# Patient Record
Sex: MALE | Race: WHITE | NOT HISPANIC OR LATINO | Employment: OTHER | URBAN - METROPOLITAN AREA
[De-identification: names, ages, dates, MRNs, and addresses within clinical notes are randomized per-mention and may not be internally consistent; named-entity substitution may affect disease eponyms.]

---

## 2017-01-01 ENCOUNTER — APPOINTMENT (OUTPATIENT)
Dept: GENERAL RADIOLOGY | Facility: HOSPITAL | Age: 82
End: 2017-01-01

## 2017-01-01 ENCOUNTER — APPOINTMENT (OUTPATIENT)
Dept: CT IMAGING | Facility: HOSPITAL | Age: 82
End: 2017-01-01

## 2017-01-01 ENCOUNTER — APPOINTMENT (OUTPATIENT)
Dept: ULTRASOUND IMAGING | Facility: HOSPITAL | Age: 82
End: 2017-01-01

## 2017-01-01 ENCOUNTER — APPOINTMENT (OUTPATIENT)
Dept: CARDIOLOGY | Facility: HOSPITAL | Age: 82
End: 2017-01-01
Attending: FAMILY MEDICINE

## 2017-01-01 ENCOUNTER — APPOINTMENT (OUTPATIENT)
Dept: CARDIOLOGY | Facility: HOSPITAL | Age: 82
End: 2017-01-01
Attending: INTERNAL MEDICINE

## 2017-01-01 ENCOUNTER — HOSPITAL ENCOUNTER (INPATIENT)
Facility: HOSPITAL | Age: 82
LOS: 28 days | End: 2017-10-06
Attending: FAMILY MEDICINE | Admitting: INTERNAL MEDICINE

## 2017-01-01 ENCOUNTER — HOSPITAL ENCOUNTER (INPATIENT)
Facility: HOSPITAL | Age: 82
LOS: 34 days | End: 2017-09-08
Attending: EMERGENCY MEDICINE | Admitting: INTERNAL MEDICINE

## 2017-01-01 VITALS
WEIGHT: 224 LBS | HEART RATE: 70 BPM | OXYGEN SATURATION: 92 % | BODY MASS INDEX: 30.34 KG/M2 | SYSTOLIC BLOOD PRESSURE: 90 MMHG | DIASTOLIC BLOOD PRESSURE: 55 MMHG | HEIGHT: 72 IN | RESPIRATION RATE: 22 BRPM | TEMPERATURE: 97.1 F

## 2017-01-01 VITALS
HEART RATE: 60 BPM | SYSTOLIC BLOOD PRESSURE: 130 MMHG | OXYGEN SATURATION: 96 % | DIASTOLIC BLOOD PRESSURE: 81 MMHG | BODY MASS INDEX: 30.46 KG/M2 | WEIGHT: 224.87 LBS | HEIGHT: 72 IN | TEMPERATURE: 97.7 F | RESPIRATION RATE: 18 BRPM

## 2017-01-01 DIAGNOSIS — Z74.09 IMPAIRED FUNCTIONAL MOBILITY, BALANCE, GAIT, AND ENDURANCE: Primary | ICD-10-CM

## 2017-01-01 DIAGNOSIS — I10 UNCONTROLLED HYPERTENSION: ICD-10-CM

## 2017-01-01 DIAGNOSIS — I44.2 COMPLETE HEART BLOCK (HCC): ICD-10-CM

## 2017-01-01 DIAGNOSIS — Z74.09 IMPAIRED MOBILITY AND ADLS: ICD-10-CM

## 2017-01-01 DIAGNOSIS — Z78.9 IMPAIRED MOBILITY AND ADLS: ICD-10-CM

## 2017-01-01 DIAGNOSIS — R13.13 PHARYNGEAL DYSPHAGIA: Primary | ICD-10-CM

## 2017-01-01 DIAGNOSIS — R13.10 DYSPHAGIA, UNSPECIFIED: ICD-10-CM

## 2017-01-01 DIAGNOSIS — Z74.09 IMPAIRED FUNCTIONAL MOBILITY, BALANCE, GAIT, AND ENDURANCE: ICD-10-CM

## 2017-01-01 LAB
ABO + RH BLD: NORMAL
ABO + RH BLD: NORMAL
ABO GROUP BLD: NORMAL
ABO GROUP BLD: NORMAL
ALBUMIN SERPL-MCNC: 2.6 G/DL (ref 2.9–4.4)
ALBUMIN SERPL-MCNC: 2.8 G/DL (ref 3.2–4.8)
ALBUMIN SERPL-MCNC: 2.9 G/DL (ref 3.2–4.8)
ALBUMIN SERPL-MCNC: 3 G/DL (ref 3.2–4.8)
ALBUMIN SERPL-MCNC: 3 G/DL (ref 3.2–4.8)
ALBUMIN SERPL-MCNC: 3.1 G/DL (ref 3.2–4.8)
ALBUMIN SERPL-MCNC: 3.2 G/DL (ref 3.2–4.8)
ALBUMIN SERPL-MCNC: 3.2 G/DL (ref 3.2–4.8)
ALBUMIN SERPL-MCNC: 3.3 G/DL (ref 3.2–4.8)
ALBUMIN SERPL-MCNC: 3.3 G/DL (ref 3.2–4.8)
ALBUMIN SERPL-MCNC: 3.4 G/DL (ref 3.2–4.8)
ALBUMIN SERPL-MCNC: 3.4 G/DL (ref 3.2–4.8)
ALBUMIN SERPL-MCNC: 3.5 G/DL (ref 3.2–4.8)
ALBUMIN SERPL-MCNC: 3.6 G/DL (ref 3.2–4.8)
ALBUMIN SERPL-MCNC: 3.8 G/DL (ref 3.2–4.8)
ALBUMIN SERPL-MCNC: 3.9 G/DL (ref 3.2–4.8)
ALBUMIN/GLOB SERPL: 0.9 G/DL (ref 1.5–2.5)
ALBUMIN/GLOB SERPL: 1 G/DL (ref 1.5–2.5)
ALBUMIN/GLOB SERPL: 1.1 G/DL (ref 1.5–2.5)
ALBUMIN/GLOB SERPL: 1.1 {RATIO} (ref 0.7–1.7)
ALBUMIN/GLOB SERPL: 1.5 G/DL (ref 1.5–2.5)
ALBUMIN/GLOB SERPL: 1.6 G/DL (ref 1.5–2.5)
ALBUMIN/GLOB SERPL: 1.6 G/DL (ref 1.5–2.5)
ALBUMIN/GLOB SERPL: 1.8 G/DL (ref 1.5–2.5)
ALP SERPL-CCNC: 106 U/L (ref 25–100)
ALP SERPL-CCNC: 108 U/L (ref 25–100)
ALP SERPL-CCNC: 52 U/L (ref 25–100)
ALP SERPL-CCNC: 54 U/L (ref 25–100)
ALP SERPL-CCNC: 55 U/L (ref 25–100)
ALP SERPL-CCNC: 59 U/L (ref 25–100)
ALP SERPL-CCNC: 62 U/L (ref 25–100)
ALP SERPL-CCNC: 62 U/L (ref 25–100)
ALP SERPL-CCNC: 67 U/L (ref 25–100)
ALP SERPL-CCNC: 70 U/L (ref 25–100)
ALP SERPL-CCNC: 77 U/L (ref 25–100)
ALP SERPL-CCNC: 77 U/L (ref 25–100)
ALP SERPL-CCNC: 80 U/L (ref 25–100)
ALP SERPL-CCNC: 87 U/L (ref 25–100)
ALP SERPL-CCNC: 91 U/L (ref 25–100)
ALPHA1 GLOB FLD ELPH-MCNC: 0.3 G/DL (ref 0–0.4)
ALPHA2 GLOB SERPL ELPH-MCNC: 0.6 G/DL (ref 0.4–1)
ALT SERPL W P-5'-P-CCNC: 118 U/L (ref 7–40)
ALT SERPL W P-5'-P-CCNC: 122 U/L (ref 7–40)
ALT SERPL W P-5'-P-CCNC: 15 U/L (ref 7–40)
ALT SERPL W P-5'-P-CCNC: 152 U/L (ref 7–40)
ALT SERPL W P-5'-P-CCNC: 18 U/L (ref 7–40)
ALT SERPL W P-5'-P-CCNC: 19 U/L (ref 7–40)
ALT SERPL W P-5'-P-CCNC: 193 U/L (ref 7–40)
ALT SERPL W P-5'-P-CCNC: 20 U/L (ref 7–40)
ALT SERPL W P-5'-P-CCNC: 23 U/L (ref 7–40)
ALT SERPL W P-5'-P-CCNC: 24 U/L (ref 7–40)
ALT SERPL W P-5'-P-CCNC: 25 U/L (ref 7–40)
ALT SERPL W P-5'-P-CCNC: 30 U/L (ref 7–40)
ALT SERPL W P-5'-P-CCNC: 33 U/L (ref 7–40)
ALT SERPL W P-5'-P-CCNC: 34 U/L (ref 7–40)
ALT SERPL W P-5'-P-CCNC: 51 U/L (ref 7–40)
AMMONIA BLD-SCNC: 25 UMOL/L (ref 19–60)
AMMONIA BLD-SCNC: 29 UMOL/L (ref 19–60)
AMPHET+METHAMPHET UR QL: NEGATIVE
AMPHET+METHAMPHET UR QL: NEGATIVE
AMPHETAMINES UR QL: NEGATIVE
AMPHETAMINES UR QL: NEGATIVE
ANA SER QL IA: NEGATIVE
ANION GAP SERPL CALCULATED.3IONS-SCNC: 1 MMOL/L (ref 3–11)
ANION GAP SERPL CALCULATED.3IONS-SCNC: 10 MMOL/L (ref 3–11)
ANION GAP SERPL CALCULATED.3IONS-SCNC: 11 MMOL/L (ref 3–11)
ANION GAP SERPL CALCULATED.3IONS-SCNC: 12 MMOL/L (ref 3–11)
ANION GAP SERPL CALCULATED.3IONS-SCNC: 12 MMOL/L (ref 3–11)
ANION GAP SERPL CALCULATED.3IONS-SCNC: 13 MMOL/L (ref 3–11)
ANION GAP SERPL CALCULATED.3IONS-SCNC: 14 MMOL/L (ref 3–11)
ANION GAP SERPL CALCULATED.3IONS-SCNC: 4 MMOL/L (ref 3–11)
ANION GAP SERPL CALCULATED.3IONS-SCNC: 5 MMOL/L (ref 3–11)
ANION GAP SERPL CALCULATED.3IONS-SCNC: 6 MMOL/L (ref 3–11)
ANION GAP SERPL CALCULATED.3IONS-SCNC: 7 MMOL/L (ref 3–11)
ANION GAP SERPL CALCULATED.3IONS-SCNC: 7 MMOL/L (ref 3–11)
ANION GAP SERPL CALCULATED.3IONS-SCNC: 8 MMOL/L (ref 3–11)
ANION GAP SERPL CALCULATED.3IONS-SCNC: 9 MMOL/L (ref 3–11)
APTT PPP: 28.8 SECONDS (ref 24–31)
ARTERIAL PATENCY WRIST A: ABNORMAL
ARTERIAL PATENCY WRIST A: POSITIVE
AST SERPL-CCNC: 109 U/L (ref 0–33)
AST SERPL-CCNC: 149 U/L (ref 0–33)
AST SERPL-CCNC: 23 U/L (ref 0–33)
AST SERPL-CCNC: 23 U/L (ref 0–33)
AST SERPL-CCNC: 28 U/L (ref 0–33)
AST SERPL-CCNC: 32 U/L (ref 0–33)
AST SERPL-CCNC: 32 U/L (ref 0–33)
AST SERPL-CCNC: 33 U/L (ref 0–33)
AST SERPL-CCNC: 37 U/L (ref 0–33)
AST SERPL-CCNC: 38 U/L (ref 0–33)
AST SERPL-CCNC: 41 U/L (ref 0–33)
AST SERPL-CCNC: 42 U/L (ref 0–33)
AST SERPL-CCNC: 48 U/L (ref 0–33)
AST SERPL-CCNC: 61 U/L (ref 0–33)
AST SERPL-CCNC: 81 U/L (ref 0–33)
ATMOSPHERIC PRESS: ABNORMAL MMHG
B-GLOBULIN SERPL ELPH-MCNC: 0.7 G/DL (ref 0.7–1.3)
BACTERIA SPEC AEROBE CULT: NORMAL
BACTERIA UR QL AUTO: ABNORMAL /HPF
BARBITURATES UR QL SCN: NEGATIVE
BARBITURATES UR QL SCN: NEGATIVE
BASE EXCESS BLDA CALC-SCNC: -1.9 MMOL/L (ref 0–2)
BASE EXCESS BLDA CALC-SCNC: -6.5 MMOL/L (ref 0–2)
BASE EXCESS BLDA CALC-SCNC: -7.8 MMOL/L (ref 0–2)
BASE EXCESS BLDA CALC-SCNC: 3 MMOL/L (ref 0–2)
BASE EXCESS BLDA CALC-SCNC: 3.5 MMOL/L (ref 0–2)
BASOPHILS # BLD AUTO: 0.01 10*3/MM3 (ref 0–0.2)
BASOPHILS # BLD AUTO: 0.02 10*3/MM3 (ref 0–0.2)
BASOPHILS # BLD AUTO: 0.03 10*3/MM3 (ref 0–0.2)
BASOPHILS # BLD AUTO: 0.05 10*3/MM3 (ref 0–0.2)
BASOPHILS NFR BLD AUTO: 0.1 % (ref 0–1)
BASOPHILS NFR BLD AUTO: 0.2 % (ref 0–1)
BASOPHILS NFR BLD AUTO: 0.2 % (ref 0–1)
BASOPHILS NFR BLD AUTO: 0.3 % (ref 0–1)
BASOPHILS NFR BLD AUTO: 0.4 % (ref 0–1)
BASOPHILS NFR BLD AUTO: 0.5 % (ref 0–1)
BASOPHILS NFR BLD AUTO: 0.5 % (ref 0–1)
BASOPHILS NFR BLD AUTO: 0.7 % (ref 0–1)
BDY SITE: ABNORMAL
BENZODIAZ UR QL SCN: NEGATIVE
BENZODIAZ UR QL SCN: POSITIVE
BH BB BLOOD EXPIRATION DATE: NORMAL
BH BB BLOOD EXPIRATION DATE: NORMAL
BH BB BLOOD TYPE BARCODE: 5100
BH BB BLOOD TYPE BARCODE: 5100
BH BB DISPENSE STATUS: NORMAL
BH BB DISPENSE STATUS: NORMAL
BH BB PRODUCT CODE: NORMAL
BH BB PRODUCT CODE: NORMAL
BH BB UNIT NUMBER: NORMAL
BH BB UNIT NUMBER: NORMAL
BH CV ECHO MEAS - AO ROOT AREA (BSA CORRECTED): 1.4
BH CV ECHO MEAS - AO ROOT AREA: 7 CM^2
BH CV ECHO MEAS - AO ROOT DIAM: 3 CM
BH CV ECHO MEAS - BSA(HAYCOCK): 2.2 M^2
BH CV ECHO MEAS - BSA(HAYCOCK): 2.3 M^2
BH CV ECHO MEAS - BSA: 2.2 M^2
BH CV ECHO MEAS - BSA: 2.2 M^2
BH CV ECHO MEAS - BZI_BMI: 29.2 KILOGRAMS/M^2
BH CV ECHO MEAS - BZI_BMI: 30.5 KILOGRAMS/M^2
BH CV ECHO MEAS - BZI_METRIC_HEIGHT: 182.9 CM
BH CV ECHO MEAS - BZI_METRIC_HEIGHT: 182.9 CM
BH CV ECHO MEAS - BZI_METRIC_WEIGHT: 102.1 KG
BH CV ECHO MEAS - BZI_METRIC_WEIGHT: 97.5 KG
BH CV ECHO MEAS - EDV(CUBED): 174.8 ML
BH CV ECHO MEAS - EDV(TEICH): 153.1 ML
BH CV ECHO MEAS - EF(CUBED): 52.4 %
BH CV ECHO MEAS - EF(TEICH): 43.8 %
BH CV ECHO MEAS - ESV(CUBED): 83.1 ML
BH CV ECHO MEAS - ESV(TEICH): 86 ML
BH CV ECHO MEAS - FS: 21.9 %
BH CV ECHO MEAS - IVS/LVPW: 1
BH CV ECHO MEAS - IVSD: 1.3 CM
BH CV ECHO MEAS - LA DIMENSION: 2.7 CM
BH CV ECHO MEAS - LA/AO: 0.88
BH CV ECHO MEAS - LV MASS(C)D: 312 GRAMS
BH CV ECHO MEAS - LV MASS(C)DI: 142 GRAMS/M^2
BH CV ECHO MEAS - LVIDD: 5.6 CM
BH CV ECHO MEAS - LVIDS: 4.4 CM
BH CV ECHO MEAS - LVPWD: 1.3 CM
BH CV ECHO MEAS - MV A MAX VEL: 124.4 CM/SEC
BH CV ECHO MEAS - MV DEC SLOPE: 194.1 CM/SEC^2
BH CV ECHO MEAS - MV DEC TIME: 0.25 SEC
BH CV ECHO MEAS - MV E MAX VEL: 64.2 CM/SEC
BH CV ECHO MEAS - MV E/A: 0.52
BH CV ECHO MEAS - MV P1/2T MAX VEL: 89 CM/SEC
BH CV ECHO MEAS - MV P1/2T: 134.4 MSEC
BH CV ECHO MEAS - MVA P1/2T LCG: 2.5 CM^2
BH CV ECHO MEAS - MVA(P1/2T): 1.6 CM^2
BH CV ECHO MEAS - PA ACC SLOPE: 567 CM/SEC^2
BH CV ECHO MEAS - PA ACC TIME: 0.17 SEC
BH CV ECHO MEAS - PA PR(ACCEL): 3 MMHG
BH CV ECHO MEAS - RV MAX PG: 4.1 MMHG
BH CV ECHO MEAS - RV V1 MAX: 100.7 CM/SEC
BH CV ECHO MEAS - SI(CUBED): 41.7 ML/M^2
BH CV ECHO MEAS - SI(TEICH): 30.5 ML/M^2
BH CV ECHO MEAS - SV(CUBED): 91.7 ML
BH CV ECHO MEAS - SV(TEICH): 67.1 ML
BH CV ECHO MEAS - TAPSE (>1.6): 3.2 CM2
BH CV UPPER VENOUS RIGHT AXILLARY AUGMENT: NORMAL
BH CV UPPER VENOUS RIGHT AXILLARY COMPRESS: NORMAL
BH CV UPPER VENOUS RIGHT AXILLARY PHASIC: NORMAL
BH CV UPPER VENOUS RIGHT AXILLARY SPONT: NORMAL
BH CV UPPER VENOUS RIGHT BASILIC UPPER COMPRESS: NORMAL
BH CV UPPER VENOUS RIGHT BRACHIAL COMPRESS: NORMAL
BH CV UPPER VENOUS RIGHT CEPHALIC FOREARM COLOR: 1
BH CV UPPER VENOUS RIGHT CEPHALIC FOREARM COMPRESS: NORMAL
BH CV UPPER VENOUS RIGHT CEPHALIC FOREARM THROMBUS: NORMAL
BH CV UPPER VENOUS RIGHT CEPHALIC UPPER COLOR: 1
BH CV UPPER VENOUS RIGHT CEPHALIC UPPER COMPRESS: NORMAL
BH CV UPPER VENOUS RIGHT CEPHALIC UPPER THROMBUS: NORMAL
BH CV UPPER VENOUS RIGHT INTERNAL JUGULAR AUGMENT: NORMAL
BH CV UPPER VENOUS RIGHT INTERNAL JUGULAR COMPRESS: NORMAL
BH CV UPPER VENOUS RIGHT INTERNAL JUGULAR PHASIC: NORMAL
BH CV UPPER VENOUS RIGHT INTERNAL JUGULAR SPONT: NORMAL
BH CV UPPER VENOUS RIGHT RADIAL COMPRESS: NORMAL
BH CV UPPER VENOUS RIGHT SUBCLAVIAN AUGMENT: NORMAL
BH CV UPPER VENOUS RIGHT SUBCLAVIAN COMPRESS: NORMAL
BH CV UPPER VENOUS RIGHT SUBCLAVIAN PHASIC: NORMAL
BH CV UPPER VENOUS RIGHT SUBCLAVIAN SPONT: NORMAL
BH CV UPPER VENOUS RIGHT ULNAR COMPRESS: NORMAL
BH CV XLRA - RV BASE: 4.4 CM
BH CV XLRA - RV LENGTH: 7.4 CM
BH CV XLRA - RV MID: 4.7 CM
BH CV XLRA - TDI S': 7.25 CM/SEC
BILIRUB SERPL-MCNC: 0.2 MG/DL (ref 0.3–1.2)
BILIRUB SERPL-MCNC: 0.3 MG/DL (ref 0.3–1.2)
BILIRUB SERPL-MCNC: 0.3 MG/DL (ref 0.3–1.2)
BILIRUB SERPL-MCNC: 0.4 MG/DL (ref 0.3–1.2)
BILIRUB SERPL-MCNC: 0.4 MG/DL (ref 0.3–1.2)
BILIRUB UR QL STRIP: NEGATIVE
BLD GP AB SCN SERPL QL: POSITIVE
BUN BLD-MCNC: 102 MG/DL (ref 9–23)
BUN BLD-MCNC: 103 MG/DL (ref 9–23)
BUN BLD-MCNC: 114 MG/DL (ref 9–23)
BUN BLD-MCNC: 116 MG/DL (ref 9–23)
BUN BLD-MCNC: 119 MG/DL (ref 9–23)
BUN BLD-MCNC: 131 MG/DL (ref 9–23)
BUN BLD-MCNC: 137 MG/DL (ref 9–23)
BUN BLD-MCNC: 140 MG/DL (ref 9–23)
BUN BLD-MCNC: 38 MG/DL (ref 9–23)
BUN BLD-MCNC: 43 MG/DL (ref 9–23)
BUN BLD-MCNC: 44 MG/DL (ref 9–23)
BUN BLD-MCNC: 45 MG/DL (ref 9–23)
BUN BLD-MCNC: 46 MG/DL (ref 9–23)
BUN BLD-MCNC: 50 MG/DL (ref 9–23)
BUN BLD-MCNC: 52 MG/DL (ref 9–23)
BUN BLD-MCNC: 60 MG/DL (ref 9–23)
BUN BLD-MCNC: 68 MG/DL (ref 9–23)
BUN BLD-MCNC: 69 MG/DL (ref 9–23)
BUN BLD-MCNC: 75 MG/DL (ref 9–23)
BUN BLD-MCNC: 79 MG/DL (ref 9–23)
BUN BLD-MCNC: 80 MG/DL (ref 9–23)
BUN BLD-MCNC: 80 MG/DL (ref 9–23)
BUN BLD-MCNC: 81 MG/DL (ref 9–23)
BUN BLD-MCNC: 83 MG/DL (ref 9–23)
BUN BLD-MCNC: 83 MG/DL (ref 9–23)
BUN BLD-MCNC: 84 MG/DL (ref 9–23)
BUN BLD-MCNC: 84 MG/DL (ref 9–23)
BUN BLD-MCNC: 86 MG/DL (ref 9–23)
BUN BLD-MCNC: 87 MG/DL (ref 9–23)
BUN BLD-MCNC: 88 MG/DL (ref 9–23)
BUN BLD-MCNC: 88 MG/DL (ref 9–23)
BUN BLD-MCNC: 94 MG/DL (ref 9–23)
BUN BLD-MCNC: 97 MG/DL (ref 9–23)
BUN BLDA-MCNC: 46 MG/DL (ref 8–26)
BUN/CREAT SERPL: 11.9 (ref 7–25)
BUN/CREAT SERPL: 12.6 (ref 7–25)
BUN/CREAT SERPL: 13.7 (ref 7–25)
BUN/CREAT SERPL: 14.1 (ref 7–25)
BUN/CREAT SERPL: 14.4 (ref 7–25)
BUN/CREAT SERPL: 14.7 (ref 7–25)
BUN/CREAT SERPL: 15.8 (ref 7–25)
BUN/CREAT SERPL: 16.7 (ref 7–25)
BUN/CREAT SERPL: 17.4 (ref 7–25)
BUN/CREAT SERPL: 19.7 (ref 7–25)
BUN/CREAT SERPL: 20.8 (ref 7–25)
BUN/CREAT SERPL: 23.5 (ref 7–25)
BUN/CREAT SERPL: 27.8 (ref 7–25)
BUN/CREAT SERPL: 28.1 (ref 7–25)
BUN/CREAT SERPL: 33.2 (ref 7–25)
BUN/CREAT SERPL: 34.8 (ref 7–25)
BUN/CREAT SERPL: 36.8 (ref 7–25)
BUN/CREAT SERPL: 39.1 (ref 7–25)
BUN/CREAT SERPL: 39.2 (ref 7–25)
BUN/CREAT SERPL: 40 (ref 7–25)
BUN/CREAT SERPL: 40.7 (ref 7–25)
BUN/CREAT SERPL: 41.9 (ref 7–25)
BUN/CREAT SERPL: 42.9 (ref 7–25)
BUN/CREAT SERPL: 44 (ref 7–25)
BUN/CREAT SERPL: 44.8 (ref 7–25)
BUN/CREAT SERPL: 45.8 (ref 7–25)
BUN/CREAT SERPL: 48.5 (ref 7–25)
BUN/CREAT SERPL: 48.9 (ref 7–25)
BUPRENORPHINE SERPL-MCNC: NEGATIVE NG/ML
BUPRENORPHINE SERPL-MCNC: NEGATIVE NG/ML
C-ANCA TITR SER IF: NORMAL TITER
CA-I BLDA-SCNC: 1.14 MMOL/L (ref 1.2–1.32)
CA-I SERPL ISE-MCNC: 1.09 MMOL/L (ref 1.12–1.32)
CA-I SERPL ISE-MCNC: 1.13 MMOL/L (ref 1.12–1.32)
CA-I SERPL ISE-MCNC: 1.19 MMOL/L (ref 1.12–1.32)
CALCIUM SPEC-SCNC: 7.4 MG/DL (ref 8.7–10.4)
CALCIUM SPEC-SCNC: 7.5 MG/DL (ref 8.7–10.4)
CALCIUM SPEC-SCNC: 7.9 MG/DL (ref 8.7–10.4)
CALCIUM SPEC-SCNC: 7.9 MG/DL (ref 8.7–10.4)
CALCIUM SPEC-SCNC: 8.1 MG/DL (ref 8.7–10.4)
CALCIUM SPEC-SCNC: 8.1 MG/DL (ref 8.7–10.4)
CALCIUM SPEC-SCNC: 8.3 MG/DL (ref 8.7–10.4)
CALCIUM SPEC-SCNC: 8.4 MG/DL (ref 8.7–10.4)
CALCIUM SPEC-SCNC: 8.5 MG/DL (ref 8.7–10.4)
CALCIUM SPEC-SCNC: 8.6 MG/DL (ref 8.7–10.4)
CALCIUM SPEC-SCNC: 8.7 MG/DL (ref 8.7–10.4)
CALCIUM SPEC-SCNC: 8.7 MG/DL (ref 8.7–10.4)
CALCIUM SPEC-SCNC: 8.8 MG/DL (ref 8.7–10.4)
CALCIUM SPEC-SCNC: 8.8 MG/DL (ref 8.7–10.4)
CALCIUM SPEC-SCNC: 8.9 MG/DL (ref 8.7–10.4)
CALCIUM SPEC-SCNC: 9 MG/DL (ref 8.7–10.4)
CALCIUM SPEC-SCNC: 9.1 MG/DL (ref 8.7–10.4)
CALCIUM SPEC-SCNC: 9.2 MG/DL (ref 8.7–10.4)
CALCIUM SPEC-SCNC: 9.3 MG/DL (ref 8.7–10.4)
CALCIUM SPEC-SCNC: 9.4 MG/DL (ref 8.7–10.4)
CALCIUM SPEC-SCNC: 9.5 MG/DL (ref 8.7–10.4)
CALCIUM SPEC-SCNC: 9.8 MG/DL (ref 8.7–10.4)
CALCIUM SPEC-SCNC: 9.9 MG/DL (ref 8.7–10.4)
CANNABINOIDS SERPL QL: NEGATIVE
CANNABINOIDS SERPL QL: NEGATIVE
CHLORIDE BLDA-SCNC: 107 MMOL/L (ref 98–109)
CHLORIDE SERPL-SCNC: 103 MMOL/L (ref 99–109)
CHLORIDE SERPL-SCNC: 105 MMOL/L (ref 99–109)
CHLORIDE SERPL-SCNC: 107 MMOL/L (ref 99–109)
CHLORIDE SERPL-SCNC: 108 MMOL/L (ref 99–109)
CHLORIDE SERPL-SCNC: 109 MMOL/L (ref 99–109)
CHLORIDE SERPL-SCNC: 110 MMOL/L (ref 99–109)
CHLORIDE SERPL-SCNC: 110 MMOL/L (ref 99–109)
CHLORIDE SERPL-SCNC: 111 MMOL/L (ref 99–109)
CHLORIDE SERPL-SCNC: 111 MMOL/L (ref 99–109)
CHLORIDE SERPL-SCNC: 112 MMOL/L (ref 99–109)
CHLORIDE SERPL-SCNC: 112 MMOL/L (ref 99–109)
CHLORIDE SERPL-SCNC: 113 MMOL/L (ref 99–109)
CHLORIDE SERPL-SCNC: 114 MMOL/L (ref 99–109)
CHLORIDE SERPL-SCNC: 114 MMOL/L (ref 99–109)
CHLORIDE SERPL-SCNC: 115 MMOL/L (ref 99–109)
CHLORIDE SERPL-SCNC: 115 MMOL/L (ref 99–109)
CHLORIDE SERPL-SCNC: 116 MMOL/L (ref 99–109)
CHLORIDE SERPL-SCNC: 116 MMOL/L (ref 99–109)
CHLORIDE SERPL-SCNC: 117 MMOL/L (ref 99–109)
CHLORIDE SERPL-SCNC: 118 MMOL/L (ref 99–109)
CHLORIDE SERPL-SCNC: 118 MMOL/L (ref 99–109)
CHLORIDE SERPL-SCNC: 122 MMOL/L (ref 99–109)
CHOLEST SERPL-MCNC: 103 MG/DL (ref 0–200)
CHOLEST SERPL-MCNC: 109 MG/DL (ref 0–200)
CHOLEST SERPL-MCNC: 95 MG/DL (ref 0–200)
CHOLEST SERPL-MCNC: 97 MG/DL (ref 0–200)
CHOLEST SERPL-MCNC: 98 MG/DL (ref 0–200)
CLARITY UR: ABNORMAL
CLARITY UR: ABNORMAL
CLARITY UR: CLEAR
CO2 BLDA-SCNC: 18.7 MMOL/L (ref 22–33)
CO2 BLDA-SCNC: 19.4 MMOL/L (ref 22–33)
CO2 BLDA-SCNC: 22 MMOL/L (ref 24–29)
CO2 BLDA-SCNC: 24 MMOL/L (ref 22–33)
CO2 BLDA-SCNC: 28.6 MMOL/L (ref 22–33)
CO2 BLDA-SCNC: 28.7 MMOL/L (ref 22–33)
CO2 SERPL-SCNC: 16 MMOL/L (ref 20–31)
CO2 SERPL-SCNC: 17 MMOL/L (ref 20–31)
CO2 SERPL-SCNC: 18 MMOL/L (ref 20–31)
CO2 SERPL-SCNC: 19 MMOL/L (ref 20–31)
CO2 SERPL-SCNC: 20 MMOL/L (ref 20–31)
CO2 SERPL-SCNC: 20 MMOL/L (ref 20–31)
CO2 SERPL-SCNC: 22 MMOL/L (ref 20–31)
CO2 SERPL-SCNC: 23 MMOL/L (ref 20–31)
CO2 SERPL-SCNC: 24 MMOL/L (ref 20–31)
CO2 SERPL-SCNC: 25 MMOL/L (ref 20–31)
CO2 SERPL-SCNC: 26 MMOL/L (ref 20–31)
CO2 SERPL-SCNC: 26 MMOL/L (ref 20–31)
CO2 SERPL-SCNC: 27 MMOL/L (ref 20–31)
CO2 SERPL-SCNC: 29 MMOL/L (ref 20–31)
CO2 SERPL-SCNC: 30 MMOL/L (ref 20–31)
CO2 SERPL-SCNC: 30 MMOL/L (ref 20–31)
COCAINE UR QL: NEGATIVE
COCAINE UR QL: NEGATIVE
COHGB MFR BLD: 0.8 % (ref 0–2)
COHGB MFR BLD: 1 % (ref 0–2)
COHGB MFR BLD: 1 % (ref 0–2)
COHGB MFR BLD: 1.1 % (ref 0–2)
COHGB MFR BLD: 1.1 % (ref 0–2)
COLD SCREEN: POSITIVE
COLOR UR: YELLOW
CREAT BLD-MCNC: 2 MG/DL (ref 0.6–1.3)
CREAT BLD-MCNC: 2 MG/DL (ref 0.6–1.3)
CREAT BLD-MCNC: 2.1 MG/DL (ref 0.6–1.3)
CREAT BLD-MCNC: 2.2 MG/DL (ref 0.6–1.3)
CREAT BLD-MCNC: 2.2 MG/DL (ref 0.6–1.3)
CREAT BLD-MCNC: 2.3 MG/DL (ref 0.6–1.3)
CREAT BLD-MCNC: 2.3 MG/DL (ref 0.6–1.3)
CREAT BLD-MCNC: 2.4 MG/DL (ref 0.6–1.3)
CREAT BLD-MCNC: 2.5 MG/DL (ref 0.6–1.3)
CREAT BLD-MCNC: 2.6 MG/DL (ref 0.6–1.3)
CREAT BLD-MCNC: 2.6 MG/DL (ref 0.6–1.3)
CREAT BLD-MCNC: 2.7 MG/DL (ref 0.6–1.3)
CREAT BLD-MCNC: 2.8 MG/DL (ref 0.6–1.3)
CREAT BLD-MCNC: 3 MG/DL (ref 0.6–1.3)
CREAT BLD-MCNC: 3 MG/DL (ref 0.6–1.3)
CREAT BLD-MCNC: 3.1 MG/DL (ref 0.6–1.3)
CREAT BLD-MCNC: 3.2 MG/DL (ref 0.6–1.3)
CREAT BLD-MCNC: 3.4 MG/DL (ref 0.6–1.3)
CREAT BLD-MCNC: 3.4 MG/DL (ref 0.6–1.3)
CREAT BLD-MCNC: 3.5 MG/DL (ref 0.6–1.3)
CREAT BLD-MCNC: 3.8 MG/DL (ref 0.6–1.3)
CREAT BLD-MCNC: 3.9 MG/DL (ref 0.6–1.3)
CREAT BLDA-MCNC: 3.4 MG/DL (ref 0.6–1.3)
CREAT UR-MCNC: 101.2 MG/DL
CREAT UR-MCNC: 261 MG/DL
CROSSMATCH INTERPRETATION: NORMAL
CROSSMATCH INTERPRETATION: NORMAL
CRP SERPL-MCNC: 2.13 MG/DL (ref 0–1)
CRP SERPL-MCNC: 2.61 MG/DL (ref 0–1)
CRP SERPL-MCNC: 7.38 MG/DL (ref 0–1)
D-LACTATE SERPL-SCNC: 0.6 MMOL/L (ref 0.5–2)
DEPRECATED RDW RBC AUTO: 47.6 FL (ref 37–54)
DEPRECATED RDW RBC AUTO: 49.3 FL (ref 37–54)
DEPRECATED RDW RBC AUTO: 49.4 FL (ref 37–54)
DEPRECATED RDW RBC AUTO: 49.6 FL (ref 37–54)
DEPRECATED RDW RBC AUTO: 49.8 FL (ref 37–54)
DEPRECATED RDW RBC AUTO: 50.5 FL (ref 37–54)
DEPRECATED RDW RBC AUTO: 51 FL (ref 37–54)
DEPRECATED RDW RBC AUTO: 51.1 FL (ref 37–54)
DEPRECATED RDW RBC AUTO: 51.8 FL (ref 37–54)
DEPRECATED RDW RBC AUTO: 51.8 FL (ref 37–54)
DEPRECATED RDW RBC AUTO: 52.1 FL (ref 37–54)
DEPRECATED RDW RBC AUTO: 52.3 FL (ref 37–54)
DEPRECATED RDW RBC AUTO: 52.8 FL (ref 37–54)
DEPRECATED RDW RBC AUTO: 53.3 FL (ref 37–54)
DEPRECATED RDW RBC AUTO: 53.3 FL (ref 37–54)
DEPRECATED RDW RBC AUTO: 53.7 FL (ref 37–54)
DEPRECATED RDW RBC AUTO: 53.9 FL (ref 37–54)
DEPRECATED RDW RBC AUTO: 55.1 FL (ref 37–54)
DEPRECATED RDW RBC AUTO: 55.9 FL (ref 37–54)
EOSINOPHIL # BLD AUTO: 0.12 10*3/MM3 (ref 0–0.3)
EOSINOPHIL # BLD AUTO: 0.13 10*3/MM3 (ref 0–0.3)
EOSINOPHIL # BLD AUTO: 0.17 10*3/MM3 (ref 0–0.3)
EOSINOPHIL # BLD AUTO: 0.17 10*3/MM3 (ref 0–0.3)
EOSINOPHIL # BLD AUTO: 0.2 10*3/MM3 (ref 0–0.3)
EOSINOPHIL # BLD AUTO: 0.22 10*3/MM3 (ref 0–0.3)
EOSINOPHIL # BLD AUTO: 0.4 10*3/MM3 (ref 0–0.3)
EOSINOPHIL NFR BLD AUTO: 2.1 % (ref 0–3)
EOSINOPHIL NFR BLD AUTO: 2.4 % (ref 0–3)
EOSINOPHIL NFR BLD AUTO: 2.8 % (ref 0–3)
EOSINOPHIL NFR BLD AUTO: 3.1 % (ref 0–3)
EOSINOPHIL NFR BLD AUTO: 3.1 % (ref 0–3)
EOSINOPHIL NFR BLD AUTO: 3.2 % (ref 0–3)
EOSINOPHIL NFR BLD AUTO: 3.2 % (ref 0–3)
EOSINOPHIL NFR BLD AUTO: 3.7 % (ref 0–3)
EOSINOPHIL NFR BLD AUTO: 4 % (ref 0–3)
EOSINOPHIL NFR BLD AUTO: 5.4 % (ref 0–3)
EOSINOPHIL SPEC QL MICRO: 0 % EOS/100 CELLS (ref 0–0)
EOSINOPHIL SPEC QL MICRO: 0 % EOS/100 CELLS (ref 0–0)
ERYTHROCYTE [DISTWIDTH] IN BLOOD BY AUTOMATED COUNT: 14.8 % (ref 11.3–14.5)
ERYTHROCYTE [DISTWIDTH] IN BLOOD BY AUTOMATED COUNT: 15.2 % (ref 11.3–14.5)
ERYTHROCYTE [DISTWIDTH] IN BLOOD BY AUTOMATED COUNT: 15.3 % (ref 11.3–14.5)
ERYTHROCYTE [DISTWIDTH] IN BLOOD BY AUTOMATED COUNT: 15.3 % (ref 11.3–14.5)
ERYTHROCYTE [DISTWIDTH] IN BLOOD BY AUTOMATED COUNT: 15.5 % (ref 11.3–14.5)
ERYTHROCYTE [DISTWIDTH] IN BLOOD BY AUTOMATED COUNT: 15.6 % (ref 11.3–14.5)
ERYTHROCYTE [DISTWIDTH] IN BLOOD BY AUTOMATED COUNT: 15.7 % (ref 11.3–14.5)
ERYTHROCYTE [DISTWIDTH] IN BLOOD BY AUTOMATED COUNT: 15.8 % (ref 11.3–14.5)
ERYTHROCYTE [DISTWIDTH] IN BLOOD BY AUTOMATED COUNT: 15.9 % (ref 11.3–14.5)
ERYTHROCYTE [DISTWIDTH] IN BLOOD BY AUTOMATED COUNT: 15.9 % (ref 11.3–14.5)
ERYTHROCYTE [DISTWIDTH] IN BLOOD BY AUTOMATED COUNT: 16 % (ref 11.3–14.5)
ERYTHROCYTE [DISTWIDTH] IN BLOOD BY AUTOMATED COUNT: 16 % (ref 11.3–14.5)
ERYTHROCYTE [DISTWIDTH] IN BLOOD BY AUTOMATED COUNT: 16.2 % (ref 11.3–14.5)
ERYTHROCYTE [DISTWIDTH] IN BLOOD BY AUTOMATED COUNT: 16.3 % (ref 11.3–14.5)
ERYTHROCYTE [DISTWIDTH] IN BLOOD BY AUTOMATED COUNT: 16.3 % (ref 11.3–14.5)
ERYTHROCYTE [DISTWIDTH] IN BLOOD BY AUTOMATED COUNT: 16.4 % (ref 11.3–14.5)
ERYTHROCYTE [DISTWIDTH] IN BLOOD BY AUTOMATED COUNT: 16.4 % (ref 11.3–14.5)
ERYTHROCYTE [DISTWIDTH] IN BLOOD BY AUTOMATED COUNT: 16.8 % (ref 11.3–14.5)
ERYTHROCYTE [DISTWIDTH] IN BLOOD BY AUTOMATED COUNT: 16.9 % (ref 11.3–14.5)
FERRITIN SERPL-MCNC: 144 NG/ML (ref 22–322)
FOLATE SERPL-MCNC: 19.27 NG/ML (ref 3.2–20)
FOLATE SERPL-MCNC: >24 NG/ML (ref 3.2–20)
GAMMA GLOB SERPL ELPH-MCNC: 0.7 G/DL (ref 0.4–1.8)
GAS FLOW AIRWAY: 5 LPM
GBM IGG SER-ACNC: 4 UNITS (ref 0–20)
GFR SERPL CREATININE-BSD FRML MDRD: 15 ML/MIN/1.73
GFR SERPL CREATININE-BSD FRML MDRD: 17 ML/MIN/1.73
GFR SERPL CREATININE-BSD FRML MDRD: 18 ML/MIN/1.73
GFR SERPL CREATININE-BSD FRML MDRD: 18 ML/MIN/1.73
GFR SERPL CREATININE-BSD FRML MDRD: 19 ML/MIN/1.73
GFR SERPL CREATININE-BSD FRML MDRD: 20 ML/MIN/1.73
GFR SERPL CREATININE-BSD FRML MDRD: 20 ML/MIN/1.73
GFR SERPL CREATININE-BSD FRML MDRD: 22 ML/MIN/1.73
GFR SERPL CREATININE-BSD FRML MDRD: 22 ML/MIN/1.73
GFR SERPL CREATININE-BSD FRML MDRD: 23 ML/MIN/1.73
GFR SERPL CREATININE-BSD FRML MDRD: 23 ML/MIN/1.73
GFR SERPL CREATININE-BSD FRML MDRD: 24 ML/MIN/1.73
GFR SERPL CREATININE-BSD FRML MDRD: 24 ML/MIN/1.73
GFR SERPL CREATININE-BSD FRML MDRD: 25 ML/MIN/1.73
GFR SERPL CREATININE-BSD FRML MDRD: 26 ML/MIN/1.73
GFR SERPL CREATININE-BSD FRML MDRD: 27 ML/MIN/1.73
GFR SERPL CREATININE-BSD FRML MDRD: 29 ML/MIN/1.73
GFR SERPL CREATININE-BSD FRML MDRD: 29 ML/MIN/1.73
GFR SERPL CREATININE-BSD FRML MDRD: 31 ML/MIN/1.73
GFR SERPL CREATININE-BSD FRML MDRD: 32 ML/MIN/1.73
GLOBULIN SER CALC-MCNC: 2.4 G/DL (ref 2.2–3.9)
GLOBULIN UR ELPH-MCNC: 1.7 GM/DL
GLOBULIN UR ELPH-MCNC: 2 GM/DL
GLOBULIN UR ELPH-MCNC: 2.5 GM/DL
GLOBULIN UR ELPH-MCNC: 2.5 GM/DL
GLOBULIN UR ELPH-MCNC: 2.8 GM/DL
GLOBULIN UR ELPH-MCNC: 2.9 GM/DL
GLOBULIN UR ELPH-MCNC: 3 GM/DL
GLOBULIN UR ELPH-MCNC: 3.2 GM/DL
GLUCOSE BLD-MCNC: 101 MG/DL (ref 70–100)
GLUCOSE BLD-MCNC: 103 MG/DL (ref 70–100)
GLUCOSE BLD-MCNC: 104 MG/DL (ref 70–100)
GLUCOSE BLD-MCNC: 104 MG/DL (ref 70–100)
GLUCOSE BLD-MCNC: 105 MG/DL (ref 70–100)
GLUCOSE BLD-MCNC: 109 MG/DL (ref 70–100)
GLUCOSE BLD-MCNC: 111 MG/DL (ref 70–100)
GLUCOSE BLD-MCNC: 111 MG/DL (ref 70–100)
GLUCOSE BLD-MCNC: 114 MG/DL (ref 70–100)
GLUCOSE BLD-MCNC: 119 MG/DL (ref 70–100)
GLUCOSE BLD-MCNC: 119 MG/DL (ref 70–100)
GLUCOSE BLD-MCNC: 120 MG/DL (ref 70–100)
GLUCOSE BLD-MCNC: 122 MG/DL (ref 70–100)
GLUCOSE BLD-MCNC: 128 MG/DL (ref 70–100)
GLUCOSE BLD-MCNC: 129 MG/DL (ref 70–100)
GLUCOSE BLD-MCNC: 130 MG/DL (ref 70–100)
GLUCOSE BLD-MCNC: 134 MG/DL (ref 70–100)
GLUCOSE BLD-MCNC: 134 MG/DL (ref 70–100)
GLUCOSE BLD-MCNC: 136 MG/DL (ref 70–100)
GLUCOSE BLD-MCNC: 140 MG/DL (ref 70–100)
GLUCOSE BLD-MCNC: 67 MG/DL (ref 70–100)
GLUCOSE BLD-MCNC: 75 MG/DL (ref 70–100)
GLUCOSE BLD-MCNC: 89 MG/DL (ref 70–100)
GLUCOSE BLD-MCNC: 91 MG/DL (ref 70–100)
GLUCOSE BLD-MCNC: 92 MG/DL (ref 70–100)
GLUCOSE BLD-MCNC: 94 MG/DL (ref 70–100)
GLUCOSE BLD-MCNC: 96 MG/DL (ref 70–100)
GLUCOSE BLD-MCNC: 97 MG/DL (ref 70–100)
GLUCOSE BLD-MCNC: 99 MG/DL (ref 70–100)
GLUCOSE BLDC GLUCOMTR-MCNC: 100 MG/DL (ref 70–130)
GLUCOSE BLDC GLUCOMTR-MCNC: 102 MG/DL (ref 70–130)
GLUCOSE BLDC GLUCOMTR-MCNC: 105 MG/DL (ref 70–130)
GLUCOSE BLDC GLUCOMTR-MCNC: 107 MG/DL (ref 70–130)
GLUCOSE BLDC GLUCOMTR-MCNC: 109 MG/DL (ref 70–130)
GLUCOSE BLDC GLUCOMTR-MCNC: 110 MG/DL (ref 70–130)
GLUCOSE BLDC GLUCOMTR-MCNC: 111 MG/DL (ref 70–130)
GLUCOSE BLDC GLUCOMTR-MCNC: 112 MG/DL (ref 70–130)
GLUCOSE BLDC GLUCOMTR-MCNC: 112 MG/DL (ref 70–130)
GLUCOSE BLDC GLUCOMTR-MCNC: 113 MG/DL (ref 70–130)
GLUCOSE BLDC GLUCOMTR-MCNC: 115 MG/DL (ref 70–130)
GLUCOSE BLDC GLUCOMTR-MCNC: 115 MG/DL (ref 70–130)
GLUCOSE BLDC GLUCOMTR-MCNC: 118 MG/DL (ref 70–130)
GLUCOSE BLDC GLUCOMTR-MCNC: 118 MG/DL (ref 70–130)
GLUCOSE BLDC GLUCOMTR-MCNC: 119 MG/DL (ref 70–130)
GLUCOSE BLDC GLUCOMTR-MCNC: 120 MG/DL (ref 70–130)
GLUCOSE BLDC GLUCOMTR-MCNC: 121 MG/DL (ref 70–130)
GLUCOSE BLDC GLUCOMTR-MCNC: 121 MG/DL (ref 70–130)
GLUCOSE BLDC GLUCOMTR-MCNC: 122 MG/DL (ref 70–130)
GLUCOSE BLDC GLUCOMTR-MCNC: 125 MG/DL (ref 70–130)
GLUCOSE BLDC GLUCOMTR-MCNC: 125 MG/DL (ref 70–130)
GLUCOSE BLDC GLUCOMTR-MCNC: 127 MG/DL (ref 70–130)
GLUCOSE BLDC GLUCOMTR-MCNC: 127 MG/DL (ref 70–130)
GLUCOSE BLDC GLUCOMTR-MCNC: 128 MG/DL (ref 70–130)
GLUCOSE BLDC GLUCOMTR-MCNC: 129 MG/DL (ref 70–130)
GLUCOSE BLDC GLUCOMTR-MCNC: 129 MG/DL (ref 70–130)
GLUCOSE BLDC GLUCOMTR-MCNC: 130 MG/DL (ref 70–130)
GLUCOSE BLDC GLUCOMTR-MCNC: 134 MG/DL (ref 70–130)
GLUCOSE BLDC GLUCOMTR-MCNC: 135 MG/DL (ref 70–130)
GLUCOSE BLDC GLUCOMTR-MCNC: 135 MG/DL (ref 70–130)
GLUCOSE BLDC GLUCOMTR-MCNC: 136 MG/DL (ref 70–130)
GLUCOSE BLDC GLUCOMTR-MCNC: 137 MG/DL (ref 70–130)
GLUCOSE BLDC GLUCOMTR-MCNC: 137 MG/DL (ref 70–130)
GLUCOSE BLDC GLUCOMTR-MCNC: 138 MG/DL (ref 70–130)
GLUCOSE BLDC GLUCOMTR-MCNC: 139 MG/DL (ref 70–130)
GLUCOSE BLDC GLUCOMTR-MCNC: 140 MG/DL (ref 70–130)
GLUCOSE BLDC GLUCOMTR-MCNC: 142 MG/DL (ref 70–130)
GLUCOSE BLDC GLUCOMTR-MCNC: 144 MG/DL (ref 70–130)
GLUCOSE BLDC GLUCOMTR-MCNC: 145 MG/DL (ref 70–130)
GLUCOSE BLDC GLUCOMTR-MCNC: 147 MG/DL (ref 70–130)
GLUCOSE BLDC GLUCOMTR-MCNC: 147 MG/DL (ref 70–130)
GLUCOSE BLDC GLUCOMTR-MCNC: 149 MG/DL (ref 70–130)
GLUCOSE BLDC GLUCOMTR-MCNC: 150 MG/DL (ref 70–130)
GLUCOSE BLDC GLUCOMTR-MCNC: 151 MG/DL (ref 70–130)
GLUCOSE BLDC GLUCOMTR-MCNC: 155 MG/DL (ref 70–130)
GLUCOSE BLDC GLUCOMTR-MCNC: 157 MG/DL (ref 70–130)
GLUCOSE BLDC GLUCOMTR-MCNC: 190 MG/DL (ref 70–130)
GLUCOSE BLDC GLUCOMTR-MCNC: 81 MG/DL (ref 70–130)
GLUCOSE UR STRIP-MCNC: ABNORMAL MG/DL
GLUCOSE UR STRIP-MCNC: NEGATIVE MG/DL
GLUCOSE UR STRIP-MCNC: NEGATIVE MG/DL
HCO3 BLDA-SCNC: 17.6 MMOL/L (ref 20–26)
HCO3 BLDA-SCNC: 18.4 MMOL/L (ref 20–26)
HCO3 BLDA-SCNC: 22.8 MMOL/L (ref 20–26)
HCO3 BLDA-SCNC: 27.5 MMOL/L (ref 20–26)
HCO3 BLDA-SCNC: 27.5 MMOL/L (ref 20–26)
HCT VFR BLD AUTO: 22.5 % (ref 38.9–50.9)
HCT VFR BLD AUTO: 22.7 % (ref 38.9–50.9)
HCT VFR BLD AUTO: 22.7 % (ref 38.9–50.9)
HCT VFR BLD AUTO: 22.8 % (ref 38.9–50.9)
HCT VFR BLD AUTO: 23.6 % (ref 38.9–50.9)
HCT VFR BLD AUTO: 23.8 % (ref 38.9–50.9)
HCT VFR BLD AUTO: 23.8 % (ref 38.9–50.9)
HCT VFR BLD AUTO: 24.9 % (ref 38.9–50.9)
HCT VFR BLD AUTO: 25.2 % (ref 38.9–50.9)
HCT VFR BLD AUTO: 25.2 % (ref 38.9–50.9)
HCT VFR BLD AUTO: 25.7 % (ref 38.9–50.9)
HCT VFR BLD AUTO: 25.8 % (ref 38.9–50.9)
HCT VFR BLD AUTO: 27.2 % (ref 38.9–50.9)
HCT VFR BLD AUTO: 27.6 % (ref 38.9–50.9)
HCT VFR BLD AUTO: 28.1 % (ref 38.9–50.9)
HCT VFR BLD AUTO: 28.4 % (ref 38.9–50.9)
HCT VFR BLD AUTO: 29.3 % (ref 38.9–50.9)
HCT VFR BLD AUTO: 29.8 % (ref 38.9–50.9)
HCT VFR BLD AUTO: 29.9 % (ref 38.9–50.9)
HCT VFR BLD AUTO: 30.5 % (ref 38.9–50.9)
HCT VFR BLD AUTO: 31 % (ref 38.9–50.9)
HCT VFR BLD AUTO: 31.8 % (ref 38.9–50.9)
HCT VFR BLD AUTO: 32 % (ref 38.9–50.9)
HCT VFR BLD AUTO: 34.3 % (ref 38.9–50.9)
HCT VFR BLD CALC: 27 %
HCT VFR BLD CALC: 27.1 %
HCT VFR BLD CALC: 27.8 %
HCT VFR BLD CALC: 28 %
HCT VFR BLD CALC: 31.8 %
HCT VFR BLDA CALC: 32 % (ref 38–51)
HEMOCCULT STL QL: NEGATIVE
HGB BLD-MCNC: 10 G/DL (ref 13.1–17.5)
HGB BLD-MCNC: 10 G/DL (ref 13.1–17.5)
HGB BLD-MCNC: 10.2 G/DL (ref 13.1–17.5)
HGB BLD-MCNC: 10.9 G/DL (ref 13.1–17.5)
HGB BLD-MCNC: 7 G/DL (ref 13.1–17.5)
HGB BLD-MCNC: 7 G/DL (ref 13.1–17.5)
HGB BLD-MCNC: 7.1 G/DL (ref 13.1–17.5)
HGB BLD-MCNC: 7.2 G/DL (ref 13.1–17.5)
HGB BLD-MCNC: 7.4 G/DL (ref 13.1–17.5)
HGB BLD-MCNC: 7.4 G/DL (ref 13.1–17.5)
HGB BLD-MCNC: 7.7 G/DL (ref 13.1–17.5)
HGB BLD-MCNC: 7.9 G/DL (ref 13.1–17.5)
HGB BLD-MCNC: 8 G/DL (ref 13.1–17.5)
HGB BLD-MCNC: 8.2 G/DL (ref 13.1–17.5)
HGB BLD-MCNC: 8.3 G/DL (ref 13.1–17.5)
HGB BLD-MCNC: 8.3 G/DL (ref 13.1–17.5)
HGB BLD-MCNC: 8.5 G/DL (ref 13.1–17.5)
HGB BLD-MCNC: 8.7 G/DL (ref 13.1–17.5)
HGB BLD-MCNC: 8.8 G/DL (ref 13.1–17.5)
HGB BLD-MCNC: 8.9 G/DL (ref 13.1–17.5)
HGB BLD-MCNC: 9.2 G/DL (ref 13.1–17.5)
HGB BLD-MCNC: 9.9 G/DL (ref 13.1–17.5)
HGB BLDA-MCNC: 10.4 G/DL (ref 13.5–17.5)
HGB BLDA-MCNC: 10.9 G/DL (ref 12–17)
HGB BLDA-MCNC: 8.8 G/DL (ref 13.5–17.5)
HGB BLDA-MCNC: 8.8 G/DL (ref 13.5–17.5)
HGB BLDA-MCNC: 9.1 G/DL (ref 13.5–17.5)
HGB BLDA-MCNC: 9.1 G/DL (ref 13.5–17.5)
HGB UR QL STRIP.AUTO: ABNORMAL
HOLD SPECIMEN: NORMAL
HOLD SPECIMEN: NORMAL
HOROWITZ INDEX BLD+IHG-RTO: 30 %
HOROWITZ INDEX BLD+IHG-RTO: 31 %
HOROWITZ INDEX BLD+IHG-RTO: 40 %
HYALINE CASTS UR QL AUTO: ABNORMAL /LPF
IGA SERPL-MCNC: 155 MG/DL (ref 61–437)
IGG SERPL-MCNC: 637 MG/DL (ref 700–1600)
IGM SERPL-MCNC: 55 MG/DL (ref 15–143)
IMM GRANULOCYTES # BLD: 0 10*3/MM3 (ref 0–0.03)
IMM GRANULOCYTES # BLD: 0.01 10*3/MM3 (ref 0–0.03)
IMM GRANULOCYTES # BLD: 0.02 10*3/MM3 (ref 0–0.03)
IMM GRANULOCYTES NFR BLD: 0 % (ref 0–0.6)
IMM GRANULOCYTES NFR BLD: 0.1 % (ref 0–0.6)
IMM GRANULOCYTES NFR BLD: 0.2 % (ref 0–0.6)
IMM GRANULOCYTES NFR BLD: 0.2 % (ref 0–0.6)
IMM GRANULOCYTES NFR BLD: 0.3 % (ref 0–0.6)
IMM GRANULOCYTES NFR BLD: 0.4 % (ref 0–0.6)
IMM GRANULOCYTES NFR BLD: 0.4 % (ref 0–0.6)
INR PPP: 1
INTERPRETATION SERPL IEP-IMP: ABNORMAL
IRON 24H UR-MRATE: 46 MCG/DL (ref 50–175)
IRON SATN MFR SERPL: 19 % (ref 20–50)
KETONES UR QL STRIP: ABNORMAL
KETONES UR QL STRIP: NEGATIVE
KETONES UR QL STRIP: NEGATIVE
LEFT ATRIUM VOLUME INDEX: 23.6 ML/M2
LEFT ATRIUM VOLUME: 52 CM3
LEUKOCYTE ESTERASE UR QL STRIP.AUTO: ABNORMAL
LEUKOCYTE ESTERASE UR QL STRIP.AUTO: NEGATIVE
LEUKOCYTE ESTERASE UR QL STRIP.AUTO: NEGATIVE
LISS SCREEN: NEGATIVE
LYMPHOCYTES # BLD AUTO: 0.73 10*3/MM3 (ref 0.6–4.8)
LYMPHOCYTES # BLD AUTO: 0.76 10*3/MM3 (ref 0.6–4.8)
LYMPHOCYTES # BLD AUTO: 0.97 10*3/MM3 (ref 0.6–4.8)
LYMPHOCYTES # BLD AUTO: 1.06 10*3/MM3 (ref 0.6–4.8)
LYMPHOCYTES # BLD AUTO: 1.13 10*3/MM3 (ref 0.6–4.8)
LYMPHOCYTES # BLD AUTO: 1.18 10*3/MM3 (ref 0.6–4.8)
LYMPHOCYTES # BLD AUTO: 1.28 10*3/MM3 (ref 0.6–4.8)
LYMPHOCYTES # BLD AUTO: 1.67 10*3/MM3 (ref 0.6–4.8)
LYMPHOCYTES # BLD AUTO: 1.95 10*3/MM3 (ref 0.6–4.8)
LYMPHOCYTES # BLD AUTO: 1.99 10*3/MM3 (ref 0.6–4.8)
LYMPHOCYTES NFR BLD AUTO: 11.9 % (ref 24–44)
LYMPHOCYTES NFR BLD AUTO: 12.4 % (ref 24–44)
LYMPHOCYTES NFR BLD AUTO: 18.1 % (ref 24–44)
LYMPHOCYTES NFR BLD AUTO: 18.4 % (ref 24–44)
LYMPHOCYTES NFR BLD AUTO: 19.5 % (ref 24–44)
LYMPHOCYTES NFR BLD AUTO: 21 % (ref 24–44)
LYMPHOCYTES NFR BLD AUTO: 23.3 % (ref 24–44)
LYMPHOCYTES NFR BLD AUTO: 24.4 % (ref 24–44)
LYMPHOCYTES NFR BLD AUTO: 27.1 % (ref 24–44)
LYMPHOCYTES NFR BLD AUTO: 27.3 % (ref 24–44)
Lab: ABNORMAL
M-SPIKE: ABNORMAL G/DL
MAGNESIUM SERPL-MCNC: 1.9 MG/DL (ref 1.3–2.7)
MAGNESIUM SERPL-MCNC: 2 MG/DL (ref 1.3–2.7)
MAGNESIUM SERPL-MCNC: 2.1 MG/DL (ref 1.3–2.7)
MAGNESIUM SERPL-MCNC: 2.2 MG/DL (ref 1.3–2.7)
MAGNESIUM SERPL-MCNC: 2.4 MG/DL (ref 1.3–2.7)
MAGNESIUM SERPL-MCNC: 2.4 MG/DL (ref 1.3–2.7)
MAGNESIUM SERPL-MCNC: 2.5 MG/DL (ref 1.3–2.7)
MAGNESIUM SERPL-MCNC: 2.7 MG/DL (ref 1.3–2.7)
MAGNESIUM SERPL-MCNC: 2.7 MG/DL (ref 1.3–2.7)
MCH RBC QN AUTO: 27.5 PG (ref 27–31)
MCH RBC QN AUTO: 27.6 PG (ref 27–31)
MCH RBC QN AUTO: 27.7 PG (ref 27–31)
MCH RBC QN AUTO: 27.8 PG (ref 27–31)
MCH RBC QN AUTO: 27.9 PG (ref 27–31)
MCH RBC QN AUTO: 27.9 PG (ref 27–31)
MCH RBC QN AUTO: 28 PG (ref 27–31)
MCH RBC QN AUTO: 28.3 PG (ref 27–31)
MCH RBC QN AUTO: 28.3 PG (ref 27–31)
MCH RBC QN AUTO: 28.5 PG (ref 27–31)
MCH RBC QN AUTO: 28.6 PG (ref 27–31)
MCH RBC QN AUTO: 28.6 PG (ref 27–31)
MCH RBC QN AUTO: 28.7 PG (ref 27–31)
MCH RBC QN AUTO: 29.1 PG (ref 27–31)
MCH RBC QN AUTO: 29.3 PG (ref 27–31)
MCHC RBC AUTO-ENTMCNC: 30.8 G/DL (ref 32–36)
MCHC RBC AUTO-ENTMCNC: 30.8 G/DL (ref 32–36)
MCHC RBC AUTO-ENTMCNC: 30.9 G/DL (ref 32–36)
MCHC RBC AUTO-ENTMCNC: 31.1 G/DL (ref 32–36)
MCHC RBC AUTO-ENTMCNC: 31.3 G/DL (ref 32–36)
MCHC RBC AUTO-ENTMCNC: 31.4 G/DL (ref 32–36)
MCHC RBC AUTO-ENTMCNC: 31.4 G/DL (ref 32–36)
MCHC RBC AUTO-ENTMCNC: 31.5 G/DL (ref 32–36)
MCHC RBC AUTO-ENTMCNC: 31.8 G/DL (ref 32–36)
MCHC RBC AUTO-ENTMCNC: 31.8 G/DL (ref 32–36)
MCHC RBC AUTO-ENTMCNC: 31.9 G/DL (ref 32–36)
MCHC RBC AUTO-ENTMCNC: 32.1 G/DL (ref 32–36)
MCHC RBC AUTO-ENTMCNC: 32.3 G/DL (ref 32–36)
MCHC RBC AUTO-ENTMCNC: 32.5 G/DL (ref 32–36)
MCHC RBC AUTO-ENTMCNC: 32.9 G/DL (ref 32–36)
MCV RBC AUTO: 87.8 FL (ref 80–99)
MCV RBC AUTO: 87.9 FL (ref 80–99)
MCV RBC AUTO: 88.3 FL (ref 80–99)
MCV RBC AUTO: 88.3 FL (ref 80–99)
MCV RBC AUTO: 88.4 FL (ref 80–99)
MCV RBC AUTO: 88.8 FL (ref 80–99)
MCV RBC AUTO: 88.9 FL (ref 80–99)
MCV RBC AUTO: 89 FL (ref 80–99)
MCV RBC AUTO: 89.1 FL (ref 80–99)
MCV RBC AUTO: 89.7 FL (ref 80–99)
MCV RBC AUTO: 89.7 FL (ref 80–99)
MCV RBC AUTO: 89.9 FL (ref 80–99)
MCV RBC AUTO: 90.2 FL (ref 80–99)
MCV RBC AUTO: 90.4 FL (ref 80–99)
MCV RBC AUTO: 90.4 FL (ref 80–99)
MCV RBC AUTO: 90.6 FL (ref 80–99)
MCV RBC AUTO: 90.8 FL (ref 80–99)
MCV RBC AUTO: 91.5 FL (ref 80–99)
MCV RBC AUTO: 93.1 FL (ref 80–99)
METHADONE UR QL SCN: NEGATIVE
METHADONE UR QL SCN: NEGATIVE
METHGB BLD QL: 0.8 % (ref 0–1.5)
METHGB BLD QL: 0.8 % (ref 0–1.5)
METHGB BLD QL: 0.9 % (ref 0–1.5)
METHGB BLD QL: 0.9 % (ref 0–1.5)
METHGB BLD QL: 1 % (ref 0–1.5)
METHYLMALONATE SERPL-SCNC: 413 NMOL/L (ref 0–378)
MODALITY: ABNORMAL
MONOCYTES # BLD AUTO: 0.62 10*3/MM3 (ref 0–1)
MONOCYTES # BLD AUTO: 0.69 10*3/MM3 (ref 0–1)
MONOCYTES # BLD AUTO: 0.7 10*3/MM3 (ref 0–1)
MONOCYTES # BLD AUTO: 0.71 10*3/MM3 (ref 0–1)
MONOCYTES # BLD AUTO: 0.76 10*3/MM3 (ref 0–1)
MONOCYTES # BLD AUTO: 0.77 10*3/MM3 (ref 0–1)
MONOCYTES # BLD AUTO: 0.78 10*3/MM3 (ref 0–1)
MONOCYTES # BLD AUTO: 0.97 10*3/MM3 (ref 0–1)
MONOCYTES # BLD AUTO: 0.98 10*3/MM3 (ref 0–1)
MONOCYTES # BLD AUTO: 0.98 10*3/MM3 (ref 0–1)
MONOCYTES NFR BLD AUTO: 10.4 % (ref 0–12)
MONOCYTES NFR BLD AUTO: 10.5 % (ref 0–12)
MONOCYTES NFR BLD AUTO: 10.7 % (ref 0–12)
MONOCYTES NFR BLD AUTO: 11.3 % (ref 0–12)
MONOCYTES NFR BLD AUTO: 11.4 % (ref 0–12)
MONOCYTES NFR BLD AUTO: 12.4 % (ref 0–12)
MONOCYTES NFR BLD AUTO: 13.3 % (ref 0–12)
MONOCYTES NFR BLD AUTO: 15.2 % (ref 0–12)
MONOCYTES NFR BLD AUTO: 17.6 % (ref 0–12)
MONOCYTES NFR BLD AUTO: 18.2 % (ref 0–12)
MYELOPEROXIDASE AB SER-ACNC: <9 U/ML (ref 0–9)
NEUTROPHILS # BLD AUTO: 2.98 10*3/MM3 (ref 1.5–8.3)
NEUTROPHILS # BLD AUTO: 3.08 10*3/MM3 (ref 1.5–8.3)
NEUTROPHILS # BLD AUTO: 3.25 10*3/MM3 (ref 1.5–8.3)
NEUTROPHILS # BLD AUTO: 3.77 10*3/MM3 (ref 1.5–8.3)
NEUTROPHILS # BLD AUTO: 4.03 10*3/MM3 (ref 1.5–8.3)
NEUTROPHILS # BLD AUTO: 4.11 10*3/MM3 (ref 1.5–8.3)
NEUTROPHILS # BLD AUTO: 4.17 10*3/MM3 (ref 1.5–8.3)
NEUTROPHILS # BLD AUTO: 4.22 10*3/MM3 (ref 1.5–8.3)
NEUTROPHILS # BLD AUTO: 4.45 10*3/MM3 (ref 1.5–8.3)
NEUTROPHILS # BLD AUTO: 4.53 10*3/MM3 (ref 1.5–8.3)
NEUTROPHILS NFR BLD AUTO: 54.2 % (ref 41–71)
NEUTROPHILS NFR BLD AUTO: 56 % (ref 41–71)
NEUTROPHILS NFR BLD AUTO: 57 % (ref 41–71)
NEUTROPHILS NFR BLD AUTO: 58.5 % (ref 41–71)
NEUTROPHILS NFR BLD AUTO: 61.8 % (ref 41–71)
NEUTROPHILS NFR BLD AUTO: 62.7 % (ref 41–71)
NEUTROPHILS NFR BLD AUTO: 64.3 % (ref 41–71)
NEUTROPHILS NFR BLD AUTO: 65.3 % (ref 41–71)
NEUTROPHILS NFR BLD AUTO: 72.7 % (ref 41–71)
NEUTROPHILS NFR BLD AUTO: 74.1 % (ref 41–71)
NITRITE UR QL STRIP: NEGATIVE
NONSPECIFIC COLD ANTIBODY: NORMAL
OPIATES UR QL: NEGATIVE
OPIATES UR QL: NEGATIVE
OSMOLALITY UR: 429 MOSM/KG (ref 300–1100)
OXYCODONE UR QL SCN: NEGATIVE
OXYCODONE UR QL SCN: NEGATIVE
OXYHGB MFR BLDV: 90.5 % (ref 94–99)
OXYHGB MFR BLDV: 92.3 % (ref 94–99)
OXYHGB MFR BLDV: 92.4 % (ref 94–99)
OXYHGB MFR BLDV: 96.8 % (ref 94–99)
OXYHGB MFR BLDV: 96.8 % (ref 94–99)
P-ANCA ATYPICAL TITR SER IF: NORMAL TITER
P-ANCA TITR SER IF: NORMAL TITER
PCO2 BLDA: 33.1 MM HG (ref 35–48)
PCO2 BLDA: 34.9 MM HG (ref 35–48)
PCO2 BLDA: 37.9 MM HG (ref 35–48)
PCO2 BLDA: 38.4 MM HG (ref 35–48)
PCO2 BLDA: 40.5 MM HG (ref 35–48)
PCP UR QL SCN: NEGATIVE
PCP UR QL SCN: NEGATIVE
PH BLDA: 7.31 PH UNITS (ref 7.35–7.45)
PH BLDA: 7.35 PH UNITS (ref 7.35–7.45)
PH BLDA: 7.39 PH UNITS (ref 7.35–7.45)
PH BLDA: 7.44 PH UNITS (ref 7.35–7.45)
PH BLDA: 7.46 PH UNITS (ref 7.35–7.45)
PH UR STRIP.AUTO: 5.5 [PH] (ref 5–8)
PH UR STRIP.AUTO: 6 [PH] (ref 5–8)
PH UR STRIP.AUTO: <=5 [PH] (ref 5–8)
PHOSPHATE SERPL-MCNC: 3.4 MG/DL (ref 2.4–5.1)
PHOSPHATE SERPL-MCNC: 3.8 MG/DL (ref 2.4–5.1)
PHOSPHATE SERPL-MCNC: 3.9 MG/DL (ref 2.4–5.1)
PHOSPHATE SERPL-MCNC: 3.9 MG/DL (ref 2.4–5.1)
PHOSPHATE SERPL-MCNC: 4 MG/DL (ref 2.4–5.1)
PHOSPHATE SERPL-MCNC: 4.1 MG/DL (ref 2.4–5.1)
PHOSPHATE SERPL-MCNC: 4.2 MG/DL (ref 2.4–5.1)
PHOSPHATE SERPL-MCNC: 4.3 MG/DL (ref 2.4–5.1)
PHOSPHATE SERPL-MCNC: 4.4 MG/DL (ref 2.4–5.1)
PHOSPHATE SERPL-MCNC: 4.6 MG/DL (ref 2.4–5.1)
PHOSPHATE SERPL-MCNC: 4.6 MG/DL (ref 2.4–5.1)
PHOSPHATE SERPL-MCNC: 4.7 MG/DL (ref 2.4–5.1)
PHOSPHATE SERPL-MCNC: 4.7 MG/DL (ref 2.4–5.1)
PHOSPHATE SERPL-MCNC: 5 MG/DL (ref 2.4–5.1)
PHOSPHATE SERPL-MCNC: 5.1 MG/DL (ref 2.4–5.1)
PHOSPHATE SERPL-MCNC: 5.4 MG/DL (ref 2.4–5.1)
PHOSPHATE SERPL-MCNC: 5.7 MG/DL (ref 2.4–5.1)
PLATELET # BLD AUTO: 111 10*3/MM3 (ref 150–450)
PLATELET # BLD AUTO: 119 10*3/MM3 (ref 150–450)
PLATELET # BLD AUTO: 124 10*3/MM3 (ref 150–450)
PLATELET # BLD AUTO: 126 10*3/MM3 (ref 150–450)
PLATELET # BLD AUTO: 139 10*3/MM3 (ref 150–450)
PLATELET # BLD AUTO: 140 10*3/MM3 (ref 150–450)
PLATELET # BLD AUTO: 146 10*3/MM3 (ref 150–450)
PLATELET # BLD AUTO: 147 10*3/MM3 (ref 150–450)
PLATELET # BLD AUTO: 153 10*3/MM3 (ref 150–450)
PLATELET # BLD AUTO: 157 10*3/MM3 (ref 150–450)
PLATELET # BLD AUTO: 163 10*3/MM3 (ref 150–450)
PLATELET # BLD AUTO: 164 10*3/MM3 (ref 150–450)
PLATELET # BLD AUTO: 168 10*3/MM3 (ref 150–450)
PLATELET # BLD AUTO: 169 10*3/MM3 (ref 150–450)
PLATELET # BLD AUTO: 170 10*3/MM3 (ref 150–450)
PLATELET # BLD AUTO: 181 10*3/MM3 (ref 150–450)
PLATELET # BLD AUTO: 187 10*3/MM3 (ref 150–450)
PLATELET # BLD AUTO: 187 10*3/MM3 (ref 150–450)
PLATELET # BLD AUTO: 202 10*3/MM3 (ref 150–450)
PMV BLD AUTO: 10.2 FL (ref 6–12)
PMV BLD AUTO: 10.4 FL (ref 6–12)
PMV BLD AUTO: 10.4 FL (ref 6–12)
PMV BLD AUTO: 10.6 FL (ref 6–12)
PMV BLD AUTO: 10.6 FL (ref 6–12)
PMV BLD AUTO: 10.7 FL (ref 6–12)
PMV BLD AUTO: 10.8 FL (ref 6–12)
PMV BLD AUTO: 10.9 FL (ref 6–12)
PMV BLD AUTO: 11 FL (ref 6–12)
PMV BLD AUTO: 11.2 FL (ref 6–12)
PMV BLD AUTO: 11.3 FL (ref 6–12)
PMV BLD AUTO: 11.3 FL (ref 6–12)
PMV BLD AUTO: 11.5 FL (ref 6–12)
PMV BLD AUTO: 11.7 FL (ref 6–12)
PMV BLD AUTO: 12.1 FL (ref 6–12)
PO2 BLDA: 100 MM HG (ref 83–108)
PO2 BLDA: 103 MM HG (ref 83–108)
PO2 BLDA: 61.3 MM HG (ref 83–108)
PO2 BLDA: 70.9 MM HG (ref 83–108)
PO2 BLDA: 73.2 MM HG (ref 83–108)
POTASSIUM BLD-SCNC: 3 MMOL/L (ref 3.5–5.5)
POTASSIUM BLD-SCNC: 3.2 MMOL/L (ref 3.5–5.5)
POTASSIUM BLD-SCNC: 3.6 MMOL/L (ref 3.5–5.5)
POTASSIUM BLD-SCNC: 3.6 MMOL/L (ref 3.5–5.5)
POTASSIUM BLD-SCNC: 3.7 MMOL/L (ref 3.5–5.5)
POTASSIUM BLD-SCNC: 3.7 MMOL/L (ref 3.5–5.5)
POTASSIUM BLD-SCNC: 3.8 MMOL/L (ref 3.5–5.5)
POTASSIUM BLD-SCNC: 3.8 MMOL/L (ref 3.5–5.5)
POTASSIUM BLD-SCNC: 3.9 MMOL/L (ref 3.5–5.5)
POTASSIUM BLD-SCNC: 4 MMOL/L (ref 3.5–5.5)
POTASSIUM BLD-SCNC: 4.1 MMOL/L (ref 3.5–5.5)
POTASSIUM BLD-SCNC: 4.3 MMOL/L (ref 3.5–5.5)
POTASSIUM BLD-SCNC: 4.5 MMOL/L (ref 3.5–5.5)
POTASSIUM BLD-SCNC: 4.6 MMOL/L (ref 3.5–5.5)
POTASSIUM BLD-SCNC: 4.7 MMOL/L (ref 3.5–5.5)
POTASSIUM BLD-SCNC: 4.8 MMOL/L (ref 3.5–5.5)
POTASSIUM BLD-SCNC: 5.4 MMOL/L (ref 3.5–5.5)
POTASSIUM BLDA-SCNC: 4 MMOL/L (ref 3.5–4.9)
PREALB SERPL-MCNC: 18.5 MG/DL (ref 10–40)
PREALB SERPL-MCNC: 20 MG/DL (ref 10–40)
PREALB SERPL-MCNC: 8.6 MG/DL (ref 10–40)
PROPOXYPH UR QL: NEGATIVE
PROPOXYPH UR QL: NEGATIVE
PROT SERPL-MCNC: 4.7 G/DL (ref 5.7–8.2)
PROT SERPL-MCNC: 5 G/DL (ref 6–8.5)
PROT SERPL-MCNC: 5.1 G/DL (ref 5.7–8.2)
PROT SERPL-MCNC: 5.8 G/DL (ref 5.7–8.2)
PROT SERPL-MCNC: 6 G/DL (ref 5.7–8.2)
PROT SERPL-MCNC: 6 G/DL (ref 5.7–8.2)
PROT SERPL-MCNC: 6.1 G/DL (ref 5.7–8.2)
PROT SERPL-MCNC: 6.2 G/DL (ref 5.7–8.2)
PROT SERPL-MCNC: 6.2 G/DL (ref 5.7–8.2)
PROT SERPL-MCNC: 6.3 G/DL (ref 5.7–8.2)
PROT SERPL-MCNC: 6.4 G/DL (ref 5.7–8.2)
PROT SERPL-MCNC: 6.5 G/DL (ref 5.7–8.2)
PROT SERPL-MCNC: 6.6 G/DL (ref 5.7–8.2)
PROT SERPL-MCNC: 6.8 G/DL (ref 5.7–8.2)
PROT UR QL STRIP: ABNORMAL
PROT UR-MCNC: >1250 MG/DL (ref 1–14)
PROTEINASE3 AB SER IA-ACNC: <3.5 U/ML (ref 0–3.5)
PROTHROMBIN TIME: 10.9 SECONDS (ref 9.6–11.5)
RBC # BLD AUTO: 2.53 10*6/MM3 (ref 4.2–5.76)
RBC # BLD AUTO: 2.58 10*6/MM3 (ref 4.2–5.76)
RBC # BLD AUTO: 2.6 10*6/MM3 (ref 4.2–5.76)
RBC # BLD AUTO: 2.64 10*6/MM3 (ref 4.2–5.76)
RBC # BLD AUTO: 2.8 10*6/MM3 (ref 4.2–5.76)
RBC # BLD AUTO: 2.83 10*6/MM3 (ref 4.2–5.76)
RBC # BLD AUTO: 2.94 10*6/MM3 (ref 4.2–5.76)
RBC # BLD AUTO: 3.04 10*6/MM3 (ref 4.2–5.76)
RBC # BLD AUTO: 3.08 10*6/MM3 (ref 4.2–5.76)
RBC # BLD AUTO: 3.11 10*6/MM3 (ref 4.2–5.76)
RBC # BLD AUTO: 3.14 10*6/MM3 (ref 4.2–5.76)
RBC # BLD AUTO: 3.21 10*6/MM3 (ref 4.2–5.76)
RBC # BLD AUTO: 3.29 10*6/MM3 (ref 4.2–5.76)
RBC # BLD AUTO: 3.29 10*6/MM3 (ref 4.2–5.76)
RBC # BLD AUTO: 3.4 10*6/MM3 (ref 4.2–5.76)
RBC # BLD AUTO: 3.49 10*6/MM3 (ref 4.2–5.76)
RBC # BLD AUTO: 3.56 10*6/MM3 (ref 4.2–5.76)
RBC # BLD AUTO: 3.6 10*6/MM3 (ref 4.2–5.76)
RBC # BLD AUTO: 3.9 10*6/MM3 (ref 4.2–5.76)
RBC # UR: ABNORMAL /HPF
REF LAB TEST METHOD: ABNORMAL
RH BLD: POSITIVE
RH BLD: POSITIVE
SAO2 % BLDCOA: 90.5 %
SODIUM BLD-SCNC: 136 MMOL/L (ref 132–146)
SODIUM BLD-SCNC: 138 MMOL/L (ref 132–146)
SODIUM BLD-SCNC: 141 MMOL/L (ref 132–146)
SODIUM BLD-SCNC: 142 MMOL/L (ref 132–146)
SODIUM BLD-SCNC: 142 MMOL/L (ref 132–146)
SODIUM BLD-SCNC: 143 MMOL/L (ref 132–146)
SODIUM BLD-SCNC: 144 MMOL/L (ref 132–146)
SODIUM BLD-SCNC: 146 MMOL/L (ref 132–146)
SODIUM BLD-SCNC: 147 MMOL/L (ref 132–146)
SODIUM BLD-SCNC: 147 MMOL/L (ref 132–146)
SODIUM BLD-SCNC: 148 MMOL/L (ref 132–146)
SODIUM BLD-SCNC: 149 MMOL/L (ref 132–146)
SODIUM BLD-SCNC: 150 MMOL/L (ref 132–146)
SODIUM BLD-SCNC: 150 MMOL/L (ref 132–146)
SODIUM BLD-SCNC: 151 MMOL/L (ref 132–146)
SODIUM BLD-SCNC: 155 MMOL/L (ref 132–146)
SODIUM BLDA-SCNC: 142 MMOL/L (ref 138–146)
SODIUM UR-SCNC: 13 MMOL/L (ref 30–90)
SODIUM UR-SCNC: 52 MMOL/L (ref 30–90)
SP GR UR STRIP: 1.02 (ref 1–1.03)
SP GR UR STRIP: 1.02 (ref 1–1.03)
SP GR UR STRIP: 1.04 (ref 1–1.03)
SQUAMOUS #/AREA URNS HPF: ABNORMAL /HPF
T4 FREE SERPL-MCNC: 1.04 NG/DL (ref 0.89–1.76)
TIBC SERPL-MCNC: 242 MCG/DL (ref 250–450)
TRICYCLICS UR QL SCN: NEGATIVE
TRICYCLICS UR QL SCN: NEGATIVE
TRIGL SERPL-MCNC: 103 MG/DL (ref 0–150)
TRIGL SERPL-MCNC: 114 MG/DL (ref 0–150)
TRIGL SERPL-MCNC: 130 MG/DL (ref 0–150)
TRIGL SERPL-MCNC: 56 MG/DL (ref 0–150)
TRIGL SERPL-MCNC: 80 MG/DL (ref 0–150)
TRIGL SERPL-MCNC: 99 MG/DL (ref 0–150)
TROPONIN I SERPL-MCNC: 0.1 NG/ML (ref 0–0.07)
TROPONIN I SERPL-MCNC: 0.11 NG/ML (ref 0–0.07)
TROPONIN I SERPL-MCNC: 0.17 NG/ML
TROPONIN I SERPL-MCNC: 0.17 NG/ML
TROPONIN I SERPL-MCNC: 0.2 NG/ML
TROPONIN I SERPL-MCNC: 0.28 NG/ML
TROPONIN I SERPL-MCNC: 0.28 NG/ML
TROPONIN I SERPL-MCNC: 0.31 NG/ML
TSH SERPL DL<=0.05 MIU/L-ACNC: 2.09 MIU/ML (ref 0.35–5.35)
UNIT  ABO: NORMAL
UNIT  ABO: NORMAL
UNIT  RH: NORMAL
UNIT  RH: NORMAL
UROBILINOGEN UR QL STRIP: ABNORMAL
VIT B12 BLD-MCNC: 1420 PG/ML (ref 211–911)
VIT B12 BLD-MCNC: 506 PG/ML (ref 211–911)
WBC NRBC COR # BLD: 4.31 10*3/MM3 (ref 3.5–10.8)
WBC NRBC COR # BLD: 4.45 10*3/MM3 (ref 3.5–10.8)
WBC NRBC COR # BLD: 4.54 10*3/MM3 (ref 3.5–10.8)
WBC NRBC COR # BLD: 4.64 10*3/MM3 (ref 3.5–10.8)
WBC NRBC COR # BLD: 4.68 10*3/MM3 (ref 3.5–10.8)
WBC NRBC COR # BLD: 4.98 10*3/MM3 (ref 3.5–10.8)
WBC NRBC COR # BLD: 5.02 10*3/MM3 (ref 3.5–10.8)
WBC NRBC COR # BLD: 5.34 10*3/MM3 (ref 3.5–10.8)
WBC NRBC COR # BLD: 5.39 10*3/MM3 (ref 3.5–10.8)
WBC NRBC COR # BLD: 5.5 10*3/MM3 (ref 3.5–10.8)
WBC NRBC COR # BLD: 5.87 10*3/MM3 (ref 3.5–10.8)
WBC NRBC COR # BLD: 6.12 10*3/MM3 (ref 3.5–10.8)
WBC NRBC COR # BLD: 6.12 10*3/MM3 (ref 3.5–10.8)
WBC NRBC COR # BLD: 6.43 10*3/MM3 (ref 3.5–10.8)
WBC NRBC COR # BLD: 6.49 10*3/MM3 (ref 3.5–10.8)
WBC NRBC COR # BLD: 6.71 10*3/MM3 (ref 3.5–10.8)
WBC NRBC COR # BLD: 6.84 10*3/MM3 (ref 3.5–10.8)
WBC NRBC COR # BLD: 7.13 10*3/MM3 (ref 3.5–10.8)
WBC NRBC COR # BLD: 7.34 10*3/MM3 (ref 3.5–10.8)
WBC UR QL AUTO: ABNORMAL /HPF
WHOLE BLOOD HOLD SPECIMEN: NORMAL
WHOLE BLOOD HOLD SPECIMEN: NORMAL

## 2017-01-01 PROCEDURE — C1892 INTRO/SHEATH,FIXED,PEEL-AWAY: HCPCS | Performed by: INTERNAL MEDICINE

## 2017-01-01 PROCEDURE — 99152 MOD SED SAME PHYS/QHP 5/>YRS: CPT | Performed by: INTERNAL MEDICINE

## 2017-01-01 PROCEDURE — 25010000002 MORPHINE PER 10 MG: Performed by: INTERNAL MEDICINE

## 2017-01-01 PROCEDURE — 82607 VITAMIN B-12: CPT | Performed by: INTERNAL MEDICINE

## 2017-01-01 PROCEDURE — 99233 SBSQ HOSP IP/OBS HIGH 50: CPT | Performed by: INTERNAL MEDICINE

## 2017-01-01 PROCEDURE — 97110 THERAPEUTIC EXERCISES: CPT

## 2017-01-01 PROCEDURE — 25010000002 HEPARIN (PORCINE) PER 1000 UNITS: Performed by: NURSE PRACTITIONER

## 2017-01-01 PROCEDURE — 86038 ANTINUCLEAR ANTIBODIES: CPT | Performed by: INTERNAL MEDICINE

## 2017-01-01 PROCEDURE — 85025 COMPLETE CBC W/AUTO DIFF WBC: CPT | Performed by: INTERNAL MEDICINE

## 2017-01-01 PROCEDURE — 82962 GLUCOSE BLOOD TEST: CPT

## 2017-01-01 PROCEDURE — 25010000002 FUROSEMIDE PER 20 MG: Performed by: NURSE PRACTITIONER

## 2017-01-01 PROCEDURE — 94799 UNLISTED PULMONARY SVC/PX: CPT

## 2017-01-01 PROCEDURE — 84100 ASSAY OF PHOSPHORUS: CPT | Performed by: INTERNAL MEDICINE

## 2017-01-01 PROCEDURE — 71010 HC CHEST PA OR AP: CPT

## 2017-01-01 PROCEDURE — 25010000002 HEPARIN (PORCINE) PER 1000 UNITS: Performed by: INTERNAL MEDICINE

## 2017-01-01 PROCEDURE — 97530 THERAPEUTIC ACTIVITIES: CPT

## 2017-01-01 PROCEDURE — 36600 WITHDRAWAL OF ARTERIAL BLOOD: CPT | Performed by: NURSE PRACTITIONER

## 2017-01-01 PROCEDURE — 86140 C-REACTIVE PROTEIN: CPT | Performed by: INTERNAL MEDICINE

## 2017-01-01 PROCEDURE — P9046 ALBUMIN (HUMAN), 25%, 20 ML: HCPCS | Performed by: INTERNAL MEDICINE

## 2017-01-01 PROCEDURE — 94640 AIRWAY INHALATION TREATMENT: CPT

## 2017-01-01 PROCEDURE — 25010000002 LORAZEPAM PER 2 MG: Performed by: NURSE PRACTITIONER

## 2017-01-01 PROCEDURE — 80069 RENAL FUNCTION PANEL: CPT | Performed by: INTERNAL MEDICINE

## 2017-01-01 PROCEDURE — 84300 ASSAY OF URINE SODIUM: CPT | Performed by: INTERNAL MEDICINE

## 2017-01-01 PROCEDURE — 83520 IMMUNOASSAY QUANT NOS NONAB: CPT | Performed by: INTERNAL MEDICINE

## 2017-01-01 PROCEDURE — 25010000002 CEFTRIAXONE PER 250 MG: Performed by: INTERNAL MEDICINE

## 2017-01-01 PROCEDURE — 73090 X-RAY EXAM OF FOREARM: CPT

## 2017-01-01 PROCEDURE — 25010000002 MIDAZOLAM PER 1 MG: Performed by: INTERNAL MEDICINE

## 2017-01-01 PROCEDURE — 82746 ASSAY OF FOLIC ACID SERUM: CPT | Performed by: INTERNAL MEDICINE

## 2017-01-01 PROCEDURE — 99232 SBSQ HOSP IP/OBS MODERATE 35: CPT | Performed by: INTERNAL MEDICINE

## 2017-01-01 PROCEDURE — 82805 BLOOD GASES W/O2 SATURATION: CPT | Performed by: NURSE PRACTITIONER

## 2017-01-01 PROCEDURE — 97166 OT EVAL MOD COMPLEX 45 MIN: CPT

## 2017-01-01 PROCEDURE — 36600 WITHDRAWAL OF ARTERIAL BLOOD: CPT | Performed by: INTERNAL MEDICINE

## 2017-01-01 PROCEDURE — 82140 ASSAY OF AMMONIA: CPT | Performed by: FAMILY MEDICINE

## 2017-01-01 PROCEDURE — 99233 SBSQ HOSP IP/OBS HIGH 50: CPT | Performed by: FAMILY MEDICINE

## 2017-01-01 PROCEDURE — 70486 CT MAXILLOFACIAL W/O DYE: CPT

## 2017-01-01 PROCEDURE — 25010000002 LORAZEPAM PER 2 MG: Performed by: INTERNAL MEDICINE

## 2017-01-01 PROCEDURE — 97610 LOW FREQUENCY NON-THERMAL US: CPT

## 2017-01-01 PROCEDURE — 84134 ASSAY OF PREALBUMIN: CPT | Performed by: INTERNAL MEDICINE

## 2017-01-01 PROCEDURE — 81001 URINALYSIS AUTO W/SCOPE: CPT | Performed by: INTERNAL MEDICINE

## 2017-01-01 PROCEDURE — 80306 DRUG TEST PRSMV INSTRMNT: CPT | Performed by: INTERNAL MEDICINE

## 2017-01-01 PROCEDURE — 99224 PR SBSQ OBSERVATION CARE/DAY 15 MINUTES: CPT | Performed by: PHYSICIAN ASSISTANT

## 2017-01-01 PROCEDURE — 86900 BLOOD TYPING SEROLOGIC ABO: CPT

## 2017-01-01 PROCEDURE — 82375 ASSAY CARBOXYHB QUANT: CPT | Performed by: INTERNAL MEDICINE

## 2017-01-01 PROCEDURE — 94660 CPAP INITIATION&MGMT: CPT

## 2017-01-01 PROCEDURE — 86870 RBC ANTIBODY IDENTIFICATION: CPT | Performed by: FAMILY MEDICINE

## 2017-01-01 PROCEDURE — 99232 SBSQ HOSP IP/OBS MODERATE 35: CPT | Performed by: PHYSICIAN ASSISTANT

## 2017-01-01 PROCEDURE — 93010 ELECTROCARDIOGRAM REPORT: CPT | Performed by: INTERNAL MEDICINE

## 2017-01-01 PROCEDURE — 25810000003 DEXTROSE 5 % WITH KCL 20 MEQ 20-5 MEQ/L-% SOLUTION: Performed by: INTERNAL MEDICINE

## 2017-01-01 PROCEDURE — P9041 ALBUMIN (HUMAN),5%, 50ML: HCPCS | Performed by: INTERNAL MEDICINE

## 2017-01-01 PROCEDURE — 94760 N-INVAS EAR/PLS OXIMETRY 1: CPT

## 2017-01-01 PROCEDURE — 25010000002 THIAMINE PER 100 MG: Performed by: FAMILY MEDICINE

## 2017-01-01 PROCEDURE — 25010000002 HALOPERIDOL LACTATE PER 5 MG: Performed by: INTERNAL MEDICINE

## 2017-01-01 PROCEDURE — 99231 SBSQ HOSP IP/OBS SF/LOW 25: CPT | Performed by: PHYSICIAN ASSISTANT

## 2017-01-01 PROCEDURE — 86256 FLUORESCENT ANTIBODY TITER: CPT | Performed by: INTERNAL MEDICINE

## 2017-01-01 PROCEDURE — 99232 SBSQ HOSP IP/OBS MODERATE 35: CPT | Performed by: FAMILY MEDICINE

## 2017-01-01 PROCEDURE — 85018 HEMOGLOBIN: CPT | Performed by: FAMILY MEDICINE

## 2017-01-01 PROCEDURE — 86920 COMPATIBILITY TEST SPIN: CPT

## 2017-01-01 PROCEDURE — 80053 COMPREHEN METABOLIC PANEL: CPT | Performed by: INTERNAL MEDICINE

## 2017-01-01 PROCEDURE — 92610 EVALUATE SWALLOWING FUNCTION: CPT

## 2017-01-01 PROCEDURE — 82330 ASSAY OF CALCIUM: CPT | Performed by: INTERNAL MEDICINE

## 2017-01-01 PROCEDURE — 99231 SBSQ HOSP IP/OBS SF/LOW 25: CPT | Performed by: INTERNAL MEDICINE

## 2017-01-01 PROCEDURE — 84443 ASSAY THYROID STIM HORMONE: CPT | Performed by: INTERNAL MEDICINE

## 2017-01-01 PROCEDURE — 81003 URINALYSIS AUTO W/O SCOPE: CPT | Performed by: INTERNAL MEDICINE

## 2017-01-01 PROCEDURE — 85027 COMPLETE CBC AUTOMATED: CPT | Performed by: INTERNAL MEDICINE

## 2017-01-01 PROCEDURE — 63710000001 INSULIN REGULAR HUMAN PER 5 UNITS: Performed by: NURSE PRACTITIONER

## 2017-01-01 PROCEDURE — 25010000002 ALBUMIN HUMAN 25% PER 50 ML: Performed by: NURSE PRACTITIONER

## 2017-01-01 PROCEDURE — 84478 ASSAY OF TRIGLYCERIDES: CPT | Performed by: INTERNAL MEDICINE

## 2017-01-01 PROCEDURE — 84165 PROTEIN E-PHORESIS SERUM: CPT | Performed by: INTERNAL MEDICINE

## 2017-01-01 PROCEDURE — 74000 HC ABDOMEN KUB: CPT

## 2017-01-01 PROCEDURE — 99153 MOD SED SAME PHYS/QHP EA: CPT | Performed by: INTERNAL MEDICINE

## 2017-01-01 PROCEDURE — P9016 RBC LEUKOCYTES REDUCED: HCPCS

## 2017-01-01 PROCEDURE — 82465 ASSAY BLD/SERUM CHOLESTEROL: CPT | Performed by: INTERNAL MEDICINE

## 2017-01-01 PROCEDURE — 82805 BLOOD GASES W/O2 SATURATION: CPT | Performed by: INTERNAL MEDICINE

## 2017-01-01 PROCEDURE — 80053 COMPREHEN METABOLIC PANEL: CPT

## 2017-01-01 PROCEDURE — 0JH606Z INSERTION OF PACEMAKER, DUAL CHAMBER INTO CHEST SUBCUTANEOUS TISSUE AND FASCIA, OPEN APPROACH: ICD-10-PCS | Performed by: INTERNAL MEDICINE

## 2017-01-01 PROCEDURE — 97530 THERAPEUTIC ACTIVITIES: CPT | Performed by: OCCUPATIONAL THERAPIST

## 2017-01-01 PROCEDURE — 99291 CRITICAL CARE FIRST HOUR: CPT | Performed by: INTERNAL MEDICINE

## 2017-01-01 PROCEDURE — 83735 ASSAY OF MAGNESIUM: CPT | Performed by: INTERNAL MEDICINE

## 2017-01-01 PROCEDURE — 82272 OCCULT BLD FECES 1-3 TESTS: CPT | Performed by: INTERNAL MEDICINE

## 2017-01-01 PROCEDURE — 25010000002 THIAMINE PER 100 MG: Performed by: NURSE PRACTITIONER

## 2017-01-01 PROCEDURE — 93005 ELECTROCARDIOGRAM TRACING: CPT

## 2017-01-01 PROCEDURE — 36430 TRANSFUSION BLD/BLD COMPNT: CPT

## 2017-01-01 PROCEDURE — 92612 ENDOSCOPY SWALLOW (FEES) VID: CPT

## 2017-01-01 PROCEDURE — 97163 PT EVAL HIGH COMPLEX 45 MIN: CPT

## 2017-01-01 PROCEDURE — 02H63JZ INSERTION OF PACEMAKER LEAD INTO RIGHT ATRIUM, PERCUTANEOUS APPROACH: ICD-10-PCS | Performed by: INTERNAL MEDICINE

## 2017-01-01 PROCEDURE — 92526 ORAL FUNCTION THERAPY: CPT

## 2017-01-01 PROCEDURE — 82746 ASSAY OF FOLIC ACID SERUM: CPT | Performed by: FAMILY MEDICINE

## 2017-01-01 PROCEDURE — 97164 PT RE-EVAL EST PLAN CARE: CPT

## 2017-01-01 PROCEDURE — 25010000002 FUROSEMIDE PER 20 MG: Performed by: INTERNAL MEDICINE

## 2017-01-01 PROCEDURE — 86901 BLOOD TYPING SEROLOGIC RH(D): CPT

## 2017-01-01 PROCEDURE — 25010000002 ALBUMIN HUMAN 5% PER 50 ML: Performed by: INTERNAL MEDICINE

## 2017-01-01 PROCEDURE — 83735 ASSAY OF MAGNESIUM: CPT | Performed by: NURSE PRACTITIONER

## 2017-01-01 PROCEDURE — 25010000002 CHLOROTHIAZIDE PER 500 MG: Performed by: INTERNAL MEDICINE

## 2017-01-01 PROCEDURE — P9046 ALBUMIN (HUMAN), 25%, 20 ML: HCPCS | Performed by: NURSE PRACTITIONER

## 2017-01-01 PROCEDURE — 25010000002 FENTANYL CITRATE (PF) 100 MCG/2ML SOLUTION: Performed by: INTERNAL MEDICINE

## 2017-01-01 PROCEDURE — 99285 EMERGENCY DEPT VISIT HI MDM: CPT

## 2017-01-01 PROCEDURE — 25010000002 HYDROMORPHONE PER 4 MG: Performed by: NURSE PRACTITIONER

## 2017-01-01 PROCEDURE — 80053 COMPREHEN METABOLIC PANEL: CPT | Performed by: NURSE PRACTITIONER

## 2017-01-01 PROCEDURE — 25010000002 DIAZEPAM PER 5 MG: Performed by: NURSE PRACTITIONER

## 2017-01-01 PROCEDURE — 76705 ECHO EXAM OF ABDOMEN: CPT

## 2017-01-01 PROCEDURE — 82570 ASSAY OF URINE CREATININE: CPT | Performed by: INTERNAL MEDICINE

## 2017-01-01 PROCEDURE — 83735 ASSAY OF MAGNESIUM: CPT

## 2017-01-01 PROCEDURE — 80047 BASIC METABLC PNL IONIZED CA: CPT

## 2017-01-01 PROCEDURE — 86900 BLOOD TYPING SEROLOGIC ABO: CPT | Performed by: FAMILY MEDICINE

## 2017-01-01 PROCEDURE — 25010000002 HALOPERIDOL LACTATE PER 5 MG: Performed by: FAMILY MEDICINE

## 2017-01-01 PROCEDURE — 86922 COMPATIBILITY TEST ANTIGLOB: CPT

## 2017-01-01 PROCEDURE — 25010000002 VANCOMYCIN HCL IN NACL 2-0.9 GM/500ML-% SOLUTION: Performed by: PHYSICIAN ASSISTANT

## 2017-01-01 PROCEDURE — 85730 THROMBOPLASTIN TIME PARTIAL: CPT | Performed by: INTERNAL MEDICINE

## 2017-01-01 PROCEDURE — 70450 CT HEAD/BRAIN W/O DYE: CPT

## 2017-01-01 PROCEDURE — 80048 BASIC METABOLIC PNL TOTAL CA: CPT | Performed by: INTERNAL MEDICINE

## 2017-01-01 PROCEDURE — 80053 COMPREHEN METABOLIC PANEL: CPT | Performed by: FAMILY MEDICINE

## 2017-01-01 PROCEDURE — C1898 LEAD, PMKR, OTHER THAN TRANS: HCPCS | Performed by: INTERNAL MEDICINE

## 2017-01-01 PROCEDURE — 97597 DBRDMT OPN WND 1ST 20 CM/<: CPT

## 2017-01-01 PROCEDURE — 25010000002 THIAMINE PER 100 MG: Performed by: INTERNAL MEDICINE

## 2017-01-01 PROCEDURE — 99217 PR OBSERVATION CARE DISCHARGE MANAGEMENT: CPT | Performed by: NURSE PRACTITIONER

## 2017-01-01 PROCEDURE — 82330 ASSAY OF CALCIUM: CPT | Performed by: EMERGENCY MEDICINE

## 2017-01-01 PROCEDURE — 63710000001 INSULIN LISPRO (HUMAN) PER 5 UNITS: Performed by: INTERNAL MEDICINE

## 2017-01-01 PROCEDURE — 93306 TTE W/DOPPLER COMPLETE: CPT

## 2017-01-01 PROCEDURE — 85014 HEMATOCRIT: CPT | Performed by: FAMILY MEDICINE

## 2017-01-01 PROCEDURE — 82728 ASSAY OF FERRITIN: CPT | Performed by: FAMILY MEDICINE

## 2017-01-01 PROCEDURE — 02HK3JZ INSERTION OF PACEMAKER LEAD INTO RIGHT VENTRICLE, PERCUTANEOUS APPROACH: ICD-10-PCS | Performed by: INTERNAL MEDICINE

## 2017-01-01 PROCEDURE — 76775 US EXAM ABDO BACK WALL LIM: CPT

## 2017-01-01 PROCEDURE — 93005 ELECTROCARDIOGRAM TRACING: CPT | Performed by: EMERGENCY MEDICINE

## 2017-01-01 PROCEDURE — 25010000002 HALOPERIDOL LACTATE PER 5 MG: Performed by: NURSE PRACTITIONER

## 2017-01-01 PROCEDURE — 99232 SBSQ HOSP IP/OBS MODERATE 35: CPT | Performed by: NURSE PRACTITIONER

## 2017-01-01 PROCEDURE — 84484 ASSAY OF TROPONIN QUANT: CPT | Performed by: INTERNAL MEDICINE

## 2017-01-01 PROCEDURE — 85025 COMPLETE CBC W/AUTO DIFF WBC: CPT | Performed by: NURSE PRACTITIONER

## 2017-01-01 PROCEDURE — 97602 WOUND(S) CARE NON-SELECTIVE: CPT

## 2017-01-01 PROCEDURE — 83550 IRON BINDING TEST: CPT | Performed by: FAMILY MEDICINE

## 2017-01-01 PROCEDURE — 83921 ORGANIC ACID SINGLE QUANT: CPT | Performed by: FAMILY MEDICINE

## 2017-01-01 PROCEDURE — C1785 PMKR, DUAL, RATE-RESP: HCPCS | Performed by: INTERNAL MEDICINE

## 2017-01-01 PROCEDURE — 25010000002 HYDRALAZINE PER 20 MG: Performed by: NURSE PRACTITIONER

## 2017-01-01 PROCEDURE — 87205 SMEAR GRAM STAIN: CPT | Performed by: INTERNAL MEDICINE

## 2017-01-01 PROCEDURE — 25010000002 HYDRALAZINE PER 20 MG: Performed by: EMERGENCY MEDICINE

## 2017-01-01 PROCEDURE — 83540 ASSAY OF IRON: CPT | Performed by: FAMILY MEDICINE

## 2017-01-01 PROCEDURE — 86334 IMMUNOFIX E-PHORESIS SERUM: CPT | Performed by: INTERNAL MEDICINE

## 2017-01-01 PROCEDURE — 85027 COMPLETE CBC AUTOMATED: CPT | Performed by: FAMILY MEDICINE

## 2017-01-01 PROCEDURE — 83605 ASSAY OF LACTIC ACID: CPT | Performed by: INTERNAL MEDICINE

## 2017-01-01 PROCEDURE — 82140 ASSAY OF AMMONIA: CPT | Performed by: INTERNAL MEDICINE

## 2017-01-01 PROCEDURE — 93971 EXTREMITY STUDY: CPT

## 2017-01-01 PROCEDURE — 80069 RENAL FUNCTION PANEL: CPT | Performed by: FAMILY MEDICINE

## 2017-01-01 PROCEDURE — 84484 ASSAY OF TROPONIN QUANT: CPT

## 2017-01-01 PROCEDURE — 84439 ASSAY OF FREE THYROXINE: CPT | Performed by: INTERNAL MEDICINE

## 2017-01-01 PROCEDURE — 85025 COMPLETE CBC W/AUTO DIFF WBC: CPT

## 2017-01-01 PROCEDURE — 25010000002 HYDROMORPHONE PER 4 MG: Performed by: INTERNAL MEDICINE

## 2017-01-01 PROCEDURE — 84155 ASSAY OF PROTEIN SERUM: CPT | Performed by: INTERNAL MEDICINE

## 2017-01-01 PROCEDURE — 84156 ASSAY OF PROTEIN URINE: CPT | Performed by: INTERNAL MEDICINE

## 2017-01-01 PROCEDURE — 86901 BLOOD TYPING SEROLOGIC RH(D): CPT | Performed by: FAMILY MEDICINE

## 2017-01-01 PROCEDURE — 85610 PROTHROMBIN TIME: CPT | Performed by: INTERNAL MEDICINE

## 2017-01-01 PROCEDURE — 93005 ELECTROCARDIOGRAM TRACING: CPT | Performed by: INTERNAL MEDICINE

## 2017-01-01 PROCEDURE — 87086 URINE CULTURE/COLONY COUNT: CPT | Performed by: INTERNAL MEDICINE

## 2017-01-01 PROCEDURE — 25010000002 ALBUMIN HUMAN 25% PER 50 ML: Performed by: INTERNAL MEDICINE

## 2017-01-01 PROCEDURE — 33208 INSRT HEART PM ATRIAL & VENT: CPT | Performed by: INTERNAL MEDICINE

## 2017-01-01 PROCEDURE — 25010000002 FUROSEMIDE PER 20 MG: Performed by: FAMILY MEDICINE

## 2017-01-01 PROCEDURE — 97162 PT EVAL MOD COMPLEX 30 MIN: CPT

## 2017-01-01 PROCEDURE — 82607 VITAMIN B-12: CPT | Performed by: FAMILY MEDICINE

## 2017-01-01 PROCEDURE — 85014 HEMATOCRIT: CPT

## 2017-01-01 PROCEDURE — 73060 X-RAY EXAM OF HUMERUS: CPT

## 2017-01-01 PROCEDURE — 83516 IMMUNOASSAY NONANTIBODY: CPT | Performed by: INTERNAL MEDICINE

## 2017-01-01 PROCEDURE — 99231 SBSQ HOSP IP/OBS SF/LOW 25: CPT | Performed by: NURSE PRACTITIONER

## 2017-01-01 PROCEDURE — 86850 RBC ANTIBODY SCREEN: CPT | Performed by: FAMILY MEDICINE

## 2017-01-01 PROCEDURE — 83935 ASSAY OF URINE OSMOLALITY: CPT | Performed by: INTERNAL MEDICINE

## 2017-01-01 DEVICE — GEN PM ASSURITY MRI DR RF PM2272: Type: IMPLANTABLE DEVICE | Status: FUNCTIONAL

## 2017-01-01 DEVICE — LD PM MRI TENDRIL LPA1200M46: Type: IMPLANTABLE DEVICE | Status: FUNCTIONAL

## 2017-01-01 DEVICE — LD PM MRI TENDRIL LPA1200M58: Type: IMPLANTABLE DEVICE | Status: FUNCTIONAL

## 2017-01-01 RX ORDER — FUROSEMIDE 10 MG/ML
40 INJECTION INTRAMUSCULAR; INTRAVENOUS EVERY 6 HOURS
Status: DISCONTINUED | OUTPATIENT
Start: 2017-01-01 | End: 2017-01-01

## 2017-01-01 RX ORDER — MELATONIN
2000 DAILY
COMMUNITY

## 2017-01-01 RX ORDER — HALOPERIDOL 5 MG/ML
2 INJECTION INTRAMUSCULAR EVERY 4 HOURS PRN
Status: DISCONTINUED | OUTPATIENT
Start: 2017-01-01 | End: 2017-01-01

## 2017-01-01 RX ORDER — HEPARIN SODIUM 5000 [USP'U]/ML
5000 INJECTION, SOLUTION INTRAVENOUS; SUBCUTANEOUS EVERY 12 HOURS SCHEDULED
Status: DISCONTINUED | OUTPATIENT
Start: 2017-01-01 | End: 2017-01-01

## 2017-01-01 RX ORDER — TERAZOSIN 5 MG/1
5 CAPSULE ORAL EVERY 12 HOURS SCHEDULED
Status: DISCONTINUED | OUTPATIENT
Start: 2017-01-01 | End: 2017-01-01

## 2017-01-01 RX ORDER — LACTULOSE 10 G/15ML
10 SOLUTION ORAL 2 TIMES DAILY
Status: DISCONTINUED | OUTPATIENT
Start: 2017-01-01 | End: 2017-01-01

## 2017-01-01 RX ORDER — TERAZOSIN 5 MG/1
5 CAPSULE ORAL EVERY 12 HOURS SCHEDULED
Status: DISCONTINUED | OUTPATIENT
Start: 2017-01-01 | End: 2017-01-01 | Stop reason: HOSPADM

## 2017-01-01 RX ORDER — QUETIAPINE FUMARATE 25 MG/1
25 TABLET, FILM COATED ORAL 2 TIMES DAILY PRN
Status: DISCONTINUED | OUTPATIENT
Start: 2017-01-01 | End: 2017-01-01

## 2017-01-01 RX ORDER — ONDANSETRON 2 MG/ML
4 INJECTION INTRAMUSCULAR; INTRAVENOUS EVERY 8 HOURS
Status: DISCONTINUED | OUTPATIENT
Start: 2017-01-01 | End: 2017-10-07 | Stop reason: HOSPADM

## 2017-01-01 RX ORDER — FUROSEMIDE 10 MG/ML
60 INJECTION INTRAMUSCULAR; INTRAVENOUS ONCE
Status: COMPLETED | OUTPATIENT
Start: 2017-01-01 | End: 2017-01-01

## 2017-01-01 RX ORDER — HYDRALAZINE HYDROCHLORIDE 50 MG/1
50 TABLET, FILM COATED ORAL EVERY 8 HOURS SCHEDULED
Status: DISCONTINUED | OUTPATIENT
Start: 2017-01-01 | End: 2017-01-01

## 2017-01-01 RX ORDER — HYDRALAZINE HYDROCHLORIDE 50 MG/1
50 TABLET, FILM COATED ORAL 2 TIMES DAILY
Status: DISCONTINUED | OUTPATIENT
Start: 2017-01-01 | End: 2017-01-01

## 2017-01-01 RX ORDER — AMLODIPINE BESYLATE 5 MG/1
5 TABLET ORAL
Status: DISCONTINUED | OUTPATIENT
Start: 2017-01-01 | End: 2017-01-01

## 2017-01-01 RX ORDER — CEFTRIAXONE SODIUM 1 G/50ML
1 INJECTION, SOLUTION INTRAVENOUS EVERY 24 HOURS
Status: DISCONTINUED | OUTPATIENT
Start: 2017-01-01 | End: 2017-01-01

## 2017-01-01 RX ORDER — ALBUMIN (HUMAN) 12.5 G/50ML
25 SOLUTION INTRAVENOUS EVERY 8 HOURS
Status: DISCONTINUED | OUTPATIENT
Start: 2017-01-01 | End: 2017-01-01

## 2017-01-01 RX ORDER — DEXTROSE MONOHYDRATE 50 MG/ML
100 INJECTION, SOLUTION INTRAVENOUS CONTINUOUS
Status: DISCONTINUED | OUTPATIENT
Start: 2017-01-01 | End: 2017-01-01

## 2017-01-01 RX ORDER — NITROGLYCERIN 20 MG/100ML
5-100 INJECTION INTRAVENOUS
Status: DISCONTINUED | OUTPATIENT
Start: 2017-01-01 | End: 2017-01-01

## 2017-01-01 RX ORDER — LIDOCAINE HYDROCHLORIDE 10 MG/ML
INJECTION, SOLUTION INFILTRATION; PERINEURAL AS NEEDED
Status: DISCONTINUED | OUTPATIENT
Start: 2017-01-01 | End: 2017-01-01 | Stop reason: HOSPADM

## 2017-01-01 RX ORDER — HYDRALAZINE HYDROCHLORIDE 20 MG/ML
20 INJECTION INTRAMUSCULAR; INTRAVENOUS ONCE
Status: COMPLETED | OUTPATIENT
Start: 2017-01-01 | End: 2017-01-01

## 2017-01-01 RX ORDER — MORPHINE SULFATE 4 MG/ML
1 INJECTION, SOLUTION INTRAMUSCULAR; INTRAVENOUS EVERY 4 HOURS PRN
Status: DISCONTINUED | OUTPATIENT
Start: 2017-01-01 | End: 2017-01-01

## 2017-01-01 RX ORDER — CASTOR OIL AND BALSAM, PERU 788; 87 MG/G; MG/G
OINTMENT TOPICAL EVERY 12 HOURS SCHEDULED
Status: DISCONTINUED | OUTPATIENT
Start: 2017-01-01 | End: 2017-10-07 | Stop reason: HOSPADM

## 2017-01-01 RX ORDER — SENNA AND DOCUSATE SODIUM 50; 8.6 MG/1; MG/1
2 TABLET, FILM COATED ORAL NIGHTLY
Status: DISCONTINUED | OUTPATIENT
Start: 2017-01-01 | End: 2017-01-01

## 2017-01-01 RX ORDER — THIAMINE HYDROCHLORIDE 100 MG/ML
500 INJECTION, SOLUTION INTRAMUSCULAR; INTRAVENOUS EVERY 8 HOURS SCHEDULED
Status: DISCONTINUED | OUTPATIENT
Start: 2017-01-01 | End: 2017-01-01 | Stop reason: SDUPTHER

## 2017-01-01 RX ORDER — HALOPERIDOL 5 MG/ML
1 INJECTION INTRAMUSCULAR EVERY 6 HOURS PRN
Status: DISCONTINUED | OUTPATIENT
Start: 2017-01-01 | End: 2017-01-01

## 2017-01-01 RX ORDER — FUROSEMIDE 10 MG/ML
20 INJECTION INTRAMUSCULAR; INTRAVENOUS ONCE
Status: DISCONTINUED | OUTPATIENT
Start: 2017-01-01 | End: 2017-01-01 | Stop reason: SDUPTHER

## 2017-01-01 RX ORDER — ONDANSETRON 2 MG/ML
4 INJECTION INTRAMUSCULAR; INTRAVENOUS EVERY 6 HOURS PRN
Status: CANCELLED | OUTPATIENT
Start: 2017-01-01

## 2017-01-01 RX ORDER — CHLOROTHIAZIDE SODIUM 500 MG/1
500 INJECTION INTRAVENOUS ONCE
Status: COMPLETED | OUTPATIENT
Start: 2017-01-01 | End: 2017-01-01

## 2017-01-01 RX ORDER — SODIUM CHLORIDE 0.9 % (FLUSH) 0.9 %
10 SYRINGE (ML) INJECTION AS NEEDED
Status: DISCONTINUED | OUTPATIENT
Start: 2017-01-01 | End: 2017-01-01 | Stop reason: HOSPADM

## 2017-01-01 RX ORDER — LORAZEPAM 2 MG/ML
1 INJECTION INTRAMUSCULAR
Status: DISCONTINUED | OUTPATIENT
Start: 2017-01-01 | End: 2017-01-01

## 2017-01-01 RX ORDER — LORAZEPAM 2 MG/ML
1 INJECTION INTRAMUSCULAR EVERY 4 HOURS PRN
Status: DISCONTINUED | OUTPATIENT
Start: 2017-01-01 | End: 2017-01-01

## 2017-01-01 RX ORDER — DEXTROSE MONOHYDRATE 50 MG/ML
50 INJECTION, SOLUTION INTRAVENOUS CONTINUOUS
Status: DISCONTINUED | OUTPATIENT
Start: 2017-01-01 | End: 2017-01-01

## 2017-01-01 RX ORDER — ONDANSETRON 2 MG/ML
4 INJECTION INTRAMUSCULAR; INTRAVENOUS EVERY 6 HOURS PRN
Status: DISCONTINUED | OUTPATIENT
Start: 2017-01-01 | End: 2017-10-07 | Stop reason: HOSPADM

## 2017-01-01 RX ORDER — NICOTINE POLACRILEX 4 MG
15 LOZENGE BUCCAL
Status: DISCONTINUED | OUTPATIENT
Start: 2017-01-01 | End: 2017-01-01

## 2017-01-01 RX ORDER — ATORVASTATIN CALCIUM 10 MG/1
10 TABLET, FILM COATED ORAL DAILY
Status: DISCONTINUED | OUTPATIENT
Start: 2017-01-01 | End: 2017-01-01

## 2017-01-01 RX ORDER — ATORVASTATIN CALCIUM 10 MG/1
10 TABLET, FILM COATED ORAL DAILY
Status: ON HOLD | COMMUNITY
End: 2017-01-01

## 2017-01-01 RX ORDER — POTASSIUM CHLORIDE 1.5 G/1.77G
20 POWDER, FOR SOLUTION ORAL ONCE
Status: COMPLETED | OUTPATIENT
Start: 2017-01-01 | End: 2017-01-01

## 2017-01-01 RX ORDER — LORAZEPAM 2 MG/ML
2 INJECTION INTRAMUSCULAR
Status: DISCONTINUED | OUTPATIENT
Start: 2017-01-01 | End: 2017-01-01

## 2017-01-01 RX ORDER — CARVEDILOL 12.5 MG/1
25 TABLET ORAL EVERY 12 HOURS SCHEDULED
Status: DISCONTINUED | OUTPATIENT
Start: 2017-01-01 | End: 2017-01-01

## 2017-01-01 RX ORDER — SODIUM CHLORIDE 0.9 % (FLUSH) 0.9 %
10 SYRINGE (ML) INJECTION AS NEEDED
Status: DISCONTINUED | OUTPATIENT
Start: 2017-01-01 | End: 2017-01-01

## 2017-01-01 RX ORDER — HYDROMORPHONE HYDROCHLORIDE 1 MG/ML
0.5 INJECTION, SOLUTION INTRAMUSCULAR; INTRAVENOUS; SUBCUTANEOUS
Status: DISCONTINUED | OUTPATIENT
Start: 2017-01-01 | End: 2017-10-07 | Stop reason: HOSPADM

## 2017-01-01 RX ORDER — HYDROMORPHONE HYDROCHLORIDE 1 MG/ML
0.25 INJECTION, SOLUTION INTRAMUSCULAR; INTRAVENOUS; SUBCUTANEOUS EVERY 6 HOURS PRN
Status: DISCONTINUED | OUTPATIENT
Start: 2017-01-01 | End: 2017-01-01

## 2017-01-01 RX ORDER — HALOPERIDOL 5 MG/ML
0.5 INJECTION INTRAMUSCULAR EVERY 6 HOURS PRN
Status: CANCELLED | OUTPATIENT
Start: 2017-01-01

## 2017-01-01 RX ORDER — AMLODIPINE BESYLATE 5 MG/1
5 TABLET ORAL 2 TIMES DAILY
Status: DISCONTINUED | OUTPATIENT
Start: 2017-01-01 | End: 2017-01-01

## 2017-01-01 RX ORDER — QUETIAPINE FUMARATE 25 MG/1
12.5 TABLET, FILM COATED ORAL 2 TIMES DAILY PRN
Status: DISCONTINUED | OUTPATIENT
Start: 2017-01-01 | End: 2017-01-01

## 2017-01-01 RX ORDER — HYDRALAZINE HYDROCHLORIDE 50 MG/1
50 TABLET, FILM COATED ORAL 2 TIMES DAILY
COMMUNITY

## 2017-01-01 RX ORDER — FUROSEMIDE 10 MG/ML
20 INJECTION INTRAMUSCULAR; INTRAVENOUS ONCE
Status: COMPLETED | OUTPATIENT
Start: 2017-01-01 | End: 2017-01-01

## 2017-01-01 RX ORDER — QUETIAPINE FUMARATE 25 MG/1
12.5 TABLET, FILM COATED ORAL EVERY 8 HOURS PRN
Status: CANCELLED | OUTPATIENT
Start: 2017-01-01

## 2017-01-01 RX ORDER — QUETIAPINE FUMARATE 25 MG/1
25 TABLET, FILM COATED ORAL DAILY
Status: DISCONTINUED | OUTPATIENT
Start: 2017-01-01 | End: 2017-01-01

## 2017-01-01 RX ORDER — PHENYLEPHRINE HCL IN 0.9% NACL 0.5 MG/5ML
.5-3 SYRINGE (ML) INTRAVENOUS
Status: DISCONTINUED | OUTPATIENT
Start: 2017-01-01 | End: 2017-01-01

## 2017-01-01 RX ORDER — QUETIAPINE FUMARATE 25 MG/1
12.5 TABLET, FILM COATED ORAL EVERY 8 HOURS PRN
Status: DISCONTINUED | OUTPATIENT
Start: 2017-01-01 | End: 2017-01-01 | Stop reason: HOSPADM

## 2017-01-01 RX ORDER — TERAZOSIN 5 MG/1
5 CAPSULE ORAL EVERY 12 HOURS SCHEDULED
Status: CANCELLED | OUTPATIENT
Start: 2017-01-01

## 2017-01-01 RX ORDER — MIDAZOLAM HYDROCHLORIDE 1 MG/ML
INJECTION INTRAMUSCULAR; INTRAVENOUS AS NEEDED
Status: DISCONTINUED | OUTPATIENT
Start: 2017-01-01 | End: 2017-01-01 | Stop reason: HOSPADM

## 2017-01-01 RX ORDER — SENNOSIDES 8.6 MG
2 TABLET ORAL 2 TIMES DAILY PRN
Status: DISCONTINUED | OUTPATIENT
Start: 2017-01-01 | End: 2017-10-07 | Stop reason: HOSPADM

## 2017-01-01 RX ORDER — FUROSEMIDE 20 MG/1
20 TABLET ORAL 2 TIMES DAILY
Status: DISCONTINUED | OUTPATIENT
Start: 2017-01-01 | End: 2017-01-01

## 2017-01-01 RX ORDER — BUMETANIDE 0.25 MG/ML
2.5 INJECTION INTRAMUSCULAR; INTRAVENOUS EVERY 4 HOURS
Status: COMPLETED | OUTPATIENT
Start: 2017-01-01 | End: 2017-01-01

## 2017-01-01 RX ORDER — LORAZEPAM 2 MG/ML
0.5 INJECTION INTRAMUSCULAR EVERY 4 HOURS PRN
Status: DISCONTINUED | OUTPATIENT
Start: 2017-01-01 | End: 2017-01-01

## 2017-01-01 RX ORDER — DOXYCYCLINE HYCLATE 100 MG/1
100 CAPSULE ORAL EVERY 12 HOURS
Status: DISCONTINUED | OUTPATIENT
Start: 2017-01-01 | End: 2017-01-01

## 2017-01-01 RX ORDER — CASTOR OIL AND BALSAM, PERU 788; 87 MG/G; MG/G
OINTMENT TOPICAL EVERY 12 HOURS SCHEDULED
Status: CANCELLED | OUTPATIENT
Start: 2017-01-01

## 2017-01-01 RX ORDER — HALOPERIDOL 5 MG/ML
0.5 INJECTION INTRAMUSCULAR EVERY 6 HOURS PRN
Status: DISCONTINUED | OUTPATIENT
Start: 2017-01-01 | End: 2017-01-01

## 2017-01-01 RX ORDER — FUROSEMIDE 40 MG/1
40 TABLET ORAL 2 TIMES DAILY
Status: DISCONTINUED | OUTPATIENT
Start: 2017-01-01 | End: 2017-01-01

## 2017-01-01 RX ORDER — ASPIRIN 81 MG/1
81 TABLET ORAL DAILY
COMMUNITY

## 2017-01-01 RX ORDER — DIAZEPAM 5 MG/ML
5 INJECTION, SOLUTION INTRAMUSCULAR; INTRAVENOUS EVERY 6 HOURS
Status: DISCONTINUED | OUTPATIENT
Start: 2017-01-01 | End: 2017-01-01

## 2017-01-01 RX ORDER — LACTULOSE 10 G/15ML
10 SOLUTION ORAL DAILY
Status: DISCONTINUED | OUTPATIENT
Start: 2017-01-01 | End: 2017-01-01

## 2017-01-01 RX ORDER — POLYETHYLENE GLYCOL 3350 17 G/17G
17 POWDER, FOR SOLUTION ORAL DAILY
Status: DISCONTINUED | OUTPATIENT
Start: 2017-01-01 | End: 2017-01-01 | Stop reason: HOSPADM

## 2017-01-01 RX ORDER — GLYCOPYRROLATE 0.2 MG/ML
0.2 INJECTION INTRAMUSCULAR; INTRAVENOUS EVERY 4 HOURS PRN
Status: DISCONTINUED | OUTPATIENT
Start: 2017-01-01 | End: 2017-10-07 | Stop reason: HOSPADM

## 2017-01-01 RX ORDER — DOCUSATE SODIUM 50 MG/5 ML
150 LIQUID (ML) ORAL DAILY
Status: DISCONTINUED | OUTPATIENT
Start: 2017-01-01 | End: 2017-01-01

## 2017-01-01 RX ORDER — TEMAZEPAM 7.5 MG/1
7.5 CAPSULE ORAL NIGHTLY PRN
Status: DISCONTINUED | OUTPATIENT
Start: 2017-01-01 | End: 2017-01-01

## 2017-01-01 RX ORDER — LORAZEPAM 2 MG/ML
0.5 INJECTION INTRAMUSCULAR EVERY 6 HOURS PRN
Status: DISCONTINUED | OUTPATIENT
Start: 2017-01-01 | End: 2017-01-01

## 2017-01-01 RX ORDER — AMLODIPINE BESYLATE 5 MG/1
5 TABLET ORAL 2 TIMES DAILY PRN
Status: CANCELLED | OUTPATIENT
Start: 2017-01-01

## 2017-01-01 RX ORDER — IPRATROPIUM BROMIDE AND ALBUTEROL SULFATE 2.5; .5 MG/3ML; MG/3ML
3 SOLUTION RESPIRATORY (INHALATION)
Status: DISCONTINUED | OUTPATIENT
Start: 2017-01-01 | End: 2017-01-01

## 2017-01-01 RX ORDER — SODIUM PHOSPHATE, DIBASIC AND SODIUM PHOSPHATE, MONOBASIC 7; 19 G/133ML; G/133ML
1 ENEMA RECTAL ONCE
Status: COMPLETED | OUTPATIENT
Start: 2017-01-01 | End: 2017-01-01

## 2017-01-01 RX ORDER — HALOPERIDOL 5 MG/ML
1 INJECTION INTRAMUSCULAR ONCE
Status: DISCONTINUED | OUTPATIENT
Start: 2017-01-01 | End: 2017-01-01 | Stop reason: SDUPTHER

## 2017-01-01 RX ORDER — IPRATROPIUM BROMIDE AND ALBUTEROL SULFATE 2.5; .5 MG/3ML; MG/3ML
3 SOLUTION RESPIRATORY (INHALATION) EVERY 4 HOURS PRN
Status: CANCELLED | OUTPATIENT
Start: 2017-01-01

## 2017-01-01 RX ORDER — ALBUMIN (HUMAN) 12.5 G/50ML
12.5 SOLUTION INTRAVENOUS EVERY 8 HOURS
Status: DISCONTINUED | OUTPATIENT
Start: 2017-01-01 | End: 2017-01-01

## 2017-01-01 RX ORDER — AMINO ACIDS/PROTEIN HYDROLYS 15G-100/30
1 LIQUID (ML) ORAL DAILY
Status: DISCONTINUED | OUTPATIENT
Start: 2017-01-01 | End: 2017-01-01

## 2017-01-01 RX ORDER — ALBUMIN, HUMAN INJ 5% 5 %
25 SOLUTION INTRAVENOUS 2 TIMES DAILY
Status: DISCONTINUED | OUTPATIENT
Start: 2017-01-01 | End: 2017-01-01

## 2017-01-01 RX ORDER — DOCUSATE SODIUM 100 MG/1
100 CAPSULE, LIQUID FILLED ORAL 2 TIMES DAILY
Status: DISCONTINUED | OUTPATIENT
Start: 2017-01-01 | End: 2017-01-01

## 2017-01-01 RX ORDER — VANCOMYCIN 2 GRAM/500 ML IN 0.9 % SODIUM CHLORIDE INTRAVENOUS
20 ONCE
Status: COMPLETED | OUTPATIENT
Start: 2017-01-01 | End: 2017-01-01

## 2017-01-01 RX ORDER — ENALAPRILAT 2.5 MG/2ML
1.25 INJECTION INTRAVENOUS EVERY 6 HOURS PRN
Status: DISCONTINUED | OUTPATIENT
Start: 2017-01-01 | End: 2017-01-01

## 2017-01-01 RX ORDER — DEXTROSE MONOHYDRATE 25 G/50ML
25 INJECTION, SOLUTION INTRAVENOUS
Status: DISCONTINUED | OUTPATIENT
Start: 2017-01-01 | End: 2017-01-01

## 2017-01-01 RX ORDER — AMLODIPINE BESYLATE 5 MG/1
5 TABLET ORAL 2 TIMES DAILY PRN
Status: DISCONTINUED | OUTPATIENT
Start: 2017-01-01 | End: 2017-01-01

## 2017-01-01 RX ORDER — DEXTROSE AND SODIUM CHLORIDE 5; .45 G/100ML; G/100ML
75 INJECTION, SOLUTION INTRAVENOUS CONTINUOUS
Status: DISCONTINUED | OUTPATIENT
Start: 2017-01-01 | End: 2017-01-01

## 2017-01-01 RX ORDER — MORPHINE SULFATE 2 MG/ML
2 INJECTION, SOLUTION INTRAMUSCULAR; INTRAVENOUS
Status: CANCELLED | OUTPATIENT
Start: 2017-01-01 | End: 2017-01-01

## 2017-01-01 RX ORDER — ATORVASTATIN CALCIUM 10 MG/1
10 TABLET, FILM COATED ORAL DAILY
COMMUNITY

## 2017-01-01 RX ORDER — ONDANSETRON 2 MG/ML
4 INJECTION INTRAMUSCULAR; INTRAVENOUS EVERY 6 HOURS PRN
Status: DISCONTINUED | OUTPATIENT
Start: 2017-01-01 | End: 2017-01-01 | Stop reason: HOSPADM

## 2017-01-01 RX ORDER — HALOPERIDOL 5 MG/ML
1 INJECTION INTRAMUSCULAR EVERY 4 HOURS PRN
Status: DISCONTINUED | OUTPATIENT
Start: 2017-01-01 | End: 2017-01-01

## 2017-01-01 RX ORDER — SODIUM CHLORIDE 9 MG/ML
250 INJECTION, SOLUTION INTRAVENOUS CONTINUOUS
Status: ACTIVE | OUTPATIENT
Start: 2017-01-01 | End: 2017-01-01

## 2017-01-01 RX ORDER — MORPHINE SULFATE 2 MG/ML
2 INJECTION, SOLUTION INTRAMUSCULAR; INTRAVENOUS
Status: DISCONTINUED | OUTPATIENT
Start: 2017-01-01 | End: 2017-01-01

## 2017-01-01 RX ORDER — POTASSIUM CHLORIDE, DEXTROSE MONOHYDRATE 150; 5 MG/100ML; G/100ML
75 INJECTION, SOLUTION INTRAVENOUS CONTINUOUS
Status: DISCONTINUED | OUTPATIENT
Start: 2017-01-01 | End: 2017-01-01

## 2017-01-01 RX ORDER — HEPARIN SODIUM 5000 [USP'U]/ML
5000 INJECTION, SOLUTION INTRAVENOUS; SUBCUTANEOUS EVERY 8 HOURS SCHEDULED
Status: DISCONTINUED | OUTPATIENT
Start: 2017-01-01 | End: 2017-01-01

## 2017-01-01 RX ORDER — FUROSEMIDE 20 MG/1
20 TABLET ORAL ONCE
Status: DISCONTINUED | OUTPATIENT
Start: 2017-01-01 | End: 2017-01-01

## 2017-01-01 RX ORDER — QUETIAPINE FUMARATE 25 MG/1
25 TABLET, FILM COATED ORAL EVERY 6 HOURS PRN
Status: DISCONTINUED | OUTPATIENT
Start: 2017-01-01 | End: 2017-10-07 | Stop reason: HOSPADM

## 2017-01-01 RX ORDER — POTASSIUM CHLORIDE 1.5 G/1.77G
40 POWDER, FOR SOLUTION ORAL EVERY 4 HOURS
Status: COMPLETED | OUTPATIENT
Start: 2017-01-01 | End: 2017-01-01

## 2017-01-01 RX ORDER — HYDROMORPHONE HYDROCHLORIDE 1 MG/ML
0.25 INJECTION, SOLUTION INTRAMUSCULAR; INTRAVENOUS; SUBCUTANEOUS EVERY 8 HOURS
Status: DISCONTINUED | OUTPATIENT
Start: 2017-01-01 | End: 2017-10-07 | Stop reason: HOSPADM

## 2017-01-01 RX ORDER — AMINO ACIDS/PROTEIN HYDROLYS 15G-100/30
1 LIQUID (ML) ORAL 3 TIMES DAILY
Status: DISCONTINUED | OUTPATIENT
Start: 2017-01-01 | End: 2017-01-01

## 2017-01-01 RX ORDER — TERAZOSIN 5 MG/1
5 CAPSULE ORAL NIGHTLY
Status: DISCONTINUED | OUTPATIENT
Start: 2017-01-01 | End: 2017-01-01

## 2017-01-01 RX ORDER — FUROSEMIDE 10 MG/ML
20 INJECTION INTRAMUSCULAR; INTRAVENOUS DAILY
Status: DISCONTINUED | OUTPATIENT
Start: 2017-01-01 | End: 2017-01-01

## 2017-01-01 RX ORDER — LORAZEPAM 1 MG/1
1 TABLET ORAL
Status: DISCONTINUED | OUTPATIENT
Start: 2017-01-01 | End: 2017-01-01

## 2017-01-01 RX ORDER — FUROSEMIDE 10 MG/ML
20 INJECTION INTRAMUSCULAR; INTRAVENOUS 2 TIMES DAILY
Status: DISCONTINUED | OUTPATIENT
Start: 2017-01-01 | End: 2017-01-01

## 2017-01-01 RX ORDER — HYDRALAZINE HYDROCHLORIDE 20 MG/ML
20 INJECTION INTRAMUSCULAR; INTRAVENOUS EVERY 6 HOURS PRN
Status: DISCONTINUED | OUTPATIENT
Start: 2017-01-01 | End: 2017-01-01

## 2017-01-01 RX ORDER — OXYCODONE AND ACETAMINOPHEN 10; 325 MG/1; MG/1
1 TABLET ORAL EVERY 4 HOURS PRN
Status: DISCONTINUED | OUTPATIENT
Start: 2017-01-01 | End: 2017-01-01

## 2017-01-01 RX ORDER — DEXTROSE AND SODIUM CHLORIDE 5; .45 G/100ML; G/100ML
125 INJECTION, SOLUTION INTRAVENOUS CONTINUOUS
Status: DISCONTINUED | OUTPATIENT
Start: 2017-01-01 | End: 2017-01-01

## 2017-01-01 RX ORDER — QUETIAPINE FUMARATE 25 MG/1
12.5 TABLET, FILM COATED ORAL DAILY
Status: DISCONTINUED | OUTPATIENT
Start: 2017-01-01 | End: 2017-01-01

## 2017-01-01 RX ORDER — QUETIAPINE FUMARATE 25 MG/1
25 TABLET, FILM COATED ORAL NIGHTLY
Status: DISCONTINUED | OUTPATIENT
Start: 2017-01-01 | End: 2017-01-01

## 2017-01-01 RX ORDER — HALOPERIDOL 5 MG/ML
1 INJECTION INTRAMUSCULAR EVERY 6 HOURS PRN
Status: DISCONTINUED | OUTPATIENT
Start: 2017-01-01 | End: 2017-10-07 | Stop reason: HOSPADM

## 2017-01-01 RX ORDER — MORPHINE SULFATE 2 MG/ML
2 INJECTION, SOLUTION INTRAMUSCULAR; INTRAVENOUS
Status: DISCONTINUED | OUTPATIENT
Start: 2017-01-01 | End: 2017-01-01 | Stop reason: HOSPADM

## 2017-01-01 RX ORDER — NALOXONE HCL 0.4 MG/ML
0.4 VIAL (ML) INJECTION
Status: DISCONTINUED | OUTPATIENT
Start: 2017-01-01 | End: 2017-01-01

## 2017-01-01 RX ORDER — LACTULOSE 10 G/15ML
10 SOLUTION ORAL 2 TIMES DAILY PRN
Status: DISCONTINUED | OUTPATIENT
Start: 2017-01-01 | End: 2017-01-01

## 2017-01-01 RX ORDER — HALOPERIDOL 5 MG/ML
2 INJECTION INTRAMUSCULAR EVERY 6 HOURS PRN
Status: DISCONTINUED | OUTPATIENT
Start: 2017-01-01 | End: 2017-10-07 | Stop reason: HOSPADM

## 2017-01-01 RX ORDER — LACTULOSE 10 G/15ML
10 SOLUTION ORAL 2 TIMES DAILY PRN
Status: DISCONTINUED | OUTPATIENT
Start: 2017-01-01 | End: 2017-10-07 | Stop reason: HOSPADM

## 2017-01-01 RX ORDER — CASTOR OIL AND BALSAM, PERU 788; 87 MG/G; MG/G
OINTMENT TOPICAL EVERY 12 HOURS SCHEDULED
Status: DISCONTINUED | OUTPATIENT
Start: 2017-01-01 | End: 2017-01-01

## 2017-01-01 RX ORDER — AMLODIPINE BESYLATE 5 MG/1
5 TABLET ORAL DAILY
COMMUNITY

## 2017-01-01 RX ORDER — CARVEDILOL 12.5 MG/1
25 TABLET ORAL EVERY 12 HOURS SCHEDULED
Status: CANCELLED | OUTPATIENT
Start: 2017-01-01

## 2017-01-01 RX ORDER — ACETAMINOPHEN 325 MG/1
650 TABLET ORAL EVERY 4 HOURS PRN
Status: DISCONTINUED | OUTPATIENT
Start: 2017-01-01 | End: 2017-01-01

## 2017-01-01 RX ORDER — FAMOTIDINE 10 MG/ML
20 INJECTION, SOLUTION INTRAVENOUS DAILY
Status: DISCONTINUED | OUTPATIENT
Start: 2017-01-01 | End: 2017-01-01

## 2017-01-01 RX ORDER — LORAZEPAM 0.5 MG/1
0.5 TABLET ORAL
Status: DISCONTINUED | OUTPATIENT
Start: 2017-01-01 | End: 2017-01-01

## 2017-01-01 RX ORDER — HYDRALAZINE HYDROCHLORIDE 20 MG/ML
20 INJECTION INTRAMUSCULAR; INTRAVENOUS DAILY PRN
Status: DISCONTINUED | OUTPATIENT
Start: 2017-01-01 | End: 2017-10-07 | Stop reason: HOSPADM

## 2017-01-01 RX ORDER — FENTANYL CITRATE 50 UG/ML
INJECTION, SOLUTION INTRAMUSCULAR; INTRAVENOUS AS NEEDED
Status: DISCONTINUED | OUTPATIENT
Start: 2017-01-01 | End: 2017-01-01 | Stop reason: HOSPADM

## 2017-01-01 RX ORDER — CASTOR OIL AND BALSAM, PERU 788; 87 MG/G; MG/G
OINTMENT TOPICAL EVERY 12 HOURS SCHEDULED
Status: DISCONTINUED | OUTPATIENT
Start: 2017-01-01 | End: 2017-01-01 | Stop reason: HOSPADM

## 2017-01-01 RX ORDER — BISACODYL 10 MG
10 SUPPOSITORY, RECTAL RECTAL DAILY
Status: DISCONTINUED | OUTPATIENT
Start: 2017-01-01 | End: 2017-01-01

## 2017-01-01 RX ORDER — IPRATROPIUM BROMIDE AND ALBUTEROL SULFATE 2.5; .5 MG/3ML; MG/3ML
3 SOLUTION RESPIRATORY (INHALATION) EVERY 4 HOURS PRN
Status: DISCONTINUED | OUTPATIENT
Start: 2017-01-01 | End: 2017-10-07 | Stop reason: HOSPADM

## 2017-01-01 RX ORDER — POTASSIUM CHLORIDE 1.5 G/1.77G
20 POWDER, FOR SOLUTION ORAL AS NEEDED
Status: DISCONTINUED | OUTPATIENT
Start: 2017-01-01 | End: 2017-01-01

## 2017-01-01 RX ORDER — SENNA AND DOCUSATE SODIUM 50; 8.6 MG/1; MG/1
2 TABLET, FILM COATED ORAL 2 TIMES DAILY PRN
Status: DISCONTINUED | OUTPATIENT
Start: 2017-01-01 | End: 2017-01-01

## 2017-01-01 RX ORDER — IPRATROPIUM BROMIDE AND ALBUTEROL SULFATE 2.5; .5 MG/3ML; MG/3ML
3 SOLUTION RESPIRATORY (INHALATION) EVERY 4 HOURS PRN
Status: DISCONTINUED | OUTPATIENT
Start: 2017-01-01 | End: 2017-01-01 | Stop reason: HOSPADM

## 2017-01-01 RX ORDER — LORAZEPAM 2 MG/ML
0.5 INJECTION INTRAMUSCULAR
Status: DISCONTINUED | OUTPATIENT
Start: 2017-01-01 | End: 2017-01-01

## 2017-01-01 RX ORDER — SODIUM BICARBONATE 650 MG/1
1300 TABLET ORAL 2 TIMES DAILY
Status: DISCONTINUED | OUTPATIENT
Start: 2017-01-01 | End: 2017-01-01

## 2017-01-01 RX ORDER — POLYETHYLENE GLYCOL 3350 17 G/17G
17 POWDER, FOR SOLUTION ORAL DAILY
Status: CANCELLED | OUTPATIENT
Start: 2017-01-01

## 2017-01-01 RX ORDER — FUROSEMIDE 40 MG/1
40 TABLET ORAL 2 TIMES DAILY
COMMUNITY

## 2017-01-01 RX ORDER — LORAZEPAM 2 MG/ML
2 INJECTION INTRAMUSCULAR EVERY 4 HOURS PRN
Status: DISCONTINUED | OUTPATIENT
Start: 2017-01-01 | End: 2017-01-01

## 2017-01-01 RX ORDER — POLYETHYLENE GLYCOL 3350 17 G/17G
17 POWDER, FOR SOLUTION ORAL DAILY
Status: DISCONTINUED | OUTPATIENT
Start: 2017-01-01 | End: 2017-01-01

## 2017-01-01 RX ORDER — SODIUM CHLORIDE 0.9 % (FLUSH) 0.9 %
10 SYRINGE (ML) INJECTION AS NEEDED
Status: CANCELLED | OUTPATIENT
Start: 2017-01-01

## 2017-01-01 RX ORDER — QUETIAPINE FUMARATE 25 MG/1
12.5 TABLET, FILM COATED ORAL EVERY 8 HOURS PRN
Status: DISCONTINUED | OUTPATIENT
Start: 2017-01-01 | End: 2017-01-01

## 2017-01-01 RX ORDER — VALSARTAN AND HYDROCHLOROTHIAZIDE 160; 25 MG/1; MG/1
1 TABLET ORAL DAILY
COMMUNITY

## 2017-01-01 RX ORDER — ALBUMIN (HUMAN) 12.5 G/50ML
50 SOLUTION INTRAVENOUS ONCE
Status: COMPLETED | OUTPATIENT
Start: 2017-01-01 | End: 2017-01-01

## 2017-01-01 RX ORDER — LORAZEPAM 2 MG/ML
0.5 INJECTION INTRAMUSCULAR EVERY 6 HOURS PRN
Status: CANCELLED | OUTPATIENT
Start: 2017-01-01 | End: 2017-01-01

## 2017-01-01 RX ORDER — ALBUMIN, HUMAN INJ 5% 5 %
25 SOLUTION INTRAVENOUS 3 TIMES DAILY
Status: DISCONTINUED | OUTPATIENT
Start: 2017-01-01 | End: 2017-01-01

## 2017-01-01 RX ORDER — BISACODYL 10 MG
10 SUPPOSITORY, RECTAL RECTAL DAILY PRN
Status: DISCONTINUED | OUTPATIENT
Start: 2017-01-01 | End: 2017-10-07 | Stop reason: HOSPADM

## 2017-01-01 RX ORDER — AMLODIPINE BESYLATE 5 MG/1
5 TABLET ORAL 2 TIMES DAILY PRN
Status: DISCONTINUED | OUTPATIENT
Start: 2017-01-01 | End: 2017-01-01 | Stop reason: HOSPADM

## 2017-01-01 RX ORDER — HYDROMORPHONE HYDROCHLORIDE 1 MG/ML
0.25 INJECTION, SOLUTION INTRAMUSCULAR; INTRAVENOUS; SUBCUTANEOUS
Status: DISCONTINUED | OUTPATIENT
Start: 2017-01-01 | End: 2017-01-01

## 2017-01-01 RX ORDER — TERAZOSIN 2 MG/1
2 CAPSULE ORAL EVERY 12 HOURS SCHEDULED
Status: DISCONTINUED | OUTPATIENT
Start: 2017-01-01 | End: 2017-01-01

## 2017-01-01 RX ORDER — CARVEDILOL 12.5 MG/1
25 TABLET ORAL EVERY 12 HOURS SCHEDULED
Status: DISCONTINUED | OUTPATIENT
Start: 2017-01-01 | End: 2017-01-01 | Stop reason: HOSPADM

## 2017-01-01 RX ORDER — LORAZEPAM 1 MG/1
2 TABLET ORAL
Status: DISCONTINUED | OUTPATIENT
Start: 2017-01-01 | End: 2017-01-01

## 2017-01-01 RX ORDER — FUROSEMIDE 10 MG/ML
60 INJECTION INTRAMUSCULAR; INTRAVENOUS EVERY 6 HOURS
Status: COMPLETED | OUTPATIENT
Start: 2017-01-01 | End: 2017-01-01

## 2017-01-01 RX ORDER — HALOPERIDOL 5 MG/ML
0.5 INJECTION INTRAMUSCULAR EVERY 6 HOURS PRN
Status: DISCONTINUED | OUTPATIENT
Start: 2017-01-01 | End: 2017-01-01 | Stop reason: HOSPADM

## 2017-01-01 RX ORDER — HALOPERIDOL 5 MG/ML
1 INJECTION INTRAMUSCULAR ONCE
Status: COMPLETED | OUTPATIENT
Start: 2017-01-01 | End: 2017-01-01

## 2017-01-01 RX ORDER — HYDROCODONE BITARTRATE AND ACETAMINOPHEN 5; 325 MG/1; MG/1
2 TABLET ORAL EVERY 4 HOURS PRN
Status: DISPENSED | OUTPATIENT
Start: 2017-01-01 | End: 2017-01-01

## 2017-01-01 RX ORDER — LORAZEPAM 2 MG/ML
0.5 INJECTION INTRAMUSCULAR EVERY 6 HOURS PRN
Status: DISCONTINUED | OUTPATIENT
Start: 2017-01-01 | End: 2017-01-01 | Stop reason: HOSPADM

## 2017-01-01 RX ORDER — HALOPERIDOL 5 MG/ML
5 INJECTION INTRAMUSCULAR EVERY 6 HOURS PRN
Status: DISCONTINUED | OUTPATIENT
Start: 2017-01-01 | End: 2017-01-01

## 2017-01-01 RX ADMIN — QUETIAPINE FUMARATE 25 MG: 25 TABLET ORAL at 08:55

## 2017-01-01 RX ADMIN — TERAZOSIN HYDROCHLORIDE ANHYDROUS 5 MG: 5 CAPSULE ORAL at 21:30

## 2017-01-01 RX ADMIN — DEXTROSE MONOHYDRATE 100 ML/HR: 50 INJECTION, SOLUTION INTRAVENOUS at 11:35

## 2017-01-01 RX ADMIN — TERAZOSIN HYDROCHLORIDE ANHYDROUS 2 MG: 2 CAPSULE ORAL at 22:17

## 2017-01-01 RX ADMIN — HALOPERIDOL LACTATE 1 MG: 5 INJECTION, SOLUTION INTRAMUSCULAR at 22:36

## 2017-01-01 RX ADMIN — DOXYCYCLINE 100 MG: 100 INJECTION, POWDER, LYOPHILIZED, FOR SOLUTION INTRAVENOUS at 05:02

## 2017-01-01 RX ADMIN — AMLODIPINE BESYLATE 5 MG: 5 TABLET ORAL at 09:35

## 2017-01-01 RX ADMIN — MICONAZOLE NITRATE: 2 CREAM TOPICAL at 08:17

## 2017-01-01 RX ADMIN — CARVEDILOL 25 MG: 12.5 TABLET, FILM COATED ORAL at 21:33

## 2017-01-01 RX ADMIN — MICONAZOLE NITRATE: 20 POWDER TOPICAL at 22:16

## 2017-01-01 RX ADMIN — QUETIAPINE FUMARATE 12.5 MG: 25 TABLET, FILM COATED ORAL at 11:41

## 2017-01-01 RX ADMIN — CARVEDILOL 25 MG: 12.5 TABLET, FILM COATED ORAL at 22:25

## 2017-01-01 RX ADMIN — CASTOR OIL AND BALSAM, PERU: 788; 87 OINTMENT TOPICAL at 21:14

## 2017-01-01 RX ADMIN — MICONAZOLE NITRATE: 2 CREAM TOPICAL at 09:50

## 2017-01-01 RX ADMIN — TERAZOSIN HYDROCHLORIDE ANHYDROUS 2 MG: 2 CAPSULE ORAL at 09:21

## 2017-01-01 RX ADMIN — TERAZOSIN HYDROCHLORIDE ANHYDROUS 2 MG: 2 CAPSULE ORAL at 09:04

## 2017-01-01 RX ADMIN — DOCUSATE SODIUM 150 MG: 50 LIQUID ORAL at 10:35

## 2017-01-01 RX ADMIN — HYDROMORPHONE HYDROCHLORIDE 0.25 MG: 1 INJECTION, SOLUTION INTRAMUSCULAR; INTRAVENOUS; SUBCUTANEOUS at 11:37

## 2017-01-01 RX ADMIN — POLYETHYLENE GLYCOL 3350 17 G: 17 POWDER, FOR SOLUTION ORAL at 09:54

## 2017-01-01 RX ADMIN — POLYETHYLENE GLYCOL 3350 17 G: 17 POWDER, FOR SOLUTION ORAL at 09:17

## 2017-01-01 RX ADMIN — CARVEDILOL 25 MG: 12.5 TABLET, FILM COATED ORAL at 08:23

## 2017-01-01 RX ADMIN — FUROSEMIDE 20 MG: 20 TABLET ORAL at 17:28

## 2017-01-01 RX ADMIN — CARVEDILOL 25 MG: 12.5 TABLET, FILM COATED ORAL at 10:18

## 2017-01-01 RX ADMIN — MICONAZOLE NITRATE: 2 CREAM TOPICAL at 22:15

## 2017-01-01 RX ADMIN — Medication 2 TABLET: at 21:03

## 2017-01-01 RX ADMIN — MICONAZOLE NITRATE 1 APPLICATION: 20 POWDER TOPICAL at 21:20

## 2017-01-01 RX ADMIN — LORAZEPAM 2 MG: 2 INJECTION INTRAMUSCULAR; INTRAVENOUS at 01:53

## 2017-01-01 RX ADMIN — HEPARIN SODIUM 5000 UNITS: 5000 INJECTION, SOLUTION INTRAVENOUS; SUBCUTANEOUS at 10:33

## 2017-01-01 RX ADMIN — QUETIAPINE FUMARATE 25 MG: 25 TABLET, FILM COATED ORAL at 20:38

## 2017-01-01 RX ADMIN — DOXYCYCLINE 100 MG: 100 INJECTION, POWDER, LYOPHILIZED, FOR SOLUTION INTRAVENOUS at 08:55

## 2017-01-01 RX ADMIN — MICONAZOLE NITRATE: 2 CREAM TOPICAL at 22:49

## 2017-01-01 RX ADMIN — DOXYCYCLINE 100 MG: 100 INJECTION, POWDER, LYOPHILIZED, FOR SOLUTION INTRAVENOUS at 17:15

## 2017-01-01 RX ADMIN — CASTOR OIL AND BALSAM, PERU: 788; 87 OINTMENT TOPICAL at 15:08

## 2017-01-01 RX ADMIN — CARVEDILOL 25 MG: 12.5 TABLET, FILM COATED ORAL at 21:29

## 2017-01-01 RX ADMIN — CARVEDILOL 25 MG: 12.5 TABLET, FILM COATED ORAL at 21:24

## 2017-01-01 RX ADMIN — CARVEDILOL 25 MG: 12.5 TABLET, FILM COATED ORAL at 09:45

## 2017-01-01 RX ADMIN — TERAZOSIN HYDROCHLORIDE ANHYDROUS 5 MG: 5 CAPSULE ORAL at 08:05

## 2017-01-01 RX ADMIN — TERAZOSIN HYDROCHLORIDE ANHYDROUS 2 MG: 2 CAPSULE ORAL at 20:09

## 2017-01-01 RX ADMIN — Medication 2 TABLET: at 21:55

## 2017-01-01 RX ADMIN — IPRATROPIUM BROMIDE AND ALBUTEROL SULFATE 3 ML: .5; 3 SOLUTION RESPIRATORY (INHALATION) at 15:27

## 2017-01-01 RX ADMIN — MICONAZOLE NITRATE: 20 POWDER TOPICAL at 08:50

## 2017-01-01 RX ADMIN — QUETIAPINE FUMARATE 25 MG: 25 TABLET, FILM COATED ORAL at 20:49

## 2017-01-01 RX ADMIN — QUETIAPINE FUMARATE 25 MG: 25 TABLET, FILM COATED ORAL at 20:45

## 2017-01-01 RX ADMIN — MICONAZOLE NITRATE: 2 CREAM TOPICAL at 21:38

## 2017-01-01 RX ADMIN — NICARDIPINE HYDROCHLORIDE 2.5 MG/HR: 25 INJECTION INTRAVENOUS at 05:13

## 2017-01-01 RX ADMIN — HYDRALAZINE HYDROCHLORIDE 50 MG: 50 TABLET ORAL at 21:39

## 2017-01-01 RX ADMIN — POLYETHYLENE GLYCOL 3350 17 G: 17 POWDER, FOR SOLUTION ORAL at 10:10

## 2017-01-01 RX ADMIN — QUETIAPINE FUMARATE 12.5 MG: 25 TABLET, FILM COATED ORAL at 10:32

## 2017-01-01 RX ADMIN — HYDROMORPHONE HYDROCHLORIDE 0.25 MG: 1 INJECTION, SOLUTION INTRAMUSCULAR; INTRAVENOUS; SUBCUTANEOUS at 15:08

## 2017-01-01 RX ADMIN — IPRATROPIUM BROMIDE AND ALBUTEROL SULFATE 3 ML: .5; 3 SOLUTION RESPIRATORY (INHALATION) at 12:50

## 2017-01-01 RX ADMIN — AMLODIPINE BESYLATE 5 MG: 5 TABLET ORAL at 09:11

## 2017-01-01 RX ADMIN — QUETIAPINE FUMARATE 12.5 MG: 25 TABLET, FILM COATED ORAL at 22:36

## 2017-01-01 RX ADMIN — IPRATROPIUM BROMIDE AND ALBUTEROL SULFATE 3 ML: .5; 3 SOLUTION RESPIRATORY (INHALATION) at 11:09

## 2017-01-01 RX ADMIN — THIAMINE HYDROCHLORIDE 100 MG: 100 INJECTION, SOLUTION INTRAMUSCULAR; INTRAVENOUS at 08:11

## 2017-01-01 RX ADMIN — QUETIAPINE FUMARATE 25 MG: 25 TABLET ORAL at 10:11

## 2017-01-01 RX ADMIN — LACTULOSE 10 G: 10 SOLUTION ORAL at 08:55

## 2017-01-01 RX ADMIN — POTASSIUM CHLORIDE 20 MEQ: 1.5 POWDER, FOR SOLUTION ORAL at 06:28

## 2017-01-01 RX ADMIN — AMLODIPINE BESYLATE 5 MG: 5 TABLET ORAL at 09:54

## 2017-01-01 RX ADMIN — CARVEDILOL 25 MG: 12.5 TABLET, FILM COATED ORAL at 08:48

## 2017-01-01 RX ADMIN — HYDROMORPHONE HYDROCHLORIDE 0.5 MG: 1 INJECTION, SOLUTION INTRAMUSCULAR; INTRAVENOUS; SUBCUTANEOUS at 17:20

## 2017-01-01 RX ADMIN — CASTOR OIL AND BALSAM, PERU: 788; 87 OINTMENT TOPICAL at 21:57

## 2017-01-01 RX ADMIN — HEPARIN SODIUM 5000 UNITS: 5000 INJECTION, SOLUTION INTRAVENOUS; SUBCUTANEOUS at 08:48

## 2017-01-01 RX ADMIN — Medication 1 PACKET: at 18:36

## 2017-01-01 RX ADMIN — Medication: at 09:25

## 2017-01-01 RX ADMIN — Medication: at 11:47

## 2017-01-01 RX ADMIN — TERAZOSIN HYDROCHLORIDE ANHYDROUS 5 MG: 5 CAPSULE ORAL at 21:33

## 2017-01-01 RX ADMIN — FUROSEMIDE 20 MG: 10 INJECTION, SOLUTION INTRAMUSCULAR; INTRAVENOUS at 08:23

## 2017-01-01 RX ADMIN — MICONAZOLE NITRATE: 2 CREAM TOPICAL at 09:18

## 2017-01-01 RX ADMIN — Medication 2 TABLET: at 20:49

## 2017-01-01 RX ADMIN — ALBUTEROL SULFATE 2.5 MG: 2.5 SOLUTION RESPIRATORY (INHALATION) at 12:57

## 2017-01-01 RX ADMIN — ATORVASTATIN CALCIUM 10 MG: 10 TABLET, FILM COATED ORAL at 20:51

## 2017-01-01 RX ADMIN — HYDRALAZINE HYDROCHLORIDE 50 MG: 50 TABLET ORAL at 13:59

## 2017-01-01 RX ADMIN — CARVEDILOL 25 MG: 12.5 TABLET, FILM COATED ORAL at 09:54

## 2017-01-01 RX ADMIN — CASTOR OIL AND BALSAM, PERU: 788; 87 OINTMENT TOPICAL at 08:50

## 2017-01-01 RX ADMIN — Medication: at 14:46

## 2017-01-01 RX ADMIN — HYDRALAZINE HYDROCHLORIDE 50 MG: 50 TABLET ORAL at 13:44

## 2017-01-01 RX ADMIN — DOXYCYCLINE 100 MG: 100 INJECTION, POWDER, LYOPHILIZED, FOR SOLUTION INTRAVENOUS at 05:16

## 2017-01-01 RX ADMIN — TERAZOSIN HYDROCHLORIDE ANHYDROUS 5 MG: 5 CAPSULE ORAL at 21:18

## 2017-01-01 RX ADMIN — HALOPERIDOL LACTATE 2 MG: 5 INJECTION, SOLUTION INTRAMUSCULAR at 10:52

## 2017-01-01 RX ADMIN — LACTULOSE 10 G: 10 SOLUTION ORAL at 13:58

## 2017-01-01 RX ADMIN — TERAZOSIN HYDROCHLORIDE ANHYDROUS 5 MG: 5 CAPSULE ORAL at 08:40

## 2017-01-01 RX ADMIN — HYDRALAZINE HYDROCHLORIDE 50 MG: 50 TABLET ORAL at 05:18

## 2017-01-01 RX ADMIN — POTASSIUM CHLORIDE AND DEXTROSE MONOHYDRATE 75 ML/HR: 150; 5 INJECTION, SOLUTION INTRAVENOUS at 02:21

## 2017-01-01 RX ADMIN — CARVEDILOL 25 MG: 12.5 TABLET, FILM COATED ORAL at 12:35

## 2017-01-01 RX ADMIN — FUROSEMIDE 20 MG: 10 INJECTION, SOLUTION INTRAMUSCULAR; INTRAVENOUS at 17:25

## 2017-01-01 RX ADMIN — THIAMINE HYDROCHLORIDE 500 MG: 100 INJECTION, SOLUTION INTRAMUSCULAR; INTRAVENOUS at 21:07

## 2017-01-01 RX ADMIN — Medication: at 21:54

## 2017-01-01 RX ADMIN — TERAZOSIN HYDROCHLORIDE ANHYDROUS 5 MG: 5 CAPSULE ORAL at 20:46

## 2017-01-01 RX ADMIN — HEPARIN SODIUM 5000 UNITS: 5000 INJECTION, SOLUTION INTRAVENOUS; SUBCUTANEOUS at 21:30

## 2017-01-01 RX ADMIN — CARVEDILOL 25 MG: 12.5 TABLET, FILM COATED ORAL at 08:06

## 2017-01-01 RX ADMIN — CASTOR OIL AND BALSAM, PERU: 788; 87 OINTMENT TOPICAL at 08:17

## 2017-01-01 RX ADMIN — FOLIC ACID 1 MG: 5 INJECTION, SOLUTION INTRAMUSCULAR; INTRAVENOUS; SUBCUTANEOUS at 08:45

## 2017-01-01 RX ADMIN — FUROSEMIDE 60 MG: 10 INJECTION, SOLUTION INTRAMUSCULAR; INTRAVENOUS at 10:00

## 2017-01-01 RX ADMIN — CASTOR OIL AND BALSAM, PERU: 788; 87 OINTMENT TOPICAL at 08:07

## 2017-01-01 RX ADMIN — CARVEDILOL 25 MG: 12.5 TABLET, FILM COATED ORAL at 08:45

## 2017-01-01 RX ADMIN — HYDRALAZINE HYDROCHLORIDE 50 MG: 50 TABLET ORAL at 22:02

## 2017-01-01 RX ADMIN — QUETIAPINE FUMARATE 25 MG: 25 TABLET ORAL at 21:14

## 2017-01-01 RX ADMIN — QUETIAPINE FUMARATE 25 MG: 25 TABLET, FILM COATED ORAL at 21:19

## 2017-01-01 RX ADMIN — HEPARIN SODIUM 5000 UNITS: 5000 INJECTION, SOLUTION INTRAVENOUS; SUBCUTANEOUS at 05:26

## 2017-01-01 RX ADMIN — HYDRALAZINE HYDROCHLORIDE 75 MG: 25 TABLET ORAL at 20:44

## 2017-01-01 RX ADMIN — DOXYCYCLINE 100 MG: 100 INJECTION, POWDER, LYOPHILIZED, FOR SOLUTION INTRAVENOUS at 08:30

## 2017-01-01 RX ADMIN — MICONAZOLE NITRATE: 20 POWDER TOPICAL at 21:30

## 2017-01-01 RX ADMIN — HYDRALAZINE HYDROCHLORIDE 50 MG: 50 TABLET ORAL at 22:45

## 2017-01-01 RX ADMIN — HALOPERIDOL LACTATE 5 MG: 5 INJECTION, SOLUTION INTRAMUSCULAR at 20:48

## 2017-01-01 RX ADMIN — MICONAZOLE NITRATE: 2 CREAM TOPICAL at 21:06

## 2017-01-01 RX ADMIN — HYDRALAZINE HYDROCHLORIDE 75 MG: 25 TABLET ORAL at 16:24

## 2017-01-01 RX ADMIN — POLYETHYLENE GLYCOL 3350 17 G: 17 POWDER, FOR SOLUTION ORAL at 08:18

## 2017-01-01 RX ADMIN — HYDRALAZINE HYDROCHLORIDE 50 MG: 50 TABLET ORAL at 21:04

## 2017-01-01 RX ADMIN — Medication 2 TABLET: at 22:23

## 2017-01-01 RX ADMIN — THIAMINE HYDROCHLORIDE 100 MG: 100 INJECTION, SOLUTION INTRAMUSCULAR; INTRAVENOUS at 09:37

## 2017-01-01 RX ADMIN — HALOPERIDOL LACTATE 2 MG: 5 INJECTION, SOLUTION INTRAMUSCULAR at 22:56

## 2017-01-01 RX ADMIN — HEPARIN SODIUM 5000 UNITS: 5000 INJECTION, SOLUTION INTRAVENOUS; SUBCUTANEOUS at 21:07

## 2017-01-01 RX ADMIN — MICONAZOLE NITRATE: 2 CREAM TOPICAL at 09:01

## 2017-01-01 RX ADMIN — FUROSEMIDE 20 MG: 20 TABLET ORAL at 08:39

## 2017-01-01 RX ADMIN — TERAZOSIN HYDROCHLORIDE ANHYDROUS 5 MG: 5 CAPSULE ORAL at 23:58

## 2017-01-01 RX ADMIN — HYDRALAZINE HYDROCHLORIDE 50 MG: 50 TABLET ORAL at 05:40

## 2017-01-01 RX ADMIN — MICONAZOLE NITRATE: 2 CREAM TOPICAL at 08:45

## 2017-01-01 RX ADMIN — LORAZEPAM 0.5 MG: 2 INJECTION INTRAMUSCULAR; INTRAVENOUS at 17:39

## 2017-01-01 RX ADMIN — CEFTRIAXONE SODIUM 1 G: 1 INJECTION, SOLUTION INTRAVENOUS at 20:10

## 2017-01-01 RX ADMIN — IPRATROPIUM BROMIDE AND ALBUTEROL SULFATE 3 ML: .5; 3 SOLUTION RESPIRATORY (INHALATION) at 15:49

## 2017-01-01 RX ADMIN — MICONAZOLE NITRATE: 2 CREAM TOPICAL at 08:07

## 2017-01-01 RX ADMIN — HYDRALAZINE HYDROCHLORIDE 75 MG: 25 TABLET ORAL at 05:55

## 2017-01-01 RX ADMIN — TERAZOSIN HYDROCHLORIDE ANHYDROUS 5 MG: 5 CAPSULE ORAL at 08:25

## 2017-01-01 RX ADMIN — ATORVASTATIN CALCIUM 10 MG: 10 TABLET, FILM COATED ORAL at 21:28

## 2017-01-01 RX ADMIN — ATORVASTATIN CALCIUM 10 MG: 10 TABLET, FILM COATED ORAL at 21:02

## 2017-01-01 RX ADMIN — Medication 1 PACKET: at 22:17

## 2017-01-01 RX ADMIN — Medication: at 12:00

## 2017-01-01 RX ADMIN — ALBUMIN HUMAN 25 G: 0.05 INJECTION, SOLUTION INTRAVENOUS at 12:22

## 2017-01-01 RX ADMIN — DOXYCYCLINE HYCLATE 100 MG: 100 CAPSULE ORAL at 17:59

## 2017-01-01 RX ADMIN — LACTULOSE 10 G: 10 SOLUTION ORAL at 09:24

## 2017-01-01 RX ADMIN — MICONAZOLE NITRATE: 20 POWDER TOPICAL at 22:15

## 2017-01-01 RX ADMIN — LACTULOSE 10 G: 10 SOLUTION ORAL at 08:22

## 2017-01-01 RX ADMIN — DOXYCYCLINE 100 MG: 100 INJECTION, POWDER, LYOPHILIZED, FOR SOLUTION INTRAVENOUS at 09:54

## 2017-01-01 RX ADMIN — POLYETHYLENE GLYCOL 3350 17 G: 17 POWDER, FOR SOLUTION ORAL at 08:11

## 2017-01-01 RX ADMIN — MICONAZOLE NITRATE: 2 CREAM TOPICAL at 20:51

## 2017-01-01 RX ADMIN — HEPARIN SODIUM 5000 UNITS: 5000 INJECTION, SOLUTION INTRAVENOUS; SUBCUTANEOUS at 20:19

## 2017-01-01 RX ADMIN — QUETIAPINE FUMARATE 12.5 MG: 25 TABLET, FILM COATED ORAL at 13:33

## 2017-01-01 RX ADMIN — TERAZOSIN HYDROCHLORIDE ANHYDROUS 5 MG: 5 CAPSULE ORAL at 08:45

## 2017-01-01 RX ADMIN — LACTULOSE 10 G: 10 SOLUTION ORAL at 10:11

## 2017-01-01 RX ADMIN — LACTULOSE 10 G: 10 SOLUTION ORAL at 08:34

## 2017-01-01 RX ADMIN — HYDRALAZINE HYDROCHLORIDE 75 MG: 25 TABLET ORAL at 15:28

## 2017-01-01 RX ADMIN — ATORVASTATIN CALCIUM 10 MG: 10 TABLET, FILM COATED ORAL at 21:03

## 2017-01-01 RX ADMIN — CARVEDILOL 25 MG: 12.5 TABLET, FILM COATED ORAL at 14:46

## 2017-01-01 RX ADMIN — TERAZOSIN HYDROCHLORIDE ANHYDROUS 5 MG: 5 CAPSULE ORAL at 09:00

## 2017-01-01 RX ADMIN — SODIUM BICARBONATE 650 MG TABLET 1300 MG: at 08:55

## 2017-01-01 RX ADMIN — ATORVASTATIN CALCIUM 10 MG: 10 TABLET, FILM COATED ORAL at 20:40

## 2017-01-01 RX ADMIN — Medication: at 19:00

## 2017-01-01 RX ADMIN — ALBUTEROL SULFATE 2.5 MG: 2.5 SOLUTION RESPIRATORY (INHALATION) at 00:40

## 2017-01-01 RX ADMIN — HYDRALAZINE HYDROCHLORIDE 75 MG: 25 TABLET ORAL at 06:00

## 2017-01-01 RX ADMIN — HEPARIN SODIUM 5000 UNITS: 5000 INJECTION, SOLUTION INTRAVENOUS; SUBCUTANEOUS at 09:53

## 2017-01-01 RX ADMIN — CARVEDILOL 25 MG: 12.5 TABLET, FILM COATED ORAL at 08:33

## 2017-01-01 RX ADMIN — FUROSEMIDE 20 MG: 10 INJECTION, SOLUTION INTRAMUSCULAR; INTRAVENOUS at 09:00

## 2017-01-01 RX ADMIN — QUETIAPINE FUMARATE 25 MG: 25 TABLET, FILM COATED ORAL at 21:03

## 2017-01-01 RX ADMIN — Medication: at 11:41

## 2017-01-01 RX ADMIN — AMLODIPINE BESYLATE 5 MG: 5 TABLET ORAL at 09:08

## 2017-01-01 RX ADMIN — CASTOR OIL AND BALSAM, PERU: 788; 87 OINTMENT TOPICAL at 09:42

## 2017-01-01 RX ADMIN — LORAZEPAM 2 MG: 2 INJECTION INTRAMUSCULAR; INTRAVENOUS at 21:20

## 2017-01-01 RX ADMIN — ALBUMIN HUMAN 12.5 G: 0.25 SOLUTION INTRAVENOUS at 09:09

## 2017-01-01 RX ADMIN — CARVEDILOL 25 MG: 12.5 TABLET, FILM COATED ORAL at 21:54

## 2017-01-01 RX ADMIN — DOXYCYCLINE 100 MG: 100 INJECTION, POWDER, LYOPHILIZED, FOR SOLUTION INTRAVENOUS at 20:35

## 2017-01-01 RX ADMIN — CARVEDILOL 25 MG: 12.5 TABLET, FILM COATED ORAL at 09:31

## 2017-01-01 RX ADMIN — LACTULOSE 10 G: 10 SOLUTION ORAL at 18:00

## 2017-01-01 RX ADMIN — AMLODIPINE BESYLATE 5 MG: 5 TABLET ORAL at 18:51

## 2017-01-01 RX ADMIN — AMLODIPINE BESYLATE 5 MG: 5 TABLET ORAL at 17:59

## 2017-01-01 RX ADMIN — HYDRALAZINE HYDROCHLORIDE 20 MG: 20 INJECTION INTRAMUSCULAR; INTRAVENOUS at 02:31

## 2017-01-01 RX ADMIN — HEPARIN SODIUM 5000 UNITS: 5000 INJECTION, SOLUTION INTRAVENOUS; SUBCUTANEOUS at 09:11

## 2017-01-01 RX ADMIN — CARVEDILOL 25 MG: 12.5 TABLET, FILM COATED ORAL at 20:51

## 2017-01-01 RX ADMIN — HEPARIN SODIUM 5000 UNITS: 5000 INJECTION, SOLUTION INTRAVENOUS; SUBCUTANEOUS at 20:41

## 2017-01-01 RX ADMIN — TERAZOSIN HYDROCHLORIDE ANHYDROUS 5 MG: 5 CAPSULE ORAL at 09:27

## 2017-01-01 RX ADMIN — LACTULOSE 10 G: 10 SOLUTION ORAL at 08:44

## 2017-01-01 RX ADMIN — MICONAZOLE NITRATE: 2 CREAM TOPICAL at 20:02

## 2017-01-01 RX ADMIN — HYDRALAZINE HYDROCHLORIDE 75 MG: 25 TABLET ORAL at 05:29

## 2017-01-01 RX ADMIN — MICONAZOLE NITRATE: 20 POWDER TOPICAL at 20:21

## 2017-01-01 RX ADMIN — CARVEDILOL 25 MG: 12.5 TABLET, FILM COATED ORAL at 09:24

## 2017-01-01 RX ADMIN — INSULIN HUMAN 2 UNITS: 100 INJECTION, SOLUTION PARENTERAL at 18:15

## 2017-01-01 RX ADMIN — DOCUSATE SODIUM 100 MG: 100 CAPSULE, LIQUID FILLED ORAL at 08:23

## 2017-01-01 RX ADMIN — HYDRALAZINE HYDROCHLORIDE 50 MG: 50 TABLET ORAL at 05:35

## 2017-01-01 RX ADMIN — HYDRALAZINE HYDROCHLORIDE 50 MG: 50 TABLET ORAL at 06:14

## 2017-01-01 RX ADMIN — HYDRALAZINE HYDROCHLORIDE 75 MG: 25 TABLET ORAL at 14:37

## 2017-01-01 RX ADMIN — QUETIAPINE FUMARATE 25 MG: 25 TABLET, FILM COATED ORAL at 21:11

## 2017-01-01 RX ADMIN — IPRATROPIUM BROMIDE AND ALBUTEROL SULFATE 3 ML: .5; 3 SOLUTION RESPIRATORY (INHALATION) at 16:11

## 2017-01-01 RX ADMIN — HALOPERIDOL LACTATE 5 MG: 5 INJECTION, SOLUTION INTRAMUSCULAR at 03:17

## 2017-01-01 RX ADMIN — THIAMINE HYDROCHLORIDE 500 MG: 100 INJECTION, SOLUTION INTRAMUSCULAR; INTRAVENOUS at 05:35

## 2017-01-01 RX ADMIN — CASTOR OIL AND BALSAM, PERU: 788; 87 OINTMENT TOPICAL at 20:17

## 2017-01-01 RX ADMIN — AMLODIPINE BESYLATE 5 MG: 5 TABLET ORAL at 08:18

## 2017-01-01 RX ADMIN — Medication 10 ML: at 20:56

## 2017-01-01 RX ADMIN — MICONAZOLE NITRATE: 20 POWDER TOPICAL at 09:38

## 2017-01-01 RX ADMIN — HEPARIN SODIUM 5000 UNITS: 5000 INJECTION, SOLUTION INTRAVENOUS; SUBCUTANEOUS at 21:27

## 2017-01-01 RX ADMIN — FAMOTIDINE 20 MG: 10 INJECTION, SOLUTION INTRAVENOUS at 08:44

## 2017-01-01 RX ADMIN — MICONAZOLE NITRATE: 20 POWDER TOPICAL at 08:17

## 2017-01-01 RX ADMIN — HYDRALAZINE HYDROCHLORIDE 50 MG: 50 TABLET ORAL at 14:58

## 2017-01-01 RX ADMIN — MICONAZOLE NITRATE 1 APPLICATION: 2 CREAM TOPICAL at 21:19

## 2017-01-01 RX ADMIN — IPRATROPIUM BROMIDE AND ALBUTEROL SULFATE 3 ML: .5; 3 SOLUTION RESPIRATORY (INHALATION) at 13:00

## 2017-01-01 RX ADMIN — ROBINUL 0.2 MG: 0.2 INJECTION INTRAMUSCULAR; INTRAVENOUS at 06:39

## 2017-01-01 RX ADMIN — Medication: at 21:24

## 2017-01-01 RX ADMIN — IPRATROPIUM BROMIDE AND ALBUTEROL SULFATE 3 ML: .5; 3 SOLUTION RESPIRATORY (INHALATION) at 19:56

## 2017-01-01 RX ADMIN — LORAZEPAM 1 MG: 2 INJECTION INTRAMUSCULAR; INTRAVENOUS at 21:45

## 2017-01-01 RX ADMIN — CARVEDILOL 25 MG: 12.5 TABLET, FILM COATED ORAL at 20:35

## 2017-01-01 RX ADMIN — HYDRALAZINE HYDROCHLORIDE 50 MG: 50 TABLET ORAL at 15:15

## 2017-01-01 RX ADMIN — IPRATROPIUM BROMIDE AND ALBUTEROL SULFATE 3 ML: .5; 3 SOLUTION RESPIRATORY (INHALATION) at 15:33

## 2017-01-01 RX ADMIN — MICONAZOLE NITRATE: 2 CREAM TOPICAL at 08:50

## 2017-01-01 RX ADMIN — ROBINUL 0.2 MG: 0.2 INJECTION INTRAMUSCULAR; INTRAVENOUS at 02:15

## 2017-01-01 RX ADMIN — FOLIC ACID 1 MG: 5 INJECTION, SOLUTION INTRAMUSCULAR; INTRAVENOUS; SUBCUTANEOUS at 10:06

## 2017-01-01 RX ADMIN — Medication 1 PACKET: at 21:14

## 2017-01-01 RX ADMIN — TERAZOSIN HYDROCHLORIDE ANHYDROUS 5 MG: 5 CAPSULE ORAL at 21:55

## 2017-01-01 RX ADMIN — HEPARIN SODIUM 5000 UNITS: 5000 INJECTION, SOLUTION INTRAVENOUS; SUBCUTANEOUS at 21:31

## 2017-01-01 RX ADMIN — TERAZOSIN HYDROCHLORIDE ANHYDROUS 5 MG: 5 CAPSULE ORAL at 09:23

## 2017-01-01 RX ADMIN — NICARDIPINE HYDROCHLORIDE 5 MG/HR: 25 INJECTION INTRAVENOUS at 23:18

## 2017-01-01 RX ADMIN — AMLODIPINE BESYLATE 5 MG: 5 TABLET ORAL at 08:07

## 2017-01-01 RX ADMIN — ENALAPRILAT 1.25 MG: 1.25 INJECTION INTRAVENOUS at 01:12

## 2017-01-01 RX ADMIN — DOCUSATE SODIUM 150 MG: 50 LIQUID ORAL at 09:44

## 2017-01-01 RX ADMIN — CARVEDILOL 25 MG: 12.5 TABLET, FILM COATED ORAL at 09:05

## 2017-01-01 RX ADMIN — TERAZOSIN HYDROCHLORIDE ANHYDROUS 5 MG: 5 CAPSULE ORAL at 20:49

## 2017-01-01 RX ADMIN — MICONAZOLE NITRATE: 2 CREAM TOPICAL at 21:20

## 2017-01-01 RX ADMIN — QUETIAPINE FUMARATE 12.5 MG: 25 TABLET ORAL at 10:39

## 2017-01-01 RX ADMIN — IPRATROPIUM BROMIDE AND ALBUTEROL SULFATE 3 ML: .5; 3 SOLUTION RESPIRATORY (INHALATION) at 08:33

## 2017-01-01 RX ADMIN — QUETIAPINE FUMARATE 12.5 MG: 25 TABLET, FILM COATED ORAL at 08:58

## 2017-01-01 RX ADMIN — TERAZOSIN HYDROCHLORIDE ANHYDROUS 2 MG: 2 CAPSULE ORAL at 22:44

## 2017-01-01 RX ADMIN — QUETIAPINE FUMARATE 12.5 MG: 25 TABLET, FILM COATED ORAL at 09:28

## 2017-01-01 RX ADMIN — QUETIAPINE FUMARATE 25 MG: 25 TABLET ORAL at 10:23

## 2017-01-01 RX ADMIN — ALBUMIN HUMAN 12.5 G: 0.25 SOLUTION INTRAVENOUS at 15:57

## 2017-01-01 RX ADMIN — Medication 2 TABLET: at 21:57

## 2017-01-01 RX ADMIN — MICONAZOLE NITRATE: 20 POWDER TOPICAL at 08:08

## 2017-01-01 RX ADMIN — POLYETHYLENE GLYCOL 3350 17 G: 17 POWDER, FOR SOLUTION ORAL at 15:14

## 2017-01-01 RX ADMIN — ROBINUL 0.2 MG: 0.2 INJECTION INTRAMUSCULAR; INTRAVENOUS at 04:41

## 2017-01-01 RX ADMIN — HYDRALAZINE HYDROCHLORIDE 50 MG: 50 TABLET ORAL at 15:39

## 2017-01-01 RX ADMIN — ROBINUL 0.2 MG: 0.2 INJECTION INTRAMUSCULAR; INTRAVENOUS at 03:55

## 2017-01-01 RX ADMIN — NICARDIPINE HYDROCHLORIDE 5 MG/HR: 25 INJECTION INTRAVENOUS at 14:45

## 2017-01-01 RX ADMIN — THIAMINE HYDROCHLORIDE 500 MG: 100 INJECTION, SOLUTION INTRAMUSCULAR; INTRAVENOUS at 14:33

## 2017-01-01 RX ADMIN — LORAZEPAM 0.5 MG: 2 INJECTION INTRAMUSCULAR; INTRAVENOUS at 02:32

## 2017-01-01 RX ADMIN — MICONAZOLE NITRATE: 20 POWDER TOPICAL at 20:49

## 2017-01-01 RX ADMIN — ALBUTEROL SULFATE 2.5 MG: 2.5 SOLUTION RESPIRATORY (INHALATION) at 10:24

## 2017-01-01 RX ADMIN — FAMOTIDINE 20 MG: 10 INJECTION, SOLUTION INTRAVENOUS at 11:02

## 2017-01-01 RX ADMIN — QUETIAPINE FUMARATE 12.5 MG: 25 TABLET, FILM COATED ORAL at 23:33

## 2017-01-01 RX ADMIN — MICONAZOLE NITRATE: 20 POWDER TOPICAL at 22:49

## 2017-01-01 RX ADMIN — CARVEDILOL 25 MG: 12.5 TABLET, FILM COATED ORAL at 21:19

## 2017-01-01 RX ADMIN — FOLIC ACID 1 MG: 5 INJECTION, SOLUTION INTRAMUSCULAR; INTRAVENOUS; SUBCUTANEOUS at 09:12

## 2017-01-01 RX ADMIN — Medication: at 21:12

## 2017-01-01 RX ADMIN — IPRATROPIUM BROMIDE AND ALBUTEROL SULFATE 3 ML: .5; 3 SOLUTION RESPIRATORY (INHALATION) at 20:18

## 2017-01-01 RX ADMIN — QUETIAPINE FUMARATE 12.5 MG: 25 TABLET, FILM COATED ORAL at 23:54

## 2017-01-01 RX ADMIN — HYDRALAZINE HYDROCHLORIDE 75 MG: 25 TABLET ORAL at 05:52

## 2017-01-01 RX ADMIN — ALBUTEROL SULFATE 2.5 MG: 2.5 SOLUTION RESPIRATORY (INHALATION) at 00:23

## 2017-01-01 RX ADMIN — TERAZOSIN HYDROCHLORIDE ANHYDROUS 5 MG: 5 CAPSULE ORAL at 09:30

## 2017-01-01 RX ADMIN — ALBUMIN HUMAN 25 G: 0.05 INJECTION, SOLUTION INTRAVENOUS at 20:51

## 2017-01-01 RX ADMIN — DOXYCYCLINE 100 MG: 100 INJECTION, POWDER, LYOPHILIZED, FOR SOLUTION INTRAVENOUS at 23:34

## 2017-01-01 RX ADMIN — HEPARIN SODIUM 5000 UNITS: 5000 INJECTION, SOLUTION INTRAVENOUS; SUBCUTANEOUS at 21:43

## 2017-01-01 RX ADMIN — QUETIAPINE FUMARATE 12.5 MG: 25 TABLET, FILM COATED ORAL at 14:13

## 2017-01-01 RX ADMIN — CASTOR OIL AND BALSAM, PERU: 788; 87 OINTMENT TOPICAL at 10:35

## 2017-01-01 RX ADMIN — CARVEDILOL 25 MG: 12.5 TABLET, FILM COATED ORAL at 08:54

## 2017-01-01 RX ADMIN — QUETIAPINE FUMARATE 25 MG: 25 TABLET, FILM COATED ORAL at 21:57

## 2017-01-01 RX ADMIN — TERAZOSIN HYDROCHLORIDE ANHYDROUS 2 MG: 2 CAPSULE ORAL at 21:42

## 2017-01-01 RX ADMIN — POLYETHYLENE GLYCOL 3350 17 G: 17 POWDER, FOR SOLUTION ORAL at 09:21

## 2017-01-01 RX ADMIN — DOXYCYCLINE 100 MG: 100 INJECTION, POWDER, LYOPHILIZED, FOR SOLUTION INTRAVENOUS at 21:03

## 2017-01-01 RX ADMIN — ATORVASTATIN CALCIUM 10 MG: 10 TABLET, FILM COATED ORAL at 21:16

## 2017-01-01 RX ADMIN — HYDRALAZINE HYDROCHLORIDE 50 MG: 50 TABLET ORAL at 08:55

## 2017-01-01 RX ADMIN — MICONAZOLE NITRATE: 2 CREAM TOPICAL at 21:00

## 2017-01-01 RX ADMIN — FUROSEMIDE 20 MG: 10 INJECTION, SOLUTION INTRAMUSCULAR; INTRAVENOUS at 08:56

## 2017-01-01 RX ADMIN — ROBINUL 0.2 MG: 0.2 INJECTION INTRAMUSCULAR; INTRAVENOUS at 20:55

## 2017-01-01 RX ADMIN — MICONAZOLE NITRATE: 20 POWDER TOPICAL at 21:16

## 2017-01-01 RX ADMIN — LORAZEPAM 1 MG: 2 INJECTION INTRAMUSCULAR; INTRAVENOUS at 16:28

## 2017-01-01 RX ADMIN — HYDRALAZINE HYDROCHLORIDE 75 MG: 25 TABLET ORAL at 17:03

## 2017-01-01 RX ADMIN — Medication 2 TABLET: at 21:19

## 2017-01-01 RX ADMIN — CARVEDILOL 25 MG: 12.5 TABLET, FILM COATED ORAL at 09:30

## 2017-01-01 RX ADMIN — HALOPERIDOL LACTATE 2 MG: 5 INJECTION, SOLUTION INTRAMUSCULAR at 03:23

## 2017-01-01 RX ADMIN — DEXTROSE AND SODIUM CHLORIDE 75 ML/HR: 5; 450 INJECTION, SOLUTION INTRAVENOUS at 19:18

## 2017-01-01 RX ADMIN — LACTULOSE 10 G: 10 SOLUTION ORAL at 17:50

## 2017-01-01 RX ADMIN — DOXYCYCLINE 100 MG: 100 INJECTION, POWDER, LYOPHILIZED, FOR SOLUTION INTRAVENOUS at 20:00

## 2017-01-01 RX ADMIN — CARVEDILOL 25 MG: 12.5 TABLET, FILM COATED ORAL at 09:29

## 2017-01-01 RX ADMIN — MORPHINE SULFATE 1 MG: 4 INJECTION, SOLUTION INTRAMUSCULAR; INTRAVENOUS at 18:24

## 2017-01-01 RX ADMIN — ROBINUL 0.2 MG: 0.2 INJECTION INTRAMUSCULAR; INTRAVENOUS at 16:16

## 2017-01-01 RX ADMIN — HEPARIN SODIUM 5000 UNITS: 5000 INJECTION, SOLUTION INTRAVENOUS; SUBCUTANEOUS at 09:39

## 2017-01-01 RX ADMIN — TERAZOSIN HYDROCHLORIDE ANHYDROUS 5 MG: 5 CAPSULE ORAL at 22:26

## 2017-01-01 RX ADMIN — FOLIC ACID 1 MG: 5 INJECTION, SOLUTION INTRAMUSCULAR; INTRAVENOUS; SUBCUTANEOUS at 08:55

## 2017-01-01 RX ADMIN — POTASSIUM CHLORIDE 20 MEQ: 1.5 POWDER, FOR SOLUTION ORAL at 18:35

## 2017-01-01 RX ADMIN — TERAZOSIN HYDROCHLORIDE ANHYDROUS 5 MG: 5 CAPSULE ORAL at 08:58

## 2017-01-01 RX ADMIN — HEPARIN SODIUM 5000 UNITS: 5000 INJECTION, SOLUTION INTRAVENOUS; SUBCUTANEOUS at 20:16

## 2017-01-01 RX ADMIN — TERAZOSIN HYDROCHLORIDE ANHYDROUS 5 MG: 5 CAPSULE ORAL at 08:06

## 2017-01-01 RX ADMIN — AMLODIPINE BESYLATE 5 MG: 5 TABLET ORAL at 09:47

## 2017-01-01 RX ADMIN — HYDROCODONE BITARTRATE AND ACETAMINOPHEN 1 TABLET: 5; 325 TABLET ORAL at 08:57

## 2017-01-01 RX ADMIN — DOCUSATE SODIUM 150 MG: 50 LIQUID ORAL at 09:21

## 2017-01-01 RX ADMIN — Medication: at 15:59

## 2017-01-01 RX ADMIN — MICONAZOLE NITRATE 1 APPLICATION: 2 CREAM TOPICAL at 20:36

## 2017-01-01 RX ADMIN — FOLIC ACID 1 MG: 5 INJECTION, SOLUTION INTRAMUSCULAR; INTRAVENOUS; SUBCUTANEOUS at 11:04

## 2017-01-01 RX ADMIN — LORAZEPAM 1 MG: 2 INJECTION INTRAMUSCULAR; INTRAVENOUS at 13:46

## 2017-01-01 RX ADMIN — HYDRALAZINE HYDROCHLORIDE 75 MG: 25 TABLET ORAL at 05:50

## 2017-01-01 RX ADMIN — HYDRALAZINE HYDROCHLORIDE 75 MG: 25 TABLET ORAL at 20:52

## 2017-01-01 RX ADMIN — HYDRALAZINE HYDROCHLORIDE 50 MG: 50 TABLET ORAL at 05:01

## 2017-01-01 RX ADMIN — MICONAZOLE NITRATE: 20 POWDER TOPICAL at 20:08

## 2017-01-01 RX ADMIN — AMLODIPINE BESYLATE 5 MG: 5 TABLET ORAL at 08:59

## 2017-01-01 RX ADMIN — CARVEDILOL 25 MG: 12.5 TABLET, FILM COATED ORAL at 08:39

## 2017-01-01 RX ADMIN — MICONAZOLE NITRATE: 2 CREAM TOPICAL at 09:23

## 2017-01-01 RX ADMIN — POLYETHYLENE GLYCOL 3350 17 G: 17 POWDER, FOR SOLUTION ORAL at 10:34

## 2017-01-01 RX ADMIN — DOCUSATE SODIUM 150 MG: 50 LIQUID ORAL at 09:11

## 2017-01-01 RX ADMIN — TERAZOSIN HYDROCHLORIDE ANHYDROUS 5 MG: 5 CAPSULE ORAL at 21:14

## 2017-01-01 RX ADMIN — HYDRALAZINE HYDROCHLORIDE 75 MG: 25 TABLET ORAL at 21:58

## 2017-01-01 RX ADMIN — CARVEDILOL 25 MG: 12.5 TABLET, FILM COATED ORAL at 20:38

## 2017-01-01 RX ADMIN — POLYETHYLENE GLYCOL 3350 17 G: 17 POWDER, FOR SOLUTION ORAL at 09:04

## 2017-01-01 RX ADMIN — Medication 5 ML: at 09:36

## 2017-01-01 RX ADMIN — HYDRALAZINE HYDROCHLORIDE 50 MG: 50 TABLET ORAL at 14:00

## 2017-01-01 RX ADMIN — AMLODIPINE BESYLATE 5 MG: 5 TABLET ORAL at 09:21

## 2017-01-01 RX ADMIN — HEPARIN SODIUM 5000 UNITS: 5000 INJECTION, SOLUTION INTRAVENOUS; SUBCUTANEOUS at 21:08

## 2017-01-01 RX ADMIN — CARVEDILOL 25 MG: 12.5 TABLET, FILM COATED ORAL at 20:40

## 2017-01-01 RX ADMIN — HEPARIN SODIUM 5000 UNITS: 5000 INJECTION, SOLUTION INTRAVENOUS; SUBCUTANEOUS at 08:09

## 2017-01-01 RX ADMIN — TERAZOSIN HYDROCHLORIDE ANHYDROUS 5 MG: 5 CAPSULE ORAL at 21:03

## 2017-01-01 RX ADMIN — LORAZEPAM 2 MG: 2 INJECTION INTRAMUSCULAR; INTRAVENOUS at 20:05

## 2017-01-01 RX ADMIN — CARVEDILOL 25 MG: 12.5 TABLET, FILM COATED ORAL at 21:21

## 2017-01-01 RX ADMIN — HEPARIN SODIUM 5000 UNITS: 5000 INJECTION, SOLUTION INTRAVENOUS; SUBCUTANEOUS at 05:02

## 2017-01-01 RX ADMIN — CARVEDILOL 25 MG: 12.5 TABLET, FILM COATED ORAL at 12:44

## 2017-01-01 RX ADMIN — DOXYCYCLINE 100 MG: 100 INJECTION, POWDER, LYOPHILIZED, FOR SOLUTION INTRAVENOUS at 08:08

## 2017-01-01 RX ADMIN — LACTULOSE 10 G: 10 SOLUTION ORAL at 13:15

## 2017-01-01 RX ADMIN — MORPHINE SULFATE 1 MG: 4 INJECTION, SOLUTION INTRAMUSCULAR; INTRAVENOUS at 18:36

## 2017-01-01 RX ADMIN — QUETIAPINE FUMARATE 25 MG: 25 TABLET, FILM COATED ORAL at 21:33

## 2017-01-01 RX ADMIN — HEPARIN SODIUM 5000 UNITS: 5000 INJECTION, SOLUTION INTRAVENOUS; SUBCUTANEOUS at 20:52

## 2017-01-01 RX ADMIN — DOCUSATE SODIUM 150 MG: 50 LIQUID ORAL at 15:14

## 2017-01-01 RX ADMIN — AMLODIPINE BESYLATE 5 MG: 5 TABLET ORAL at 10:35

## 2017-01-01 RX ADMIN — CARVEDILOL 25 MG: 12.5 TABLET, FILM COATED ORAL at 09:21

## 2017-01-01 RX ADMIN — HALOPERIDOL LACTATE 2 MG: 5 INJECTION, SOLUTION INTRAMUSCULAR at 14:01

## 2017-01-01 RX ADMIN — DOXYCYCLINE 100 MG: 100 INJECTION, POWDER, LYOPHILIZED, FOR SOLUTION INTRAVENOUS at 10:11

## 2017-01-01 RX ADMIN — MICONAZOLE NITRATE: 20 POWDER TOPICAL at 09:02

## 2017-01-01 RX ADMIN — CASTOR OIL AND BALSAM, PERU: 788; 87 OINTMENT TOPICAL at 09:27

## 2017-01-01 RX ADMIN — NICARDIPINE HYDROCHLORIDE 5 MG/HR: 25 INJECTION INTRAVENOUS at 11:04

## 2017-01-01 RX ADMIN — HEPARIN SODIUM 5000 UNITS: 5000 INJECTION, SOLUTION INTRAVENOUS; SUBCUTANEOUS at 14:12

## 2017-01-01 RX ADMIN — LORAZEPAM 2 MG: 2 INJECTION INTRAMUSCULAR; INTRAVENOUS at 02:09

## 2017-01-01 RX ADMIN — TERAZOSIN HYDROCHLORIDE ANHYDROUS 5 MG: 5 CAPSULE ORAL at 09:44

## 2017-01-01 RX ADMIN — CARVEDILOL 25 MG: 12.5 TABLET, FILM COATED ORAL at 20:16

## 2017-01-01 RX ADMIN — Medication 1 PACKET: at 10:11

## 2017-01-01 RX ADMIN — CARVEDILOL 25 MG: 12.5 TABLET, FILM COATED ORAL at 20:09

## 2017-01-01 RX ADMIN — HYDRALAZINE HYDROCHLORIDE 75 MG: 25 TABLET ORAL at 06:01

## 2017-01-01 RX ADMIN — HEPARIN SODIUM 5000 UNITS: 5000 INJECTION, SOLUTION INTRAVENOUS; SUBCUTANEOUS at 08:18

## 2017-01-01 RX ADMIN — DOCUSATE SODIUM 150 MG: 50 LIQUID ORAL at 09:53

## 2017-01-01 RX ADMIN — DOXYCYCLINE 100 MG: 100 INJECTION, POWDER, LYOPHILIZED, FOR SOLUTION INTRAVENOUS at 18:14

## 2017-01-01 RX ADMIN — LACTULOSE 10 G: 10 SOLUTION ORAL at 18:24

## 2017-01-01 RX ADMIN — CASTOR OIL AND BALSAM, PERU: 788; 87 OINTMENT TOPICAL at 08:06

## 2017-01-01 RX ADMIN — FOLIC ACID 1 MG: 5 INJECTION, SOLUTION INTRAMUSCULAR; INTRAVENOUS; SUBCUTANEOUS at 08:11

## 2017-01-01 RX ADMIN — FUROSEMIDE 20 MG: 10 INJECTION, SOLUTION INTRAMUSCULAR; INTRAVENOUS at 16:16

## 2017-01-01 RX ADMIN — QUETIAPINE FUMARATE 25 MG: 25 TABLET, FILM COATED ORAL at 21:46

## 2017-01-01 RX ADMIN — ROBINUL 0.2 MG: 0.2 INJECTION INTRAMUSCULAR; INTRAVENOUS at 03:31

## 2017-01-01 RX ADMIN — CARVEDILOL 25 MG: 12.5 TABLET, FILM COATED ORAL at 21:14

## 2017-01-01 RX ADMIN — CARVEDILOL 25 MG: 12.5 TABLET, FILM COATED ORAL at 20:46

## 2017-01-01 RX ADMIN — MICONAZOLE NITRATE: 2 CREAM TOPICAL at 10:26

## 2017-01-01 RX ADMIN — Medication 2 TABLET: at 20:56

## 2017-01-01 RX ADMIN — MICONAZOLE NITRATE: 20 POWDER TOPICAL at 20:46

## 2017-01-01 RX ADMIN — IPRATROPIUM BROMIDE AND ALBUTEROL SULFATE 3 ML: .5; 3 SOLUTION RESPIRATORY (INHALATION) at 11:58

## 2017-01-01 RX ADMIN — DEXTROSE MONOHYDRATE 100 ML/HR: 50 INJECTION, SOLUTION INTRAVENOUS at 17:44

## 2017-01-01 RX ADMIN — BUMETANIDE 2.5 MG: 0.25 INJECTION, SOLUTION INTRAMUSCULAR; INTRAVENOUS at 00:25

## 2017-01-01 RX ADMIN — Medication 2 TABLET: at 20:46

## 2017-01-01 RX ADMIN — FUROSEMIDE 40 MG: 10 INJECTION, SOLUTION INTRAMUSCULAR; INTRAVENOUS at 21:08

## 2017-01-01 RX ADMIN — CARVEDILOL 25 MG: 12.5 TABLET, FILM COATED ORAL at 10:34

## 2017-01-01 RX ADMIN — Medication 1 PACKET: at 22:50

## 2017-01-01 RX ADMIN — MICONAZOLE NITRATE: 20 POWDER TOPICAL at 08:45

## 2017-01-01 RX ADMIN — TERAZOSIN HYDROCHLORIDE ANHYDROUS 5 MG: 5 CAPSULE ORAL at 21:54

## 2017-01-01 RX ADMIN — FOLIC ACID: 5 INJECTION, SOLUTION INTRAMUSCULAR; INTRAVENOUS; SUBCUTANEOUS at 14:54

## 2017-01-01 RX ADMIN — Medication 2 TABLET: at 20:40

## 2017-01-01 RX ADMIN — HALOPERIDOL LACTATE 2 MG: 5 INJECTION, SOLUTION INTRAMUSCULAR at 02:21

## 2017-01-01 RX ADMIN — Medication: at 21:21

## 2017-01-01 RX ADMIN — ALBUMIN HUMAN 12.5 G: 0.25 SOLUTION INTRAVENOUS at 00:12

## 2017-01-01 RX ADMIN — ATORVASTATIN CALCIUM 10 MG: 10 TABLET, FILM COATED ORAL at 21:19

## 2017-01-01 RX ADMIN — FOLIC ACID 1 MG: 5 INJECTION, SOLUTION INTRAMUSCULAR; INTRAVENOUS; SUBCUTANEOUS at 09:43

## 2017-01-01 RX ADMIN — HYDRALAZINE HYDROCHLORIDE 50 MG: 50 TABLET ORAL at 22:48

## 2017-01-01 RX ADMIN — CARVEDILOL 25 MG: 12.5 TABLET, FILM COATED ORAL at 08:46

## 2017-01-01 RX ADMIN — HEPARIN SODIUM 5000 UNITS: 5000 INJECTION, SOLUTION INTRAVENOUS; SUBCUTANEOUS at 21:04

## 2017-01-01 RX ADMIN — QUETIAPINE FUMARATE 12.5 MG: 25 TABLET, FILM COATED ORAL at 20:50

## 2017-01-01 RX ADMIN — TERAZOSIN HYDROCHLORIDE ANHYDROUS 5 MG: 5 CAPSULE ORAL at 20:52

## 2017-01-01 RX ADMIN — FOLIC ACID 1 MG: 5 INJECTION, SOLUTION INTRAMUSCULAR; INTRAVENOUS; SUBCUTANEOUS at 09:37

## 2017-01-01 RX ADMIN — SODIUM BICARBONATE 650 MG TABLET 1300 MG: at 11:03

## 2017-01-01 RX ADMIN — QUETIAPINE FUMARATE 12.5 MG: 25 TABLET ORAL at 09:30

## 2017-01-01 RX ADMIN — LACTULOSE 10 G: 10 SOLUTION ORAL at 14:45

## 2017-01-01 RX ADMIN — HYDRALAZINE HYDROCHLORIDE 50 MG: 50 TABLET ORAL at 21:24

## 2017-01-01 RX ADMIN — TERAZOSIN HYDROCHLORIDE ANHYDROUS 2 MG: 2 CAPSULE ORAL at 09:48

## 2017-01-01 RX ADMIN — THIAMINE HYDROCHLORIDE 500 MG: 100 INJECTION, SOLUTION INTRAMUSCULAR; INTRAVENOUS at 14:05

## 2017-01-01 RX ADMIN — ALBUMIN HUMAN 12.5 G: 0.25 SOLUTION INTRAVENOUS at 23:55

## 2017-01-01 RX ADMIN — FUROSEMIDE 20 MG: 10 INJECTION, SOLUTION INTRAMUSCULAR; INTRAVENOUS at 17:50

## 2017-01-01 RX ADMIN — FUROSEMIDE 40 MG: 10 INJECTION, SOLUTION INTRAMUSCULAR; INTRAVENOUS at 16:19

## 2017-01-01 RX ADMIN — TERAZOSIN HYDROCHLORIDE ANHYDROUS 2 MG: 2 CAPSULE ORAL at 20:41

## 2017-01-01 RX ADMIN — Medication: at 20:41

## 2017-01-01 RX ADMIN — CARVEDILOL 25 MG: 12.5 TABLET, FILM COATED ORAL at 09:18

## 2017-01-01 RX ADMIN — MICONAZOLE NITRATE 1 APPLICATION: 20 POWDER TOPICAL at 09:36

## 2017-01-01 RX ADMIN — CEFTRIAXONE SODIUM 1 G: 1 INJECTION, SOLUTION INTRAVENOUS at 20:52

## 2017-01-01 RX ADMIN — TERAZOSIN HYDROCHLORIDE ANHYDROUS 2 MG: 2 CAPSULE ORAL at 21:19

## 2017-01-01 RX ADMIN — THIAMINE HYDROCHLORIDE 500 MG: 100 INJECTION, SOLUTION INTRAMUSCULAR; INTRAVENOUS at 14:12

## 2017-01-01 RX ADMIN — MICONAZOLE NITRATE: 20 POWDER TOPICAL at 09:42

## 2017-01-01 RX ADMIN — DOXYCYCLINE 100 MG: 100 INJECTION, POWDER, LYOPHILIZED, FOR SOLUTION INTRAVENOUS at 17:32

## 2017-01-01 RX ADMIN — CARVEDILOL 25 MG: 12.5 TABLET, FILM COATED ORAL at 09:50

## 2017-01-01 RX ADMIN — CASTOR OIL AND BALSAM, PERU: 788; 87 OINTMENT TOPICAL at 20:53

## 2017-01-01 RX ADMIN — IPRATROPIUM BROMIDE AND ALBUTEROL SULFATE 3 ML: .5; 3 SOLUTION RESPIRATORY (INHALATION) at 08:46

## 2017-01-01 RX ADMIN — CASTOR OIL AND BALSAM, PERU: 788; 87 OINTMENT TOPICAL at 21:00

## 2017-01-01 RX ADMIN — QUETIAPINE FUMARATE 25 MG: 25 TABLET ORAL at 07:37

## 2017-01-01 RX ADMIN — QUETIAPINE FUMARATE 25 MG: 25 TABLET, FILM COATED ORAL at 20:46

## 2017-01-01 RX ADMIN — HYDROCODONE BITARTRATE AND ACETAMINOPHEN 2 TABLET: 5; 325 TABLET ORAL at 22:14

## 2017-01-01 RX ADMIN — HYDROMORPHONE HYDROCHLORIDE 0.25 MG: 1 INJECTION, SOLUTION INTRAMUSCULAR; INTRAVENOUS; SUBCUTANEOUS at 17:52

## 2017-01-01 RX ADMIN — ATORVASTATIN CALCIUM 10 MG: 10 TABLET, FILM COATED ORAL at 22:13

## 2017-01-01 RX ADMIN — AMLODIPINE BESYLATE 5 MG: 5 TABLET ORAL at 08:09

## 2017-01-01 RX ADMIN — Medication 10 ML: at 20:38

## 2017-01-01 RX ADMIN — IPRATROPIUM BROMIDE AND ALBUTEROL SULFATE 3 ML: .5; 3 SOLUTION RESPIRATORY (INHALATION) at 15:34

## 2017-01-01 RX ADMIN — MICONAZOLE NITRATE: 20 POWDER TOPICAL at 09:51

## 2017-01-01 RX ADMIN — HYDRALAZINE HYDROCHLORIDE 50 MG: 50 TABLET ORAL at 05:00

## 2017-01-01 RX ADMIN — CARVEDILOL 25 MG: 12.5 TABLET, FILM COATED ORAL at 09:08

## 2017-01-01 RX ADMIN — HEPARIN SODIUM 5000 UNITS: 5000 INJECTION, SOLUTION INTRAVENOUS; SUBCUTANEOUS at 20:08

## 2017-01-01 RX ADMIN — QUETIAPINE FUMARATE 25 MG: 25 TABLET, FILM COATED ORAL at 21:34

## 2017-01-01 RX ADMIN — ATORVASTATIN CALCIUM 10 MG: 10 TABLET, FILM COATED ORAL at 21:13

## 2017-01-01 RX ADMIN — MICONAZOLE NITRATE: 20 POWDER TOPICAL at 21:00

## 2017-01-01 RX ADMIN — CARVEDILOL 25 MG: 12.5 TABLET, FILM COATED ORAL at 21:45

## 2017-01-01 RX ADMIN — THIAMINE HYDROCHLORIDE 100 MG: 100 INJECTION, SOLUTION INTRAMUSCULAR; INTRAVENOUS at 14:54

## 2017-01-01 RX ADMIN — CARVEDILOL 25 MG: 12.5 TABLET, FILM COATED ORAL at 21:03

## 2017-01-01 RX ADMIN — TERAZOSIN HYDROCHLORIDE ANHYDROUS 5 MG: 5 CAPSULE ORAL at 21:45

## 2017-01-01 RX ADMIN — ENALAPRILAT 1.25 MG: 1.25 INJECTION INTRAVENOUS at 22:01

## 2017-01-01 RX ADMIN — CARVEDILOL 25 MG: 12.5 TABLET, FILM COATED ORAL at 21:18

## 2017-01-01 RX ADMIN — ATORVASTATIN CALCIUM 10 MG: 10 TABLET, FILM COATED ORAL at 20:10

## 2017-01-01 RX ADMIN — ALBUMIN HUMAN 25 G: 0.05 INJECTION, SOLUTION INTRAVENOUS at 16:24

## 2017-01-01 RX ADMIN — Medication 2 TABLET: at 23:16

## 2017-01-01 RX ADMIN — CHLOROTHIAZIDE SODIUM 500 MG: 500 INJECTION, POWDER, LYOPHILIZED, FOR SOLUTION INTRAVENOUS at 12:40

## 2017-01-01 RX ADMIN — HYDRALAZINE HYDROCHLORIDE 50 MG: 50 TABLET ORAL at 05:16

## 2017-01-01 RX ADMIN — TERAZOSIN HYDROCHLORIDE ANHYDROUS 5 MG: 5 CAPSULE ORAL at 20:41

## 2017-01-01 RX ADMIN — HYDRALAZINE HYDROCHLORIDE 50 MG: 50 TABLET ORAL at 08:58

## 2017-01-01 RX ADMIN — Medication 1 PACKET: at 20:47

## 2017-01-01 RX ADMIN — HEPARIN SODIUM 5000 UNITS: 5000 INJECTION, SOLUTION INTRAVENOUS; SUBCUTANEOUS at 05:27

## 2017-01-01 RX ADMIN — IPRATROPIUM BROMIDE AND ALBUTEROL SULFATE 3 ML: .5; 3 SOLUTION RESPIRATORY (INHALATION) at 13:04

## 2017-01-01 RX ADMIN — Medication: at 12:19

## 2017-01-01 RX ADMIN — HYDRALAZINE HYDROCHLORIDE 50 MG: 50 TABLET ORAL at 14:57

## 2017-01-01 RX ADMIN — HEPARIN SODIUM 5000 UNITS: 5000 INJECTION, SOLUTION INTRAVENOUS; SUBCUTANEOUS at 21:02

## 2017-01-01 RX ADMIN — HYDRALAZINE HYDROCHLORIDE 50 MG: 50 TABLET ORAL at 05:15

## 2017-01-01 RX ADMIN — CARVEDILOL 25 MG: 12.5 TABLET, FILM COATED ORAL at 09:10

## 2017-01-01 RX ADMIN — TERAZOSIN HYDROCHLORIDE ANHYDROUS 5 MG: 5 CAPSULE ORAL at 20:45

## 2017-01-01 RX ADMIN — DOXYCYCLINE HYCLATE 100 MG: 100 CAPSULE ORAL at 06:51

## 2017-01-01 RX ADMIN — IPRATROPIUM BROMIDE AND ALBUTEROL SULFATE 3 ML: .5; 3 SOLUTION RESPIRATORY (INHALATION) at 07:00

## 2017-01-01 RX ADMIN — MICONAZOLE NITRATE: 20 POWDER TOPICAL at 09:13

## 2017-01-01 RX ADMIN — TERAZOSIN HYDROCHLORIDE ANHYDROUS 5 MG: 5 CAPSULE ORAL at 10:32

## 2017-01-01 RX ADMIN — LACTULOSE 10 G: 10 SOLUTION ORAL at 17:25

## 2017-01-01 RX ADMIN — MICONAZOLE NITRATE: 2 CREAM TOPICAL at 20:20

## 2017-01-01 RX ADMIN — TERAZOSIN HYDROCHLORIDE ANHYDROUS 2 MG: 2 CAPSULE ORAL at 08:26

## 2017-01-01 RX ADMIN — CARVEDILOL 25 MG: 12.5 TABLET, FILM COATED ORAL at 20:10

## 2017-01-01 RX ADMIN — FAMOTIDINE 20 MG: 10 INJECTION, SOLUTION INTRAVENOUS at 08:14

## 2017-01-01 RX ADMIN — CEFTRIAXONE SODIUM 1 G: 1 INJECTION, SOLUTION INTRAVENOUS at 20:11

## 2017-01-01 RX ADMIN — NICARDIPINE HYDROCHLORIDE 5 MG/HR: 25 INJECTION INTRAVENOUS at 08:30

## 2017-01-01 RX ADMIN — HEPARIN SODIUM 5000 UNITS: 5000 INJECTION, SOLUTION INTRAVENOUS; SUBCUTANEOUS at 21:17

## 2017-01-01 RX ADMIN — QUETIAPINE FUMARATE 25 MG: 25 TABLET ORAL at 09:04

## 2017-01-01 RX ADMIN — HEPARIN SODIUM 5000 UNITS: 5000 INJECTION, SOLUTION INTRAVENOUS; SUBCUTANEOUS at 09:44

## 2017-01-01 RX ADMIN — HYDRALAZINE HYDROCHLORIDE 75 MG: 25 TABLET ORAL at 13:36

## 2017-01-01 RX ADMIN — CASTOR OIL AND BALSAM, PERU: 788; 87 OINTMENT TOPICAL at 20:19

## 2017-01-01 RX ADMIN — LORAZEPAM 2 MG: 2 INJECTION INTRAMUSCULAR; INTRAVENOUS at 01:07

## 2017-01-01 RX ADMIN — Medication 1 PACKET: at 17:42

## 2017-01-01 RX ADMIN — IPRATROPIUM BROMIDE AND ALBUTEROL SULFATE 3 ML: .5; 3 SOLUTION RESPIRATORY (INHALATION) at 12:23

## 2017-01-01 RX ADMIN — ATORVASTATIN CALCIUM 10 MG: 10 TABLET, FILM COATED ORAL at 20:09

## 2017-01-01 RX ADMIN — MICONAZOLE NITRATE 1 APPLICATION: 2 CREAM TOPICAL at 08:01

## 2017-01-01 RX ADMIN — ATORVASTATIN CALCIUM 10 MG: 10 TABLET, FILM COATED ORAL at 22:15

## 2017-01-01 RX ADMIN — CEFTRIAXONE SODIUM 1 G: 1 INJECTION, SOLUTION INTRAVENOUS at 20:00

## 2017-01-01 RX ADMIN — FOLIC ACID 1 MG: 5 INJECTION, SOLUTION INTRAMUSCULAR; INTRAVENOUS; SUBCUTANEOUS at 08:18

## 2017-01-01 RX ADMIN — CARVEDILOL 25 MG: 12.5 TABLET, FILM COATED ORAL at 21:02

## 2017-01-01 RX ADMIN — MICONAZOLE NITRATE: 20 POWDER TOPICAL at 09:18

## 2017-01-01 RX ADMIN — HYDRALAZINE HYDROCHLORIDE 75 MG: 25 TABLET ORAL at 22:13

## 2017-01-01 RX ADMIN — CARVEDILOL 25 MG: 12.5 TABLET, FILM COATED ORAL at 02:10

## 2017-01-01 RX ADMIN — LORAZEPAM 2 MG: 2 INJECTION INTRAMUSCULAR; INTRAVENOUS at 18:48

## 2017-01-01 RX ADMIN — MICONAZOLE NITRATE: 2 CREAM TOPICAL at 09:09

## 2017-01-01 RX ADMIN — ATORVASTATIN CALCIUM 10 MG: 10 TABLET, FILM COATED ORAL at 20:01

## 2017-01-01 RX ADMIN — ATORVASTATIN CALCIUM 10 MG: 10 TABLET, FILM COATED ORAL at 20:35

## 2017-01-01 RX ADMIN — HYDRALAZINE HYDROCHLORIDE 50 MG: 50 TABLET ORAL at 21:19

## 2017-01-01 RX ADMIN — CARVEDILOL 25 MG: 12.5 TABLET, FILM COATED ORAL at 08:07

## 2017-01-01 RX ADMIN — HYDRALAZINE HYDROCHLORIDE 75 MG: 25 TABLET ORAL at 20:01

## 2017-01-01 RX ADMIN — TERAZOSIN HYDROCHLORIDE ANHYDROUS 2 MG: 2 CAPSULE ORAL at 09:34

## 2017-01-01 RX ADMIN — INSULIN HUMAN 2 UNITS: 100 INJECTION, SOLUTION PARENTERAL at 18:09

## 2017-01-01 RX ADMIN — POLYETHYLENE GLYCOL 3350 17 G: 17 POWDER, FOR SOLUTION ORAL at 08:07

## 2017-01-01 RX ADMIN — TERAZOSIN HYDROCHLORIDE ANHYDROUS 5 MG: 5 CAPSULE ORAL at 21:13

## 2017-01-01 RX ADMIN — CASTOR OIL AND BALSAM, PERU: 788; 87 OINTMENT TOPICAL at 20:49

## 2017-01-01 RX ADMIN — MICONAZOLE NITRATE: 2 CREAM TOPICAL at 10:21

## 2017-01-01 RX ADMIN — Medication 30 ML: at 09:33

## 2017-01-01 RX ADMIN — IPRATROPIUM BROMIDE AND ALBUTEROL SULFATE 3 ML: .5; 3 SOLUTION RESPIRATORY (INHALATION) at 08:39

## 2017-01-01 RX ADMIN — HALOPERIDOL LACTATE 2 MG: 5 INJECTION, SOLUTION INTRAMUSCULAR at 16:17

## 2017-01-01 RX ADMIN — Medication: at 13:24

## 2017-01-01 RX ADMIN — TERAZOSIN HYDROCHLORIDE ANHYDROUS 5 MG: 5 CAPSULE ORAL at 08:22

## 2017-01-01 RX ADMIN — LACTULOSE 10 G: 10 SOLUTION ORAL at 09:32

## 2017-01-01 RX ADMIN — Medication 10 ML: at 20:52

## 2017-01-01 RX ADMIN — LORAZEPAM 2 MG: 2 INJECTION INTRAMUSCULAR; INTRAVENOUS at 21:00

## 2017-01-01 RX ADMIN — FAMOTIDINE 20 MG: 10 INJECTION, SOLUTION INTRAVENOUS at 08:51

## 2017-01-01 RX ADMIN — LORAZEPAM 0.5 MG: 2 INJECTION INTRAMUSCULAR; INTRAVENOUS at 08:55

## 2017-01-01 RX ADMIN — HEPARIN SODIUM 5000 UNITS: 5000 INJECTION, SOLUTION INTRAVENOUS; SUBCUTANEOUS at 14:05

## 2017-01-01 RX ADMIN — Medication 1 PACKET: at 21:30

## 2017-01-01 RX ADMIN — HEPARIN SODIUM 5000 UNITS: 5000 INJECTION, SOLUTION INTRAVENOUS; SUBCUTANEOUS at 10:16

## 2017-01-01 RX ADMIN — DIAZEPAM 5 MG: 5 INJECTION, SOLUTION INTRAMUSCULAR; INTRAVENOUS at 05:12

## 2017-01-01 RX ADMIN — TERAZOSIN HYDROCHLORIDE ANHYDROUS 5 MG: 5 CAPSULE ORAL at 08:11

## 2017-01-01 RX ADMIN — QUETIAPINE FUMARATE 12.5 MG: 25 TABLET ORAL at 10:12

## 2017-01-01 RX ADMIN — HYDRALAZINE HYDROCHLORIDE 75 MG: 25 TABLET ORAL at 21:00

## 2017-01-01 RX ADMIN — QUETIAPINE FUMARATE 12.5 MG: 25 TABLET ORAL at 09:31

## 2017-01-01 RX ADMIN — FOLIC ACID 1 MG: 5 INJECTION, SOLUTION INTRAMUSCULAR; INTRAVENOUS; SUBCUTANEOUS at 09:22

## 2017-01-01 RX ADMIN — CEFTRIAXONE SODIUM 1 G: 1 INJECTION, SOLUTION INTRAVENOUS at 21:03

## 2017-01-01 RX ADMIN — HALOPERIDOL LACTATE 2 MG: 5 INJECTION, SOLUTION INTRAMUSCULAR at 11:23

## 2017-01-01 RX ADMIN — HYDRALAZINE HYDROCHLORIDE 75 MG: 25 TABLET ORAL at 22:00

## 2017-01-01 RX ADMIN — TERAZOSIN HYDROCHLORIDE ANHYDROUS 5 MG: 5 CAPSULE ORAL at 02:10

## 2017-01-01 RX ADMIN — HYDRALAZINE HYDROCHLORIDE 50 MG: 50 TABLET ORAL at 20:10

## 2017-01-01 RX ADMIN — LORAZEPAM 1 MG: 2 INJECTION INTRAMUSCULAR; INTRAVENOUS at 02:15

## 2017-01-01 RX ADMIN — MICONAZOLE NITRATE: 20 POWDER TOPICAL at 10:26

## 2017-01-01 RX ADMIN — HYDRALAZINE HYDROCHLORIDE 75 MG: 25 TABLET ORAL at 21:01

## 2017-01-01 RX ADMIN — DEXTROSE AND SODIUM CHLORIDE 125 ML/HR: 5; 450 INJECTION, SOLUTION INTRAVENOUS at 13:30

## 2017-01-01 RX ADMIN — FAMOTIDINE 20 MG: 10 INJECTION, SOLUTION INTRAVENOUS at 08:07

## 2017-01-01 RX ADMIN — MICONAZOLE NITRATE: 2 CREAM TOPICAL at 09:43

## 2017-01-01 RX ADMIN — FUROSEMIDE 20 MG: 20 TABLET ORAL at 09:28

## 2017-01-01 RX ADMIN — FAMOTIDINE 20 MG: 10 INJECTION, SOLUTION INTRAVENOUS at 08:55

## 2017-01-01 RX ADMIN — CARVEDILOL 25 MG: 12.5 TABLET, FILM COATED ORAL at 08:18

## 2017-01-01 RX ADMIN — FUROSEMIDE 20 MG: 20 TABLET ORAL at 08:50

## 2017-01-01 RX ADMIN — HALOPERIDOL LACTATE 2 MG: 5 INJECTION, SOLUTION INTRAMUSCULAR at 08:29

## 2017-01-01 RX ADMIN — CARVEDILOL 25 MG: 12.5 TABLET, FILM COATED ORAL at 11:03

## 2017-01-01 RX ADMIN — MICONAZOLE NITRATE: 20 POWDER TOPICAL at 21:28

## 2017-01-01 RX ADMIN — HYDRALAZINE HYDROCHLORIDE 50 MG: 50 TABLET ORAL at 11:02

## 2017-01-01 RX ADMIN — LORAZEPAM 0.5 MG: 2 INJECTION INTRAMUSCULAR; INTRAVENOUS at 08:11

## 2017-01-01 RX ADMIN — IPRATROPIUM BROMIDE AND ALBUTEROL SULFATE 3 ML: .5; 3 SOLUTION RESPIRATORY (INHALATION) at 05:31

## 2017-01-01 RX ADMIN — MICONAZOLE NITRATE: 2 CREAM TOPICAL at 20:39

## 2017-01-01 RX ADMIN — FAMOTIDINE 20 MG: 10 INJECTION, SOLUTION INTRAVENOUS at 09:02

## 2017-01-01 RX ADMIN — MICONAZOLE NITRATE: 20 POWDER TOPICAL at 20:02

## 2017-01-01 RX ADMIN — QUETIAPINE FUMARATE 25 MG: 25 TABLET, FILM COATED ORAL at 22:23

## 2017-01-01 RX ADMIN — TERAZOSIN HYDROCHLORIDE ANHYDROUS 5 MG: 5 CAPSULE ORAL at 21:34

## 2017-01-01 RX ADMIN — HYDRALAZINE HYDROCHLORIDE 50 MG: 50 TABLET ORAL at 21:28

## 2017-01-01 RX ADMIN — HALOPERIDOL LACTATE 2 MG: 5 INJECTION, SOLUTION INTRAMUSCULAR at 20:39

## 2017-01-01 RX ADMIN — MICONAZOLE NITRATE: 2 CREAM TOPICAL at 20:08

## 2017-01-01 RX ADMIN — CARVEDILOL 25 MG: 12.5 TABLET, FILM COATED ORAL at 13:16

## 2017-01-01 RX ADMIN — HYDRALAZINE HYDROCHLORIDE 50 MG: 50 TABLET ORAL at 09:05

## 2017-01-01 RX ADMIN — NICARDIPINE HYDROCHLORIDE 5 MG/HR: 25 INJECTION INTRAVENOUS at 17:36

## 2017-01-01 RX ADMIN — TERAZOSIN HYDROCHLORIDE ANHYDROUS 5 MG: 5 CAPSULE ORAL at 08:18

## 2017-01-01 RX ADMIN — BISACODYL 10 MG: 10 SUPPOSITORY RECTAL at 08:32

## 2017-01-01 RX ADMIN — ALBUMIN HUMAN 50 G: 0.25 SOLUTION INTRAVENOUS at 03:22

## 2017-01-01 RX ADMIN — THIAMINE HYDROCHLORIDE 500 MG: 100 INJECTION, SOLUTION INTRAMUSCULAR; INTRAVENOUS at 22:23

## 2017-01-01 RX ADMIN — HEPARIN SODIUM 5000 UNITS: 5000 INJECTION, SOLUTION INTRAVENOUS; SUBCUTANEOUS at 20:03

## 2017-01-01 RX ADMIN — MICONAZOLE NITRATE: 20 POWDER TOPICAL at 20:36

## 2017-01-01 RX ADMIN — IPRATROPIUM BROMIDE AND ALBUTEROL SULFATE 3 ML: .5; 3 SOLUTION RESPIRATORY (INHALATION) at 08:51

## 2017-01-01 RX ADMIN — CARVEDILOL 25 MG: 12.5 TABLET, FILM COATED ORAL at 21:07

## 2017-01-01 RX ADMIN — HYDRALAZINE HYDROCHLORIDE 75 MG: 25 TABLET ORAL at 14:25

## 2017-01-01 RX ADMIN — MICONAZOLE NITRATE: 2 CREAM TOPICAL at 20:54

## 2017-01-01 RX ADMIN — HEPARIN SODIUM 5000 UNITS: 5000 INJECTION, SOLUTION INTRAVENOUS; SUBCUTANEOUS at 05:06

## 2017-01-01 RX ADMIN — THIAMINE HYDROCHLORIDE 100 MG: 100 INJECTION, SOLUTION INTRAMUSCULAR; INTRAVENOUS at 11:50

## 2017-01-01 RX ADMIN — IPRATROPIUM BROMIDE AND ALBUTEROL SULFATE 3 ML: .5; 3 SOLUTION RESPIRATORY (INHALATION) at 15:18

## 2017-01-01 RX ADMIN — DEXTROSE AND SODIUM CHLORIDE 125 ML/HR: 5; 450 INJECTION, SOLUTION INTRAVENOUS at 03:24

## 2017-01-01 RX ADMIN — CARVEDILOL 25 MG: 12.5 TABLET, FILM COATED ORAL at 20:48

## 2017-01-01 RX ADMIN — QUETIAPINE FUMARATE 25 MG: 25 TABLET ORAL at 21:05

## 2017-01-01 RX ADMIN — CARVEDILOL 25 MG: 12.5 TABLET, FILM COATED ORAL at 20:01

## 2017-01-01 RX ADMIN — HYDRALAZINE HYDROCHLORIDE 50 MG: 50 TABLET ORAL at 06:51

## 2017-01-01 RX ADMIN — DOCUSATE SODIUM 150 MG: 50 LIQUID ORAL at 08:58

## 2017-01-01 RX ADMIN — HYDRALAZINE HYDROCHLORIDE 50 MG: 50 TABLET ORAL at 14:47

## 2017-01-01 RX ADMIN — POTASSIUM CHLORIDE AND DEXTROSE MONOHYDRATE 75 ML/HR: 150; 5 INJECTION, SOLUTION INTRAVENOUS at 20:04

## 2017-01-01 RX ADMIN — FUROSEMIDE 60 MG: 10 INJECTION, SOLUTION INTRAMUSCULAR; INTRAVENOUS at 15:22

## 2017-01-01 RX ADMIN — TERAZOSIN HYDROCHLORIDE ANHYDROUS 5 MG: 5 CAPSULE ORAL at 22:48

## 2017-01-01 RX ADMIN — TERAZOSIN HYDROCHLORIDE ANHYDROUS 5 MG: 5 CAPSULE ORAL at 10:10

## 2017-01-01 RX ADMIN — CARVEDILOL 25 MG: 12.5 TABLET, FILM COATED ORAL at 10:33

## 2017-01-01 RX ADMIN — Medication: at 21:55

## 2017-01-01 RX ADMIN — QUETIAPINE FUMARATE 12.5 MG: 25 TABLET, FILM COATED ORAL at 01:02

## 2017-01-01 RX ADMIN — ATORVASTATIN CALCIUM 10 MG: 10 TABLET, FILM COATED ORAL at 21:38

## 2017-01-01 RX ADMIN — IPRATROPIUM BROMIDE AND ALBUTEROL SULFATE 3 ML: .5; 3 SOLUTION RESPIRATORY (INHALATION) at 19:54

## 2017-01-01 RX ADMIN — Medication 10 ML: at 19:52

## 2017-01-01 RX ADMIN — HALOPERIDOL LACTATE 2 MG: 5 INJECTION, SOLUTION INTRAMUSCULAR at 15:30

## 2017-01-01 RX ADMIN — Medication 2 TABLET: at 21:21

## 2017-01-01 RX ADMIN — CARVEDILOL 25 MG: 12.5 TABLET, FILM COATED ORAL at 21:38

## 2017-01-01 RX ADMIN — HEPARIN SODIUM 5000 UNITS: 5000 INJECTION, SOLUTION INTRAVENOUS; SUBCUTANEOUS at 22:16

## 2017-01-01 RX ADMIN — CARVEDILOL 25 MG: 12.5 TABLET, FILM COATED ORAL at 22:13

## 2017-01-01 RX ADMIN — CARVEDILOL 25 MG: 12.5 TABLET, FILM COATED ORAL at 08:08

## 2017-01-01 RX ADMIN — QUETIAPINE FUMARATE 25 MG: 25 TABLET, FILM COATED ORAL at 20:56

## 2017-01-01 RX ADMIN — DOXYCYCLINE 100 MG: 100 INJECTION, POWDER, LYOPHILIZED, FOR SOLUTION INTRAVENOUS at 20:11

## 2017-01-01 RX ADMIN — TERAZOSIN HYDROCHLORIDE ANHYDROUS 2 MG: 2 CAPSULE ORAL at 21:31

## 2017-01-01 RX ADMIN — LACTULOSE 10 G: 10 SOLUTION ORAL at 12:00

## 2017-01-01 RX ADMIN — TERAZOSIN HYDROCHLORIDE ANHYDROUS 5 MG: 5 CAPSULE ORAL at 21:58

## 2017-01-01 RX ADMIN — TERAZOSIN HYDROCHLORIDE ANHYDROUS 5 MG: 5 CAPSULE ORAL at 20:56

## 2017-01-01 RX ADMIN — LORAZEPAM 2 MG: 2 INJECTION INTRAMUSCULAR; INTRAVENOUS at 23:04

## 2017-01-01 RX ADMIN — HYDRALAZINE HYDROCHLORIDE 25 MG: 25 TABLET ORAL at 13:33

## 2017-01-01 RX ADMIN — CARVEDILOL 25 MG: 12.5 TABLET, FILM COATED ORAL at 08:55

## 2017-01-01 RX ADMIN — HYDRALAZINE HYDROCHLORIDE 50 MG: 50 TABLET ORAL at 13:51

## 2017-01-01 RX ADMIN — CARVEDILOL 25 MG: 12.5 TABLET, FILM COATED ORAL at 09:47

## 2017-01-01 RX ADMIN — HYDRALAZINE HYDROCHLORIDE 75 MG: 25 TABLET ORAL at 05:12

## 2017-01-01 RX ADMIN — HEPARIN SODIUM 5000 UNITS: 5000 INJECTION, SOLUTION INTRAVENOUS; SUBCUTANEOUS at 15:22

## 2017-01-01 RX ADMIN — INSULIN HUMAN 2 UNITS: 100 INJECTION, SOLUTION PARENTERAL at 12:18

## 2017-01-01 RX ADMIN — MICONAZOLE NITRATE: 2 CREAM TOPICAL at 08:08

## 2017-01-01 RX ADMIN — TERAZOSIN HYDROCHLORIDE ANHYDROUS 5 MG: 5 CAPSULE ORAL at 21:59

## 2017-01-01 RX ADMIN — MICONAZOLE NITRATE: 20 POWDER TOPICAL at 20:39

## 2017-01-01 RX ADMIN — HYDROMORPHONE HYDROCHLORIDE 0.25 MG: 1 INJECTION, SOLUTION INTRAMUSCULAR; INTRAVENOUS; SUBCUTANEOUS at 07:37

## 2017-01-01 RX ADMIN — FOLIC ACID 1 MG: 5 INJECTION, SOLUTION INTRAMUSCULAR; INTRAVENOUS; SUBCUTANEOUS at 08:57

## 2017-01-01 RX ADMIN — POTASSIUM CHLORIDE 40 MEQ: 1.5 POWDER, FOR SOLUTION ORAL at 12:18

## 2017-01-01 RX ADMIN — MICONAZOLE NITRATE: 20 POWDER TOPICAL at 09:09

## 2017-01-01 RX ADMIN — HEPARIN SODIUM 5000 UNITS: 5000 INJECTION, SOLUTION INTRAVENOUS; SUBCUTANEOUS at 20:01

## 2017-01-01 RX ADMIN — QUETIAPINE FUMARATE 12.5 MG: 25 TABLET, FILM COATED ORAL at 22:14

## 2017-01-01 RX ADMIN — HYDRALAZINE HYDROCHLORIDE 75 MG: 25 TABLET ORAL at 05:26

## 2017-01-01 RX ADMIN — MICONAZOLE NITRATE: 2 CREAM TOPICAL at 20:46

## 2017-01-01 RX ADMIN — HYDRALAZINE HYDROCHLORIDE 50 MG: 50 TABLET ORAL at 14:24

## 2017-01-01 RX ADMIN — HEPARIN SODIUM 5000 UNITS: 5000 INJECTION, SOLUTION INTRAVENOUS; SUBCUTANEOUS at 21:36

## 2017-01-01 RX ADMIN — DIAZEPAM 5 MG: 5 INJECTION, SOLUTION INTRAMUSCULAR; INTRAVENOUS at 23:07

## 2017-01-01 RX ADMIN — CARVEDILOL 25 MG: 12.5 TABLET, FILM COATED ORAL at 20:45

## 2017-01-01 RX ADMIN — HEPARIN SODIUM 5000 UNITS: 5000 INJECTION, SOLUTION INTRAVENOUS; SUBCUTANEOUS at 09:26

## 2017-01-01 RX ADMIN — INSULIN LISPRO 2 UNITS: 100 INJECTION, SOLUTION INTRAVENOUS; SUBCUTANEOUS at 17:26

## 2017-01-01 RX ADMIN — LACTULOSE 10 G: 10 SOLUTION ORAL at 18:35

## 2017-01-01 RX ADMIN — ATORVASTATIN CALCIUM 10 MG: 10 TABLET, FILM COATED ORAL at 21:43

## 2017-01-01 RX ADMIN — NITROGLYCERIN 100 MCG/MIN: 20 INJECTION INTRAVENOUS at 09:38

## 2017-01-01 RX ADMIN — CARVEDILOL 25 MG: 12.5 TABLET, FILM COATED ORAL at 09:00

## 2017-01-01 RX ADMIN — QUETIAPINE FUMARATE 12.5 MG: 25 TABLET, FILM COATED ORAL at 09:05

## 2017-01-01 RX ADMIN — IPRATROPIUM BROMIDE AND ALBUTEROL SULFATE 3 ML: .5; 3 SOLUTION RESPIRATORY (INHALATION) at 11:40

## 2017-01-01 RX ADMIN — MICONAZOLE NITRATE 1 APPLICATION: 20 POWDER TOPICAL at 08:01

## 2017-01-01 RX ADMIN — MICONAZOLE NITRATE: 2 CREAM TOPICAL at 09:55

## 2017-01-01 RX ADMIN — CARVEDILOL 25 MG: 12.5 TABLET, FILM COATED ORAL at 10:11

## 2017-01-01 RX ADMIN — Medication: at 13:58

## 2017-01-01 RX ADMIN — DOXYCYCLINE 100 MG: 100 INJECTION, POWDER, LYOPHILIZED, FOR SOLUTION INTRAVENOUS at 20:10

## 2017-01-01 RX ADMIN — MICONAZOLE NITRATE: 20 POWDER TOPICAL at 21:57

## 2017-01-01 RX ADMIN — FAMOTIDINE 20 MG: 10 INJECTION, SOLUTION INTRAVENOUS at 08:10

## 2017-01-01 RX ADMIN — AMLODIPINE BESYLATE 5 MG: 5 TABLET ORAL at 08:14

## 2017-01-01 RX ADMIN — HEPARIN SODIUM 5000 UNITS: 5000 INJECTION, SOLUTION INTRAVENOUS; SUBCUTANEOUS at 08:59

## 2017-01-01 RX ADMIN — ACETAMINOPHEN 650 MG: 325 TABLET, FILM COATED ORAL at 21:19

## 2017-01-01 RX ADMIN — FOLIC ACID 1 MG: 5 INJECTION, SOLUTION INTRAMUSCULAR; INTRAVENOUS; SUBCUTANEOUS at 08:33

## 2017-01-01 RX ADMIN — POLYETHYLENE GLYCOL 3350 17 G: 17 POWDER, FOR SOLUTION ORAL at 08:59

## 2017-01-01 RX ADMIN — LACTULOSE 10 G: 10 SOLUTION ORAL at 17:33

## 2017-01-01 RX ADMIN — QUETIAPINE FUMARATE 12.5 MG: 25 TABLET ORAL at 08:29

## 2017-01-01 RX ADMIN — THIAMINE HYDROCHLORIDE 500 MG: 100 INJECTION, SOLUTION INTRAMUSCULAR; INTRAVENOUS at 05:06

## 2017-01-01 RX ADMIN — IPRATROPIUM BROMIDE AND ALBUTEROL SULFATE 3 ML: .5; 3 SOLUTION RESPIRATORY (INHALATION) at 19:40

## 2017-01-01 RX ADMIN — TERAZOSIN HYDROCHLORIDE ANHYDROUS 5 MG: 5 CAPSULE ORAL at 22:36

## 2017-01-01 RX ADMIN — QUETIAPINE FUMARATE 12.5 MG: 25 TABLET, FILM COATED ORAL at 21:16

## 2017-01-01 RX ADMIN — BUMETANIDE 2.5 MG: 0.25 INJECTION, SOLUTION INTRAMUSCULAR; INTRAVENOUS at 20:33

## 2017-01-01 RX ADMIN — LACTULOSE 10 G: 10 SOLUTION ORAL at 08:39

## 2017-01-01 RX ADMIN — HEPARIN SODIUM 5000 UNITS: 5000 INJECTION, SOLUTION INTRAVENOUS; SUBCUTANEOUS at 09:04

## 2017-01-01 RX ADMIN — HEPARIN SODIUM 5000 UNITS: 5000 INJECTION, SOLUTION INTRAVENOUS; SUBCUTANEOUS at 21:38

## 2017-01-01 RX ADMIN — HEPARIN SODIUM 5000 UNITS: 5000 INJECTION, SOLUTION INTRAVENOUS; SUBCUTANEOUS at 09:21

## 2017-01-01 RX ADMIN — FUROSEMIDE 20 MG: 20 TABLET ORAL at 08:55

## 2017-01-01 RX ADMIN — LORAZEPAM 1 MG: 2 INJECTION INTRAMUSCULAR; INTRAVENOUS at 02:14

## 2017-01-01 RX ADMIN — POTASSIUM CHLORIDE AND DEXTROSE MONOHYDRATE 75 ML/HR: 150; 5 INJECTION, SOLUTION INTRAVENOUS at 17:04

## 2017-01-01 RX ADMIN — LORAZEPAM 0.5 MG: 2 INJECTION INTRAMUSCULAR; INTRAVENOUS at 13:57

## 2017-01-01 RX ADMIN — DOXYCYCLINE 100 MG: 100 INJECTION, POWDER, LYOPHILIZED, FOR SOLUTION INTRAVENOUS at 20:52

## 2017-01-01 RX ADMIN — THIAMINE HYDROCHLORIDE 500 MG: 100 INJECTION, SOLUTION INTRAMUSCULAR; INTRAVENOUS at 21:31

## 2017-01-01 RX ADMIN — LACTULOSE 10 G: 10 SOLUTION ORAL at 17:21

## 2017-01-01 RX ADMIN — DOCUSATE SODIUM 150 MG: 50 LIQUID ORAL at 08:17

## 2017-01-01 RX ADMIN — QUETIAPINE FUMARATE 25 MG: 25 TABLET, FILM COATED ORAL at 20:41

## 2017-01-01 RX ADMIN — AMLODIPINE BESYLATE 5 MG: 5 TABLET ORAL at 17:29

## 2017-01-01 RX ADMIN — HYDRALAZINE HYDROCHLORIDE 75 MG: 25 TABLET ORAL at 21:28

## 2017-01-01 RX ADMIN — CARVEDILOL 25 MG: 12.5 TABLET, FILM COATED ORAL at 08:09

## 2017-01-01 RX ADMIN — MICONAZOLE NITRATE: 2 CREAM TOPICAL at 21:03

## 2017-01-01 RX ADMIN — CARVEDILOL 25 MG: 12.5 TABLET, FILM COATED ORAL at 20:37

## 2017-01-01 RX ADMIN — MICONAZOLE NITRATE: 2 CREAM TOPICAL at 21:57

## 2017-01-01 RX ADMIN — DOCUSATE SODIUM 150 MG: 50 LIQUID ORAL at 08:09

## 2017-01-01 RX ADMIN — MICONAZOLE NITRATE: 20 POWDER TOPICAL at 21:26

## 2017-01-01 RX ADMIN — NITROGLYCERIN 5 MCG/MIN: 20 INJECTION INTRAVENOUS at 22:05

## 2017-01-01 RX ADMIN — ROBINUL 0.2 MG: 0.2 INJECTION INTRAMUSCULAR; INTRAVENOUS at 23:54

## 2017-01-01 RX ADMIN — CEFTRIAXONE SODIUM 1 G: 1 INJECTION, SOLUTION INTRAVENOUS at 19:51

## 2017-01-01 RX ADMIN — TERAZOSIN HYDROCHLORIDE ANHYDROUS 5 MG: 5 CAPSULE ORAL at 08:33

## 2017-01-01 RX ADMIN — QUETIAPINE FUMARATE 12.5 MG: 25 TABLET, FILM COATED ORAL at 14:37

## 2017-01-01 RX ADMIN — Medication 2 TABLET: at 21:33

## 2017-01-01 RX ADMIN — Medication 2 TABLET: at 23:05

## 2017-01-01 RX ADMIN — HYDRALAZINE HYDROCHLORIDE 50 MG: 50 TABLET ORAL at 15:00

## 2017-01-01 RX ADMIN — POLYETHYLENE GLYCOL 3350 17 G: 17 POWDER, FOR SOLUTION ORAL at 09:49

## 2017-01-01 RX ADMIN — TERAZOSIN HYDROCHLORIDE ANHYDROUS 2 MG: 2 CAPSULE ORAL at 12:12

## 2017-01-01 RX ADMIN — HEPARIN SODIUM 5000 UNITS: 5000 INJECTION, SOLUTION INTRAVENOUS; SUBCUTANEOUS at 08:06

## 2017-01-01 RX ADMIN — ATORVASTATIN CALCIUM 10 MG: 10 TABLET, FILM COATED ORAL at 20:38

## 2017-01-01 RX ADMIN — LORAZEPAM 1 MG: 2 INJECTION INTRAMUSCULAR; INTRAVENOUS at 05:08

## 2017-01-01 RX ADMIN — MICONAZOLE NITRATE: 20 POWDER TOPICAL at 09:55

## 2017-01-01 RX ADMIN — MICONAZOLE NITRATE: 2 CREAM TOPICAL at 21:33

## 2017-01-01 RX ADMIN — QUETIAPINE FUMARATE 25 MG: 25 TABLET, FILM COATED ORAL at 21:21

## 2017-01-01 RX ADMIN — FUROSEMIDE 20 MG: 10 INJECTION, SOLUTION INTRAMUSCULAR; INTRAVENOUS at 18:35

## 2017-01-01 RX ADMIN — ROBINUL 0.2 MG: 0.2 INJECTION INTRAMUSCULAR; INTRAVENOUS at 19:13

## 2017-01-01 RX ADMIN — FUROSEMIDE 20 MG: 10 INJECTION, SOLUTION INTRAMUSCULAR; INTRAVENOUS at 17:33

## 2017-01-01 RX ADMIN — DEXTROSE AND SODIUM CHLORIDE 125 ML/HR: 5; 450 INJECTION, SOLUTION INTRAVENOUS at 13:27

## 2017-01-01 RX ADMIN — TERAZOSIN HYDROCHLORIDE ANHYDROUS 2 MG: 2 CAPSULE ORAL at 10:38

## 2017-01-01 RX ADMIN — MICONAZOLE NITRATE: 20 POWDER TOPICAL at 20:42

## 2017-01-01 RX ADMIN — CASTOR OIL AND BALSAM, PERU 1 APPLICATION: 788; 87 OINTMENT TOPICAL at 02:26

## 2017-01-01 RX ADMIN — MICONAZOLE NITRATE: 2 CREAM TOPICAL at 09:13

## 2017-01-01 RX ADMIN — HEPARIN SODIUM 5000 UNITS: 5000 INJECTION, SOLUTION INTRAVENOUS; SUBCUTANEOUS at 09:51

## 2017-01-01 RX ADMIN — MICONAZOLE NITRATE: 2 CREAM TOPICAL at 10:23

## 2017-01-01 RX ADMIN — QUETIAPINE FUMARATE 12.5 MG: 25 TABLET, FILM COATED ORAL at 21:00

## 2017-01-01 RX ADMIN — AMLODIPINE BESYLATE 5 MG: 5 TABLET ORAL at 09:18

## 2017-01-01 RX ADMIN — TERAZOSIN HYDROCHLORIDE ANHYDROUS 5 MG: 5 CAPSULE ORAL at 21:39

## 2017-01-01 RX ADMIN — TERAZOSIN HYDROCHLORIDE ANHYDROUS 2 MG: 2 CAPSULE ORAL at 20:38

## 2017-01-01 RX ADMIN — HYDRALAZINE HYDROCHLORIDE 75 MG: 25 TABLET ORAL at 21:13

## 2017-01-01 RX ADMIN — Medication 1 PACKET: at 10:07

## 2017-01-01 RX ADMIN — THIAMINE HYDROCHLORIDE 100 MG: 100 INJECTION, SOLUTION INTRAMUSCULAR; INTRAVENOUS at 08:33

## 2017-01-01 RX ADMIN — CEFTRIAXONE SODIUM 1 G: 1 INJECTION, SOLUTION INTRAVENOUS at 20:35

## 2017-01-01 RX ADMIN — DOXYCYCLINE 100 MG: 100 INJECTION, POWDER, LYOPHILIZED, FOR SOLUTION INTRAVENOUS at 05:43

## 2017-01-01 RX ADMIN — MICONAZOLE NITRATE: 2 CREAM TOPICAL at 21:16

## 2017-01-01 RX ADMIN — ALBUMIN HUMAN 12.5 G: 0.25 SOLUTION INTRAVENOUS at 16:20

## 2017-01-01 RX ADMIN — TERAZOSIN HYDROCHLORIDE ANHYDROUS 5 MG: 5 CAPSULE ORAL at 21:21

## 2017-01-01 RX ADMIN — CARVEDILOL 25 MG: 12.5 TABLET, FILM COATED ORAL at 21:28

## 2017-01-01 RX ADMIN — HYDRALAZINE HYDROCHLORIDE 75 MG: 25 TABLET ORAL at 20:16

## 2017-01-01 RX ADMIN — Medication: at 21:29

## 2017-01-01 RX ADMIN — MICONAZOLE NITRATE: 20 POWDER TOPICAL at 21:03

## 2017-01-01 RX ADMIN — FUROSEMIDE 40 MG: 40 TABLET ORAL at 09:10

## 2017-01-01 RX ADMIN — ATORVASTATIN CALCIUM 10 MG: 10 TABLET, FILM COATED ORAL at 20:46

## 2017-01-01 RX ADMIN — FUROSEMIDE 20 MG: 10 INJECTION, SOLUTION INTRAMUSCULAR; INTRAVENOUS at 17:59

## 2017-01-01 RX ADMIN — HYDROMORPHONE HYDROCHLORIDE 0.25 MG: 1 INJECTION, SOLUTION INTRAMUSCULAR; INTRAVENOUS; SUBCUTANEOUS at 01:55

## 2017-01-01 RX ADMIN — LACTULOSE 10 G: 10 SOLUTION ORAL at 09:33

## 2017-01-01 RX ADMIN — NICARDIPINE HYDROCHLORIDE 5 MG/HR: 25 INJECTION INTRAVENOUS at 23:38

## 2017-01-01 RX ADMIN — HYDRALAZINE HYDROCHLORIDE 50 MG: 50 TABLET ORAL at 22:13

## 2017-01-01 RX ADMIN — MICONAZOLE NITRATE 1 APPLICATION: 2 CREAM TOPICAL at 08:15

## 2017-01-01 RX ADMIN — DOCUSATE SODIUM 150 MG: 50 LIQUID ORAL at 09:17

## 2017-01-01 RX ADMIN — LORAZEPAM 1 MG: 2 INJECTION INTRAMUSCULAR; INTRAVENOUS at 01:21

## 2017-01-01 RX ADMIN — CASTOR OIL AND BALSAM, PERU: 788; 87 OINTMENT TOPICAL at 20:02

## 2017-01-01 RX ADMIN — QUETIAPINE FUMARATE 12.5 MG: 25 TABLET, FILM COATED ORAL at 01:41

## 2017-01-01 RX ADMIN — CARVEDILOL 25 MG: 12.5 TABLET, FILM COATED ORAL at 21:34

## 2017-01-01 RX ADMIN — CARVEDILOL 25 MG: 12.5 TABLET, FILM COATED ORAL at 22:23

## 2017-01-01 RX ADMIN — HALOPERIDOL LACTATE 1 MG: 5 INJECTION, SOLUTION INTRAMUSCULAR at 15:08

## 2017-01-01 RX ADMIN — CASTOR OIL AND BALSAM, PERU: 788; 87 OINTMENT TOPICAL at 21:03

## 2017-01-01 RX ADMIN — DEXTROSE MONOHYDRATE 100 ML/HR: 50 INJECTION, SOLUTION INTRAVENOUS at 09:01

## 2017-01-01 RX ADMIN — QUETIAPINE FUMARATE 25 MG: 25 TABLET, FILM COATED ORAL at 21:59

## 2017-01-01 RX ADMIN — LACTULOSE 10 G: 10 SOLUTION ORAL at 11:47

## 2017-01-01 RX ADMIN — Medication 2000 MG: at 15:57

## 2017-01-01 RX ADMIN — Medication 1 PACKET: at 17:36

## 2017-01-01 RX ADMIN — ATORVASTATIN CALCIUM 10 MG: 10 TABLET, FILM COATED ORAL at 21:24

## 2017-01-01 RX ADMIN — CARVEDILOL 25 MG: 12.5 TABLET, FILM COATED ORAL at 20:49

## 2017-01-01 RX ADMIN — HYDRALAZINE HYDROCHLORIDE 50 MG: 50 TABLET ORAL at 21:54

## 2017-01-01 RX ADMIN — DOXYCYCLINE 100 MG: 100 INJECTION, POWDER, LYOPHILIZED, FOR SOLUTION INTRAVENOUS at 17:11

## 2017-01-01 RX ADMIN — MICONAZOLE NITRATE: 2 CREAM TOPICAL at 21:29

## 2017-01-01 RX ADMIN — POTASSIUM CHLORIDE AND DEXTROSE MONOHYDRATE 75 ML/HR: 150; 5 INJECTION, SOLUTION INTRAVENOUS at 09:03

## 2017-01-01 RX ADMIN — NICARDIPINE HYDROCHLORIDE 2.5 MG/HR: 25 INJECTION INTRAVENOUS at 19:44

## 2017-01-01 RX ADMIN — HYDRALAZINE HYDROCHLORIDE 50 MG: 50 TABLET ORAL at 05:51

## 2017-01-01 RX ADMIN — HYDRALAZINE HYDROCHLORIDE 50 MG: 50 TABLET ORAL at 20:35

## 2017-01-01 RX ADMIN — Medication: at 08:44

## 2017-01-01 RX ADMIN — Medication: at 14:37

## 2017-01-01 RX ADMIN — LACTULOSE 10 G: 10 SOLUTION ORAL at 08:50

## 2017-01-01 RX ADMIN — QUETIAPINE FUMARATE 25 MG: 25 TABLET ORAL at 08:32

## 2017-01-01 RX ADMIN — POLYETHYLENE GLYCOL 3350 17 G: 17 POWDER, FOR SOLUTION ORAL at 08:45

## 2017-01-01 RX ADMIN — Medication 10 ML: at 20:46

## 2017-01-01 RX ADMIN — IPRATROPIUM BROMIDE AND ALBUTEROL SULFATE 3 ML: .5; 3 SOLUTION RESPIRATORY (INHALATION) at 20:00

## 2017-01-01 RX ADMIN — IPRATROPIUM BROMIDE AND ALBUTEROL SULFATE 3 ML: .5; 3 SOLUTION RESPIRATORY (INHALATION) at 16:02

## 2017-01-01 RX ADMIN — TERAZOSIN HYDROCHLORIDE ANHYDROUS 2 MG: 2 CAPSULE ORAL at 09:50

## 2017-01-01 RX ADMIN — DOCUSATE SODIUM 150 MG: 50 LIQUID ORAL at 09:49

## 2017-01-01 RX ADMIN — SODIUM BICARBONATE 650 MG TABLET 1300 MG: at 17:31

## 2017-01-01 RX ADMIN — MICONAZOLE NITRATE: 2 CREAM TOPICAL at 09:39

## 2017-01-01 RX ADMIN — DOXYCYCLINE 100 MG: 100 INJECTION, POWDER, LYOPHILIZED, FOR SOLUTION INTRAVENOUS at 08:00

## 2017-01-01 RX ADMIN — SODIUM BICARBONATE 650 MG TABLET 1300 MG: at 17:34

## 2017-01-01 RX ADMIN — HEPARIN SODIUM 5000 UNITS: 5000 INJECTION, SOLUTION INTRAVENOUS; SUBCUTANEOUS at 21:23

## 2017-01-01 RX ADMIN — HYDRALAZINE HYDROCHLORIDE 75 MG: 25 TABLET ORAL at 21:19

## 2017-01-01 RX ADMIN — HYDRALAZINE HYDROCHLORIDE 75 MG: 25 TABLET ORAL at 16:08

## 2017-01-01 RX ADMIN — DIAZEPAM 5 MG: 5 INJECTION, SOLUTION INTRAMUSCULAR; INTRAVENOUS at 17:17

## 2017-01-01 RX ADMIN — HEPARIN SODIUM 5000 UNITS: 5000 INJECTION, SOLUTION INTRAVENOUS; SUBCUTANEOUS at 21:03

## 2017-01-01 RX ADMIN — MICONAZOLE NITRATE: 2 CREAM TOPICAL at 09:44

## 2017-01-01 RX ADMIN — HEPARIN SODIUM 5000 UNITS: 5000 INJECTION, SOLUTION INTRAVENOUS; SUBCUTANEOUS at 13:57

## 2017-01-01 RX ADMIN — QUETIAPINE FUMARATE 12.5 MG: 25 TABLET, FILM COATED ORAL at 21:28

## 2017-01-01 RX ADMIN — Medication 10 ML: at 21:33

## 2017-01-01 RX ADMIN — Medication: at 21:03

## 2017-01-01 RX ADMIN — ACETAMINOPHEN 650 MG: 325 TABLET, FILM COATED ORAL at 13:59

## 2017-01-01 RX ADMIN — FUROSEMIDE 40 MG: 40 TABLET ORAL at 17:11

## 2017-01-01 RX ADMIN — HYDRALAZINE HYDROCHLORIDE 50 MG: 50 TABLET ORAL at 15:58

## 2017-01-01 RX ADMIN — MICONAZOLE NITRATE: 20 POWDER TOPICAL at 10:23

## 2017-01-01 RX ADMIN — TERAZOSIN HYDROCHLORIDE ANHYDROUS 5 MG: 5 CAPSULE ORAL at 20:19

## 2017-01-01 RX ADMIN — POLYETHYLENE GLYCOL 3350 17 G: 17 POWDER, FOR SOLUTION ORAL at 08:12

## 2017-01-01 RX ADMIN — CASTOR OIL AND BALSAM, PERU: 788; 87 OINTMENT TOPICAL at 21:09

## 2017-01-01 RX ADMIN — MICONAZOLE NITRATE: 20 POWDER TOPICAL at 09:22

## 2017-01-01 RX ADMIN — CARVEDILOL 25 MG: 12.5 TABLET, FILM COATED ORAL at 22:48

## 2017-01-01 RX ADMIN — LACTULOSE 10 G: 10 SOLUTION ORAL at 18:39

## 2017-01-01 RX ADMIN — TERAZOSIN HYDROCHLORIDE ANHYDROUS 2 MG: 2 CAPSULE ORAL at 10:40

## 2017-01-01 RX ADMIN — HEPARIN SODIUM 5000 UNITS: 5000 INJECTION, SOLUTION INTRAVENOUS; SUBCUTANEOUS at 22:14

## 2017-01-01 RX ADMIN — AMLODIPINE BESYLATE 5 MG: 5 TABLET ORAL at 17:23

## 2017-01-01 RX ADMIN — HEPARIN SODIUM 5000 UNITS: 5000 INJECTION, SOLUTION INTRAVENOUS; SUBCUTANEOUS at 09:43

## 2017-01-01 RX ADMIN — DOXYCYCLINE 100 MG: 100 INJECTION, POWDER, LYOPHILIZED, FOR SOLUTION INTRAVENOUS at 05:00

## 2017-01-01 RX ADMIN — Medication 2 TABLET: at 21:54

## 2017-01-01 RX ADMIN — NICARDIPINE HYDROCHLORIDE 5 MG/HR: 25 INJECTION INTRAVENOUS at 09:37

## 2017-01-01 RX ADMIN — QUETIAPINE FUMARATE 12.5 MG: 25 TABLET, FILM COATED ORAL at 06:21

## 2017-01-01 RX ADMIN — CARVEDILOL 25 MG: 12.5 TABLET, FILM COATED ORAL at 08:15

## 2017-01-01 RX ADMIN — Medication: at 09:00

## 2017-01-01 RX ADMIN — NICARDIPINE HYDROCHLORIDE 5 MG/HR: 25 INJECTION INTRAVENOUS at 19:57

## 2017-01-01 RX ADMIN — MICONAZOLE NITRATE 1 APPLICATION: 2 CREAM TOPICAL at 21:43

## 2017-01-01 RX ADMIN — LACTULOSE 10 G: 10 SOLUTION ORAL at 09:30

## 2017-01-01 RX ADMIN — Medication 1 PACKET: at 15:06

## 2017-01-01 RX ADMIN — HEPARIN SODIUM 5000 UNITS: 5000 INJECTION, SOLUTION INTRAVENOUS; SUBCUTANEOUS at 21:14

## 2017-01-01 RX ADMIN — NICARDIPINE HYDROCHLORIDE 5 MG/HR: 25 INJECTION INTRAVENOUS at 03:24

## 2017-01-01 RX ADMIN — ATORVASTATIN CALCIUM 10 MG: 10 TABLET, FILM COATED ORAL at 22:48

## 2017-01-01 RX ADMIN — FUROSEMIDE 20 MG: 10 INJECTION, SOLUTION INTRAMUSCULAR; INTRAVENOUS at 22:04

## 2017-01-01 RX ADMIN — TERAZOSIN HYDROCHLORIDE ANHYDROUS 5 MG: 5 CAPSULE ORAL at 21:02

## 2017-01-01 RX ADMIN — HYDRALAZINE HYDROCHLORIDE 75 MG: 25 TABLET ORAL at 06:21

## 2017-01-01 RX ADMIN — CARVEDILOL 25 MG: 12.5 TABLET, FILM COATED ORAL at 11:46

## 2017-01-01 RX ADMIN — SODIUM BICARBONATE 650 MG TABLET 1300 MG: at 08:46

## 2017-01-01 RX ADMIN — CARVEDILOL 25 MG: 12.5 TABLET, FILM COATED ORAL at 21:58

## 2017-01-01 RX ADMIN — DOXYCYCLINE 100 MG: 100 INJECTION, POWDER, LYOPHILIZED, FOR SOLUTION INTRAVENOUS at 05:52

## 2017-01-01 RX ADMIN — MICONAZOLE NITRATE: 20 POWDER TOPICAL at 15:11

## 2017-01-01 RX ADMIN — MICONAZOLE NITRATE: 2 CREAM TOPICAL at 10:11

## 2017-01-01 RX ADMIN — POLYETHYLENE GLYCOL 3350 17 G: 17 POWDER, FOR SOLUTION ORAL at 10:36

## 2017-01-01 RX ADMIN — HEPARIN SODIUM 5000 UNITS: 5000 INJECTION, SOLUTION INTRAVENOUS; SUBCUTANEOUS at 20:46

## 2017-01-01 RX ADMIN — HEPARIN SODIUM 5000 UNITS: 5000 INJECTION, SOLUTION INTRAVENOUS; SUBCUTANEOUS at 14:22

## 2017-01-01 RX ADMIN — CARVEDILOL 25 MG: 12.5 TABLET, FILM COATED ORAL at 09:26

## 2017-01-01 RX ADMIN — NICARDIPINE HYDROCHLORIDE 5 MG/HR: 25 INJECTION INTRAVENOUS at 22:57

## 2017-01-01 RX ADMIN — DOCUSATE SODIUM 100 MG: 100 CAPSULE, LIQUID FILLED ORAL at 17:33

## 2017-01-01 RX ADMIN — ALBUTEROL SULFATE 2.5 MG: 2.5 SOLUTION RESPIRATORY (INHALATION) at 12:46

## 2017-01-01 RX ADMIN — QUETIAPINE FUMARATE 12.5 MG: 25 TABLET ORAL at 17:18

## 2017-01-01 RX ADMIN — AMLODIPINE BESYLATE 5 MG: 5 TABLET ORAL at 10:10

## 2017-01-01 RX ADMIN — HYDRALAZINE HYDROCHLORIDE 75 MG: 25 TABLET ORAL at 15:06

## 2017-01-01 RX ADMIN — FUROSEMIDE 60 MG: 10 INJECTION, SOLUTION INTRAMUSCULAR; INTRAVENOUS at 20:51

## 2017-01-01 RX ADMIN — QUETIAPINE FUMARATE 25 MG: 25 TABLET, FILM COATED ORAL at 20:52

## 2017-01-01 RX ADMIN — QUETIAPINE FUMARATE 12.5 MG: 25 TABLET, FILM COATED ORAL at 13:38

## 2017-01-01 RX ADMIN — QUETIAPINE FUMARATE 25 MG: 25 TABLET ORAL at 20:50

## 2017-01-01 RX ADMIN — DOXYCYCLINE 100 MG: 100 INJECTION, POWDER, LYOPHILIZED, FOR SOLUTION INTRAVENOUS at 08:33

## 2017-01-01 RX ADMIN — FUROSEMIDE 20 MG: 20 TABLET ORAL at 18:39

## 2017-01-01 RX ADMIN — FOLIC ACID 1 MG: 5 INJECTION, SOLUTION INTRAMUSCULAR; INTRAVENOUS; SUBCUTANEOUS at 09:05

## 2017-01-01 RX ADMIN — FAMOTIDINE 20 MG: 10 INJECTION, SOLUTION INTRAVENOUS at 09:43

## 2017-01-01 RX ADMIN — FUROSEMIDE 60 MG: 10 INJECTION, SOLUTION INTRAMUSCULAR; INTRAVENOUS at 01:53

## 2017-01-01 RX ADMIN — Medication 1 PACKET: at 08:29

## 2017-01-01 RX ADMIN — HEPARIN SODIUM 5000 UNITS: 5000 INJECTION, SOLUTION INTRAVENOUS; SUBCUTANEOUS at 09:34

## 2017-01-01 RX ADMIN — AMLODIPINE BESYLATE 5 MG: 5 TABLET ORAL at 17:21

## 2017-01-01 RX ADMIN — HEPARIN SODIUM 5000 UNITS: 5000 INJECTION, SOLUTION INTRAVENOUS; SUBCUTANEOUS at 20:38

## 2017-01-01 RX ADMIN — AMLODIPINE BESYLATE 5 MG: 5 TABLET ORAL at 17:36

## 2017-01-01 RX ADMIN — FAMOTIDINE 20 MG: 10 INJECTION, SOLUTION INTRAVENOUS at 09:09

## 2017-01-01 RX ADMIN — AMLODIPINE BESYLATE 5 MG: 5 TABLET ORAL at 09:45

## 2017-01-01 RX ADMIN — HEPARIN SODIUM 5000 UNITS: 5000 INJECTION, SOLUTION INTRAVENOUS; SUBCUTANEOUS at 10:36

## 2017-01-01 RX ADMIN — CARVEDILOL 25 MG: 12.5 TABLET, FILM COATED ORAL at 09:34

## 2017-01-01 RX ADMIN — CARVEDILOL 25 MG: 12.5 TABLET, FILM COATED ORAL at 08:05

## 2017-01-01 RX ADMIN — TERAZOSIN HYDROCHLORIDE ANHYDROUS 5 MG: 5 CAPSULE ORAL at 21:20

## 2017-01-01 RX ADMIN — MICONAZOLE NITRATE: 20 POWDER TOPICAL at 21:06

## 2017-01-01 RX ADMIN — Medication: at 20:46

## 2017-01-01 RX ADMIN — CARVEDILOL 25 MG: 12.5 TABLET, FILM COATED ORAL at 20:56

## 2017-01-01 RX ADMIN — HEPARIN SODIUM 5000 UNITS: 5000 INJECTION, SOLUTION INTRAVENOUS; SUBCUTANEOUS at 08:14

## 2017-01-01 RX ADMIN — LACTULOSE 10 G: 10 SOLUTION ORAL at 08:23

## 2017-01-01 RX ADMIN — CASTOR OIL AND BALSAM, PERU: 788; 87 OINTMENT TOPICAL at 10:22

## 2017-01-01 RX ADMIN — ALBUMIN HUMAN 25 G: 0.05 INJECTION, SOLUTION INTRAVENOUS at 15:29

## 2017-01-01 RX ADMIN — Medication 10 ML: at 13:21

## 2017-01-01 RX ADMIN — HEPARIN SODIUM 5000 UNITS: 5000 INJECTION, SOLUTION INTRAVENOUS; SUBCUTANEOUS at 20:35

## 2017-01-01 RX ADMIN — Medication 2 TABLET: at 20:38

## 2017-01-01 RX ADMIN — MICONAZOLE NITRATE: 20 POWDER TOPICAL at 20:51

## 2017-01-01 RX ADMIN — FOLIC ACID 1 MG: 5 INJECTION, SOLUTION INTRAMUSCULAR; INTRAVENOUS; SUBCUTANEOUS at 10:45

## 2017-01-01 RX ADMIN — LORAZEPAM 2 MG: 2 INJECTION INTRAMUSCULAR; INTRAVENOUS at 04:32

## 2017-01-01 RX ADMIN — MICONAZOLE NITRATE: 20 POWDER TOPICAL at 08:27

## 2017-01-01 RX ADMIN — MICONAZOLE NITRATE: 2 CREAM TOPICAL at 08:27

## 2017-01-01 RX ADMIN — CASTOR OIL AND BALSAM, PERU: 788; 87 OINTMENT TOPICAL at 08:32

## 2017-01-01 RX ADMIN — DOXYCYCLINE 100 MG: 100 INJECTION, POWDER, LYOPHILIZED, FOR SOLUTION INTRAVENOUS at 20:06

## 2017-01-01 RX ADMIN — HEPARIN SODIUM 5000 UNITS: 5000 INJECTION, SOLUTION INTRAVENOUS; SUBCUTANEOUS at 21:00

## 2017-01-01 RX ADMIN — POLYETHYLENE GLYCOL 3350 17 G: 17 POWDER, FOR SOLUTION ORAL at 09:14

## 2017-01-01 RX ADMIN — HYDRALAZINE HYDROCHLORIDE 50 MG: 50 TABLET ORAL at 20:51

## 2017-01-01 RX ADMIN — FUROSEMIDE 20 MG: 20 TABLET ORAL at 17:38

## 2017-01-01 RX ADMIN — CARVEDILOL 25 MG: 12.5 TABLET, FILM COATED ORAL at 08:56

## 2017-01-01 RX ADMIN — IPRATROPIUM BROMIDE AND ALBUTEROL SULFATE 3 ML: .5; 3 SOLUTION RESPIRATORY (INHALATION) at 19:57

## 2017-01-01 RX ADMIN — AMLODIPINE BESYLATE 5 MG: 5 TABLET ORAL at 18:35

## 2017-01-01 RX ADMIN — FUROSEMIDE 20 MG: 20 TABLET ORAL at 18:24

## 2017-01-01 RX ADMIN — NICARDIPINE HYDROCHLORIDE 2.5 MG/HR: 25 INJECTION INTRAVENOUS at 08:12

## 2017-01-01 RX ADMIN — AMLODIPINE BESYLATE 5 MG: 5 TABLET ORAL at 08:48

## 2017-01-01 RX ADMIN — CARVEDILOL 25 MG: 12.5 TABLET, FILM COATED ORAL at 08:26

## 2017-01-01 RX ADMIN — IPRATROPIUM BROMIDE AND ALBUTEROL SULFATE 3 ML: .5; 3 SOLUTION RESPIRATORY (INHALATION) at 19:49

## 2017-01-01 RX ADMIN — LACTULOSE 10 G: 10 SOLUTION ORAL at 17:31

## 2017-01-01 RX ADMIN — FUROSEMIDE 20 MG: 20 TABLET ORAL at 08:34

## 2017-01-01 RX ADMIN — QUETIAPINE FUMARATE 25 MG: 25 TABLET ORAL at 08:31

## 2017-01-01 RX ADMIN — FUROSEMIDE 60 MG: 10 INJECTION, SOLUTION INTRAMUSCULAR; INTRAVENOUS at 02:49

## 2017-01-01 RX ADMIN — MICONAZOLE NITRATE: 20 POWDER TOPICAL at 09:44

## 2017-01-01 RX ADMIN — IPRATROPIUM BROMIDE AND ALBUTEROL SULFATE 3 ML: .5; 3 SOLUTION RESPIRATORY (INHALATION) at 07:08

## 2017-01-01 RX ADMIN — NICARDIPINE HYDROCHLORIDE 7.5 MG/HR: 25 INJECTION INTRAVENOUS at 03:47

## 2017-01-01 RX ADMIN — HEPARIN SODIUM 5000 UNITS: 5000 INJECTION, SOLUTION INTRAVENOUS; SUBCUTANEOUS at 22:48

## 2017-01-01 RX ADMIN — SODIUM PHOSPHATE, DIBASIC AND SODIUM PHOSPHATE, MONOBASIC 1 ENEMA: 7; 19 ENEMA RECTAL at 16:32

## 2017-01-01 RX ADMIN — FUROSEMIDE 40 MG: 10 INJECTION, SOLUTION INTRAMUSCULAR; INTRAVENOUS at 04:05

## 2017-01-01 RX ADMIN — CASTOR OIL AND BALSAM, PERU: 788; 87 OINTMENT TOPICAL at 07:38

## 2017-01-01 RX ADMIN — TERAZOSIN HYDROCHLORIDE ANHYDROUS 5 MG: 5 CAPSULE ORAL at 08:27

## 2017-01-01 RX ADMIN — ALBUMIN HUMAN 25 G: 0.05 INJECTION, SOLUTION INTRAVENOUS at 11:04

## 2017-01-01 RX ADMIN — QUETIAPINE FUMARATE 12.5 MG: 25 TABLET, FILM COATED ORAL at 03:44

## 2017-01-01 RX ADMIN — LACTULOSE 10 G: 10 SOLUTION ORAL at 17:39

## 2017-01-01 RX ADMIN — CARVEDILOL 25 MG: 12.5 TABLET, FILM COATED ORAL at 21:43

## 2017-01-01 RX ADMIN — DOCUSATE SODIUM 150 MG: 50 LIQUID ORAL at 09:04

## 2017-01-01 RX ADMIN — MORPHINE SULFATE 1 MG: 4 INJECTION, SOLUTION INTRAMUSCULAR; INTRAVENOUS at 01:53

## 2017-01-01 RX ADMIN — FUROSEMIDE 20 MG: 10 INJECTION, SOLUTION INTRAMUSCULAR; INTRAVENOUS at 08:22

## 2017-01-01 RX ADMIN — TERAZOSIN HYDROCHLORIDE ANHYDROUS 5 MG: 5 CAPSULE ORAL at 09:47

## 2017-01-01 RX ADMIN — CHLOROTHIAZIDE SODIUM 500 MG: 500 INJECTION, POWDER, LYOPHILIZED, FOR SOLUTION INTRAVENOUS at 15:22

## 2017-01-01 RX ADMIN — POLYETHYLENE GLYCOL 3350 17 G: 17 POWDER, FOR SOLUTION ORAL at 09:44

## 2017-01-01 RX ADMIN — MICONAZOLE NITRATE 1 APPLICATION: 20 POWDER TOPICAL at 21:44

## 2017-01-01 RX ADMIN — HEPARIN SODIUM 5000 UNITS: 5000 INJECTION, SOLUTION INTRAVENOUS; SUBCUTANEOUS at 09:17

## 2017-01-01 RX ADMIN — LORAZEPAM 1 MG: 2 INJECTION INTRAMUSCULAR; INTRAVENOUS at 03:55

## 2017-01-01 RX ADMIN — AMLODIPINE BESYLATE 5 MG: 5 TABLET ORAL at 10:01

## 2017-01-01 RX ADMIN — CARVEDILOL 25 MG: 12.5 TABLET, FILM COATED ORAL at 20:52

## 2017-01-01 RX ADMIN — MICONAZOLE NITRATE: 20 POWDER TOPICAL at 21:21

## 2017-01-01 RX ADMIN — ACETAMINOPHEN 650 MG: 325 TABLET, FILM COATED ORAL at 06:19

## 2017-01-01 RX ADMIN — QUETIAPINE FUMARATE 25 MG: 25 TABLET, FILM COATED ORAL at 21:55

## 2017-01-01 RX ADMIN — QUETIAPINE FUMARATE 12.5 MG: 25 TABLET ORAL at 11:10

## 2017-01-01 RX ADMIN — HYDRALAZINE HYDROCHLORIDE 20 MG: 20 INJECTION INTRAMUSCULAR; INTRAVENOUS at 18:43

## 2017-01-01 RX ADMIN — CARVEDILOL 25 MG: 12.5 TABLET, FILM COATED ORAL at 08:59

## 2017-01-01 RX ADMIN — DOXYCYCLINE 100 MG: 100 INJECTION, POWDER, LYOPHILIZED, FOR SOLUTION INTRAVENOUS at 17:29

## 2017-01-01 RX ADMIN — HALOPERIDOL LACTATE 2 MG: 5 INJECTION, SOLUTION INTRAMUSCULAR at 17:31

## 2017-01-01 RX ADMIN — DOXYCYCLINE 100 MG: 100 INJECTION, POWDER, LYOPHILIZED, FOR SOLUTION INTRAVENOUS at 08:11

## 2017-01-01 RX ADMIN — MICONAZOLE NITRATE: 2 CREAM TOPICAL at 20:17

## 2017-01-01 RX ADMIN — TERAZOSIN HYDROCHLORIDE ANHYDROUS 5 MG: 5 CAPSULE ORAL at 20:53

## 2017-01-01 RX ADMIN — HALOPERIDOL LACTATE 2 MG: 5 INJECTION, SOLUTION INTRAMUSCULAR at 03:19

## 2017-01-01 RX ADMIN — MICONAZOLE NITRATE: 20 POWDER TOPICAL at 21:39

## 2017-01-01 RX ADMIN — HALOPERIDOL LACTATE 5 MG: 5 INJECTION, SOLUTION INTRAMUSCULAR at 00:52

## 2017-01-01 RX ADMIN — MICONAZOLE NITRATE: 20 POWDER TOPICAL at 10:11

## 2017-01-01 RX ADMIN — LACTULOSE 10 G: 10 SOLUTION ORAL at 08:57

## 2017-01-01 RX ADMIN — Medication 1 PACKET: at 17:23

## 2017-01-01 RX ADMIN — HALOPERIDOL LACTATE 1 MG: 5 INJECTION, SOLUTION INTRAMUSCULAR at 21:03

## 2017-01-01 RX ADMIN — TERAZOSIN HYDROCHLORIDE ANHYDROUS 5 MG: 5 CAPSULE ORAL at 20:37

## 2017-01-01 RX ADMIN — HEPARIN SODIUM 5000 UNITS: 5000 INJECTION, SOLUTION INTRAVENOUS; SUBCUTANEOUS at 06:11

## 2017-01-01 RX ADMIN — IPRATROPIUM BROMIDE AND ALBUTEROL SULFATE 3 ML: .5; 3 SOLUTION RESPIRATORY (INHALATION) at 07:09

## 2017-01-01 RX ADMIN — AMLODIPINE BESYLATE 5 MG: 5 TABLET ORAL at 08:26

## 2017-01-01 RX ADMIN — Medication 1 PACKET: at 10:24

## 2017-01-01 RX ADMIN — HEPARIN SODIUM 5000 UNITS: 5000 INJECTION, SOLUTION INTRAVENOUS; SUBCUTANEOUS at 05:10

## 2017-01-01 RX ADMIN — FAMOTIDINE 20 MG: 10 INJECTION, SOLUTION INTRAVENOUS at 09:15

## 2017-01-01 RX ADMIN — HALOPERIDOL LACTATE 1 MG: 5 INJECTION, SOLUTION INTRAMUSCULAR at 07:44

## 2017-01-01 RX ADMIN — TERAZOSIN HYDROCHLORIDE ANHYDROUS 5 MG: 5 CAPSULE ORAL at 22:23

## 2017-01-01 RX ADMIN — DOXYCYCLINE 100 MG: 100 INJECTION, POWDER, LYOPHILIZED, FOR SOLUTION INTRAVENOUS at 09:05

## 2017-01-01 RX ADMIN — THIAMINE HYDROCHLORIDE 500 MG: 100 INJECTION, SOLUTION INTRAMUSCULAR; INTRAVENOUS at 05:13

## 2017-01-01 RX ADMIN — ALBUMIN HUMAN 25 G: 0.05 INJECTION, SOLUTION INTRAVENOUS at 21:11

## 2017-01-01 RX ADMIN — IPRATROPIUM BROMIDE AND ALBUTEROL SULFATE 3 ML: .5; 3 SOLUTION RESPIRATORY (INHALATION) at 06:47

## 2017-01-01 RX ADMIN — LACTULOSE 10 G: 10 SOLUTION ORAL at 12:19

## 2017-01-01 RX ADMIN — CARVEDILOL 25 MG: 12.5 TABLET, FILM COATED ORAL at 11:56

## 2017-01-01 RX ADMIN — LACTULOSE 10 G: 10 SOLUTION ORAL at 09:22

## 2017-01-01 RX ADMIN — TERAZOSIN HYDROCHLORIDE ANHYDROUS 5 MG: 5 CAPSULE ORAL at 21:57

## 2017-01-01 RX ADMIN — FUROSEMIDE 20 MG: 20 TABLET ORAL at 09:24

## 2017-01-01 RX ADMIN — HYDRALAZINE HYDROCHLORIDE 50 MG: 50 TABLET ORAL at 08:45

## 2017-01-01 RX ADMIN — HEPARIN SODIUM 5000 UNITS: 5000 INJECTION, SOLUTION INTRAVENOUS; SUBCUTANEOUS at 14:39

## 2017-01-01 RX ADMIN — ROBINUL 0.2 MG: 0.2 INJECTION INTRAMUSCULAR; INTRAVENOUS at 14:59

## 2017-01-01 RX ADMIN — Medication 2 TABLET: at 21:45

## 2017-01-01 RX ADMIN — MICONAZOLE NITRATE 1 APPLICATION: 20 POWDER TOPICAL at 08:16

## 2017-01-01 RX ADMIN — HALOPERIDOL LACTATE 2 MG: 5 INJECTION, SOLUTION INTRAMUSCULAR at 03:27

## 2017-01-01 RX ADMIN — CASTOR OIL AND BALSAM, PERU: 788; 87 OINTMENT TOPICAL at 08:45

## 2017-01-01 RX ADMIN — NICARDIPINE HYDROCHLORIDE 7.5 MG/HR: 25 INJECTION INTRAVENOUS at 07:22

## 2017-01-01 RX ADMIN — FUROSEMIDE 20 MG: 10 INJECTION, SOLUTION INTRAMUSCULAR; INTRAVENOUS at 17:39

## 2017-01-01 RX ADMIN — MICONAZOLE NITRATE: 20 POWDER TOPICAL at 20:17

## 2017-01-01 RX ADMIN — FAMOTIDINE 20 MG: 10 INJECTION, SOLUTION INTRAVENOUS at 08:09

## 2017-01-01 RX ADMIN — Medication: at 17:20

## 2017-01-01 RX ADMIN — MICONAZOLE NITRATE: 2 CREAM TOPICAL at 21:26

## 2017-01-01 RX ADMIN — MICONAZOLE NITRATE: 20 POWDER TOPICAL at 18:03

## 2017-01-01 RX ADMIN — CASTOR OIL AND BALSAM, PERU: 788; 87 OINTMENT TOPICAL at 10:11

## 2017-01-01 RX ADMIN — Medication: at 18:54

## 2017-01-01 RX ADMIN — Medication: at 21:59

## 2017-01-01 RX ADMIN — Medication 1 PACKET: at 08:51

## 2017-01-01 RX ADMIN — HEPARIN SODIUM 5000 UNITS: 5000 INJECTION, SOLUTION INTRAVENOUS; SUBCUTANEOUS at 14:49

## 2017-01-01 RX ADMIN — CEFTRIAXONE SODIUM 1 G: 1 INJECTION, SOLUTION INTRAVENOUS at 20:34

## 2017-01-01 RX ADMIN — Medication 2 TABLET: at 20:51

## 2017-01-01 RX ADMIN — CEFTRIAXONE SODIUM 1 G: 1 INJECTION, SOLUTION INTRAVENOUS at 22:41

## 2017-01-01 RX ADMIN — HYDRALAZINE HYDROCHLORIDE 75 MG: 25 TABLET ORAL at 13:41

## 2017-01-01 RX ADMIN — CASTOR OIL AND BALSAM, PERU: 788; 87 OINTMENT TOPICAL at 21:29

## 2017-01-01 RX ADMIN — AMLODIPINE BESYLATE 5 MG: 5 TABLET ORAL at 17:38

## 2017-01-01 RX ADMIN — CARVEDILOL 25 MG: 12.5 TABLET, FILM COATED ORAL at 10:10

## 2017-01-01 RX ADMIN — LORAZEPAM 1 MG: 2 INJECTION INTRAMUSCULAR; INTRAVENOUS at 10:42

## 2017-01-01 RX ADMIN — QUETIAPINE FUMARATE 12.5 MG: 25 TABLET, FILM COATED ORAL at 00:35

## 2017-01-01 RX ADMIN — Medication: at 12:41

## 2017-01-01 RX ADMIN — AMLODIPINE BESYLATE 5 MG: 5 TABLET ORAL at 09:05

## 2017-01-01 RX ADMIN — FOLIC ACID 1 MG: 5 INJECTION, SOLUTION INTRAMUSCULAR; INTRAVENOUS; SUBCUTANEOUS at 08:14

## 2017-01-01 RX ADMIN — ALBUMIN HUMAN 25 G: 0.05 INJECTION, SOLUTION INTRAVENOUS at 08:54

## 2017-01-01 RX ADMIN — IPRATROPIUM BROMIDE AND ALBUTEROL SULFATE 3 ML: .5; 3 SOLUTION RESPIRATORY (INHALATION) at 19:37

## 2017-01-01 RX ADMIN — QUETIAPINE FUMARATE 12.5 MG: 25 TABLET, FILM COATED ORAL at 23:01

## 2017-01-01 RX ADMIN — DOCUSATE SODIUM 150 MG: 50 LIQUID ORAL at 10:10

## 2017-01-01 RX ADMIN — POTASSIUM CHLORIDE 40 MEQ: 1.5 POWDER, FOR SOLUTION ORAL at 07:02

## 2017-01-01 RX ADMIN — LACTULOSE 10 G: 10 SOLUTION ORAL at 09:53

## 2017-01-01 RX ADMIN — CARVEDILOL 25 MG: 12.5 TABLET, FILM COATED ORAL at 23:58

## 2017-01-01 RX ADMIN — MICONAZOLE NITRATE 1 APPLICATION: 2 CREAM TOPICAL at 09:36

## 2017-01-01 RX ADMIN — HYDRALAZINE HYDROCHLORIDE 75 MG: 25 TABLET ORAL at 13:53

## 2017-01-01 RX ADMIN — TERAZOSIN HYDROCHLORIDE ANHYDROUS 5 MG: 5 CAPSULE ORAL at 08:50

## 2017-01-01 RX ADMIN — HALOPERIDOL LACTATE 1 MG: 5 INJECTION, SOLUTION INTRAMUSCULAR at 12:26

## 2017-01-01 RX ADMIN — ATORVASTATIN CALCIUM 10 MG: 10 TABLET, FILM COATED ORAL at 21:04

## 2017-01-01 RX ADMIN — TERAZOSIN HYDROCHLORIDE ANHYDROUS 5 MG: 5 CAPSULE ORAL at 20:01

## 2017-01-01 RX ADMIN — TERAZOSIN HYDROCHLORIDE ANHYDROUS 2 MG: 2 CAPSULE ORAL at 09:10

## 2017-01-01 RX ADMIN — DOXYCYCLINE HYCLATE 100 MG: 100 CAPSULE ORAL at 17:58

## 2017-08-05 PROBLEM — R00.1 BRADYCARDIA: Status: ACTIVE | Noted: 2017-01-01

## 2017-08-05 PROBLEM — F10.90 CHRONIC ALCOHOL USE: Status: ACTIVE | Noted: 2017-01-01

## 2017-08-05 PROBLEM — I44.2 COMPLETE HEART BLOCK (HCC): Status: ACTIVE | Noted: 2017-01-01

## 2017-08-05 PROBLEM — Z91.199 NON-COMPLIANCE: Status: ACTIVE | Noted: 2017-01-01

## 2017-08-05 PROBLEM — R41.82 ALTERED MENTAL STATUS: Status: ACTIVE | Noted: 2017-01-01

## 2017-08-05 PROBLEM — I67.9 CEREBRAL VASCULAR DISEASE: Status: ACTIVE | Noted: 2017-01-01

## 2017-08-05 PROBLEM — N17.9 AKI (ACUTE KIDNEY INJURY) (HCC): Status: ACTIVE | Noted: 2017-01-01

## 2017-08-05 PROBLEM — I25.10 CAD (CORONARY ARTERY DISEASE): Status: ACTIVE | Noted: 2017-01-01

## 2017-08-05 PROBLEM — L03.116 CELLULITIS OF LEFT LOWER EXTREMITY: Status: ACTIVE | Noted: 2017-01-01

## 2017-08-05 PROBLEM — I50.43 ACUTE ON CHRONIC COMBINED SYSTOLIC AND DIASTOLIC HEART FAILURE (HCC): Status: ACTIVE | Noted: 2017-01-01

## 2017-08-05 PROBLEM — E78.5 HYPERLIPIDEMIA: Status: ACTIVE | Noted: 2017-01-01

## 2017-08-05 PROBLEM — R77.8 ELEVATED TROPONIN: Status: ACTIVE | Noted: 2017-01-01

## 2017-08-05 PROBLEM — I10 ACCELERATED HYPERTENSION: Status: ACTIVE | Noted: 2017-01-01

## 2017-08-06 PROBLEM — F10.10 ALCOHOL ABUSE: Status: ACTIVE | Noted: 2017-01-01

## 2017-08-06 PROBLEM — F10.939 ALCOHOL WITHDRAWAL (HCC): Status: ACTIVE | Noted: 2017-01-01

## 2017-08-17 PROBLEM — L03.113 RIGHT ARM CELLULITIS: Status: ACTIVE | Noted: 2017-01-01

## 2017-08-17 PROBLEM — I82.611 THROMBOSIS OF RIGHT CEPHALIC VEIN: Status: ACTIVE | Noted: 2017-01-01

## 2017-08-20 PROBLEM — D63.8 ANEMIA OF CHRONIC DISEASE: Status: ACTIVE | Noted: 2017-01-01

## 2017-08-22 PROBLEM — E87.0 HYPERNATREMIA: Status: ACTIVE | Noted: 2017-01-01

## 2017-08-23 PROBLEM — R79.89 AZOTEMIA: Status: ACTIVE | Noted: 2017-01-01

## 2017-09-01 NOTE — PROGRESS NOTES
"   LOS: 27 days    Patient Care Team:  No Known Provider as PCP - General  No Known Provider as PCP - Family Medicine    Chief Complaint: Acute kidney injury on chronic kidney disease    Subjective   Denies any complaint, but appears confused.    Review of Systems:   Pleasantly confused and Denies any nausea vomiting diarrhea constipation chest pain or shortness of breath.    Vital Sign Min/Max for last 24 hours  Temp  Min: 98.2 °F (36.8 °C)  Max: 98.2 °F (36.8 °C)   BP  Min: 121/74  Max: 149/71   Pulse  Min: 60  Max: 131   Resp  Min: 18  Max: 18   No Data Recorded   No Data Recorded   Weight  Min: 228 lb 9.6 oz (104 kg)  Max: 228 lb 9.6 oz (104 kg)     Flowsheet Rows         First Filed Value    Admission Height  72\" (182.9 cm) Documented at 08/05/2017 1559    Admission Weight  215 lb (97.5 kg) Documented at 08/05/2017 1559             I/O last 3 completed shifts:  In: 4322 [Other:1343; NG/GT:2979]  Out: 2200 [Urine:2200]    Physical Exam:  General appearance:Patient is awake alert in place and person.  Following commands still mild confusion  HEENT: Eyes pupils are reactive and equal, neck supple, no JVD.  Lungs: Clear to auscultation, no rhonchi's, Rales, equal chest movement, nonlabored.  Heart: No gallop murmur or rub.  Abdomen: Soft nontender megaly positive bowel sounds.  Extremities: Mild dependent lower extremity edema.   CNS: Patient is alert awake following commands and moving all extremities no focal deficit  Psych mild anxiety is noted.  Skin warm dry Positive redness noted right forearm.    WBC WBC   Date Value Ref Range Status   09/01/2017 4.64 3.50 - 10.80 10*3/mm3 Final      HGB Hemoglobin   Date Value Ref Range Status   09/01/2017 7.1 (L) 13.1 - 17.5 g/dL Final      HCT Hematocrit   Date Value Ref Range Status   09/01/2017 22.8 (L) 38.9 - 50.9 % Final      Platlets No results found for: LABPLAT   MCV MCV   Date Value Ref Range Status   09/01/2017 88.4 80.0 - 99.0 fL Final          Sodium Sodium "   Date Value Ref Range Status   09/01/2017 138 132 - 146 mmol/L Final   08/30/2017 136 132 - 146 mmol/L Final      Potassium Potassium   Date Value Ref Range Status   09/01/2017 4.5 3.5 - 5.5 mmol/L Final   08/30/2017 4.5 3.5 - 5.5 mmol/L Final      Chloride Chloride   Date Value Ref Range Status   09/01/2017 103 99 - 109 mmol/L Final   08/30/2017 105 99 - 109 mmol/L Final      CO2 CO2   Date Value Ref Range Status   09/01/2017 25.0 20.0 - 31.0 mmol/L Final   08/30/2017 26.0 20.0 - 31.0 mmol/L Final      BUN BUN   Date Value Ref Range Status   09/01/2017 94 (H) 9 - 23 mg/dL Final   08/30/2017 97 (H) 9 - 23 mg/dL Final      Creatinine Creatinine   Date Value Ref Range Status   09/01/2017 2.10 (H) 0.60 - 1.30 mg/dL Final   08/30/2017 2.00 (H) 0.60 - 1.30 mg/dL Final      Calcium Calcium   Date Value Ref Range Status   09/01/2017 8.5 (L) 8.7 - 10.4 mg/dL Final   08/30/2017 8.6 (L) 8.7 - 10.4 mg/dL Final      PO4 No results found for: CAPO4   Albumin Albumin   Date Value Ref Range Status   09/01/2017 3.10 (L) 3.20 - 4.80 g/dL Final   08/30/2017 3.30 3.20 - 4.80 g/dL Final      Magnesium Magnesium   Date Value Ref Range Status   08/30/2017 2.4 1.3 - 2.7 mg/dL Final      Uric Acid No results found for: URICACID        Results Review:     I reviewed the patient's new clinical results.      atorvastatin 10 mg Oral Daily   carvedilol 25 mg Oral Q12H   castor oil-balsam peru  Topical Q12H   docusate sodium 150 mg Oral Daily   heparin (porcine) 5,000 Units Subcutaneous Q12H   hydrALAZINE 75 mg Oral Q8H   miconazole  Topical Q12H   miconazole  Topical Q12H   polyethylene glycol 17 g Oral Daily   terazosin 5 mg Oral Q12H          Medication Review: Noted above    Assessment/Plan     1- ACUTE KIDNEY INJURY IN CKD STAGE 4--Baseline Cr reportedly low-2's 2015.  Working Dx is [facility].  Good UOP.  Cr stable, albeit BUN still quite high.  Adjust meds for a CrCl 20-25ml/min.  Follow BMP and UOP daily.    2- ANEMIA NOS--Hgb  far below target.  Check stool guaiac since BUN staying elevated, which could indicate GI bleed.  Consider transfusing 2units PRBC's today.    Jovanny Arellano MD  09/01/17  8:19 AM

## 2017-09-01 NOTE — PLAN OF CARE
Problem: Cellulitis (Adult)  Goal: Signs and Symptoms of Listed Potential Problems Will be Absent or Manageable (Cellulitis)  Outcome: Ongoing (interventions implemented as appropriate)    08/31/17 5650   Cellulitis   Problems Assessed (Cellulitis) all   Problems Present (Cellulitis) situational response

## 2017-09-01 NOTE — PROGRESS NOTES
"Continued Stay Note  [facility]     Patient Name: Franky Pacheco  MRN: [id number]  Today's Date: 9/1/2017    Admit Date: 8/5/2017          Discharge Plan       09/01/17 1118    Case Management/Social Work Plan    Plan Undecided    Additional Comments Remains confused; pulled keofeed out last night; limited participation with Speech Therapy today; PEG vs. comfort diet.  Many social issues in place:  Advanced Directive in medical record-lists Christ Vanessa & Carrie Pacheco as Designated Healthcare Surrogates; estranged daughter in [place] is legal next of kin; friend Everardo at bedside with no legal authority to make decisions other than he is \"assisting daughter with decision making\" per her request.  APS now involved due to daughters reluctance/refusal to obtain Guardianship in order to complete nursing facility paperwork/complete Medicaid application/manage patient's finances, all of which will be required by any accepting nursing facility. Additionally friend Everardo has not moved forward with petitioning court for Guardianship.  Daughter/friend remain insistent patient go to [facility] vs. SNF, although [facility] has declined acceptance x 2.  Discussed with Dr. Reynolds this morning, she is planning to contact daughter today to discuss goals of care.  Tanna Lind, Ext. [phone]                 Discharge Codes     None        Expected Discharge Date and Time     Expected Discharge Date Expected Discharge Time    Sep 8, 2017             CHEL Fang    "

## 2017-09-01 NOTE — PLAN OF CARE
Problem: Patient Care Overview (Adult)  Goal: Plan of Care Review  Outcome: Ongoing (interventions implemented as appropriate)    09/01/17 0924   Outcome Evaluation   Outcome Summary/Follow up Plan Saw pt for dys tx. Pt w/ limited participation today. Refused to take PO trials in tx. Spoke to RN who reports pt pulled his keofeed last night. Safest for pt still NPO. Pt w/ fluctuating severity of dysphagia. Now w/ baseline gurgled vocal quality, not appropriate for repeat instrumental. Discussed PEG vs. comfort diet w/ RN who is going to discuss w/ MD given unsure of POC. SLP will cont to follow for dys tx.    Coping/Psychosocial Response Interventions   Plan Of Care Reviewed With patient         Problem: Inpatient SLP  Goal: Dysphagia- Patient will improve swallowing skills to the level that a repeat evaluation is indicated  Outcome: Ongoing (interventions implemented as appropriate)    08/29/17 1502 09/01/17 0924   Repeat Evaluation Needed   Swallow Skills to Level that Repeat Evaluation Indicated- SLP, Date Established 08/29/17 --    Swallow Skills to Level that Repeat Evaluation Indicated- SLP, Time to Achieve by discharge --    Swallow Skills to Level that Repeat Evaluation Indicated- SLP, Date Goal Reviewed --  09/01/17   Swallow Skills to Level that Repeat Evaluation Indicated- SLP, Outcome --  goal ongoing   Swallow Skills to Level that Repeat Evaluation Indicated- SLP, Reason Goal Not Met --  progress slower than expected

## 2017-09-01 NOTE — PROGRESS NOTES
[facility] Medicine Services  INPATIENT PROGRESS NOTE    Date of Admission: 8/5/2017  Length of Stay: 27  Primary Care Physician: No Known Provider  Subjective   CC: follow up altered mental status    HPI:  Patient lying in bed and more alert. Knows his name, when I ask if he would like Keofeed reinserted, he says probably not.  He is pleasant and cooperative.     No acute events occurred overnight.       Review Of Systems:   Review of Systems  Unable to obtain secondary to AMS  Objective    Temp:  [97.8 °F (36.6 °C)-98.2 °F (36.8 °C)] 97.8 °F (36.6 °C)  Heart Rate:  [] 79  Resp:  [18] 18  BP: (121-149)/(71-75) 149/71  Physical Exam   Physical Examination:    General Appearance:    More alert , with more verbal communication, yet pleasantly confused, not in any acute distress.     Head:    Atraumatic and normocephalic, without obvious abnormality.   Eyes:           conjunctivae and sclerae normal, no Icterus. No pallor. Extra-occular movements are within normal limits. KEOFEED outl   Throat:   No oral lesions   Neck:   Supple, trachea midline, no thyromegaly, no carotid bruit   Lungs:     Chest shape is normal. Breath sounds heard bilaterally equally reduced.  No crackles or wheezing. No Pleural rub or bronchial breathing    Heart:    Normal S1 and S2, no murmur, no gallop, no rub. No JVD   Abdomen:     Normal bowel sounds, no masses, no organomegaly. Soft        non-tender, non-distended, no guarding, no rebound                tenderness   Extremities:   Moves all extremities well, 1+= edema, no cyanosis, no             clubbing   Skin:   No bleeding, bruising or rash   Neurologic:   Gross sensation intact, no tremor. Moves arms left more than right without difficulty         Results Review:    I have reviewed the labs, radiology results and diagnostic studies.    Results from last 7 days  Lab Units 09/01/17  0439   WBC 10*3/mm3 4.64   HEMOGLOBIN g/dL 7.1*   PLATELETS 10*3/mm3 164        Results from last 7 days  Lab Units 09/01/17  0439   SODIUM mmol/L 138   POTASSIUM mmol/L 4.5   CHLORIDE mmol/L 103   CO2 mmol/L 25.0   BUN mg/dL 94*   CREATININE mg/dL 2.10*   GLUCOSE mg/dL 134*   CALCIUM mg/dL 8.5*     Culture Data:   Microbiology Results (last 10 days)     ** No results found for the last 240 hours. **        Radiology Data:   Imaging Results (last 24 hours)     ** No results found for the last 24 hours. **        I have reviewed the medications  Assessment/Plan   Problem List  Hospital Problem List     * (Principal)Accelerated hypertension    Complete heart block    KRISTA with CKD    Bilateral LE cellulitis    Chronic alcohol use    Non-compliance    Altered mental status (Probable metabolic encephalopathy)    ? Cerebral vascular disease (Prior carotid stent)    Elevated troponin (R/O Type 2 NSTEMI)    Hyperlipidemia    CAD (Prior stent)    Alcohol withdrawal    Thrombosis of right cephalic vein    Right arm cellulitis    Anemia of chronic disease    Hypernatremia    Azotemia        Assessment/Plan:  Patient admitted for acute onset confusion, in hypertensive emergency. He was treated for cellulitis, complete heart block and originally admitted to ICU post pacemaker placement. He has continued to have reduced activity, continued confusion and delirium and dysphagia.     1. Acute Delirium, present on admission, with persistant cognitive impairment:   He has persistent intermittent confusion with persistent functional decline. He is able to tell me his name today and that he would prefer not to have keofeed replaced.  He has pulled this out now.     2. Acute kidney injury on CKD : improved but still creatinine 2. Nephrology following.    3. Hypernatremia resolved    4. Dysphagia: pt has now pulled his keofeed. No consent was given for PEG or replacement of keofeed, I have dw daughter via phone, Living will states no artificial nutrition desired.Daughter has admitted to being estranged from her  father for years but she and pt's friend are making decisions currently.  She would appreciate and weekly/twice weekly call from the hospitalist, since she lives in [place] and cannot visit more often. She also has family friend, Everardo, that states he has desire to become a guardian in future but the friend has not completed paperwork/steps to initiate the process. Social Work following . Daughter wants long term caretaker at home, with friend assistance. I have dw daughter options of home with hospice and she wishes to talk with friend Everardo before making further decisions.  I have also attempted to call ex- wife Carrie who is health care surrogate.  I have left a message on her machine for call back.     5. Lower extremity cellulitis/RUE cellulitis: Improved, s/p completion of doxy and ceftriaxone.    6. Anemia- will guiac stool and order work up.  Hg down to 7.1 today will moniter may need to transfuse if family agreeable.     Discussed daily with SS. APS has been notified and patient will likely require guardianship legal hearing. Anticipate patient discharge will be extended, pending further long term decision making. I have dw Pt's Daugther who wishes to call and talk to Everardo (friend) about pt's wishes.  She would not like to replace david at this time.  She also doesn't want him moved to [place] and is aware he isn't a candidate for [facility]        DVT prophylaxis: heparin  PPX: Pepcid    Discharge Planning: pending rehab acute placement.  Code: Full code status    Kati Reynolds, DO   09/01/17   12:02 PM

## 2017-09-01 NOTE — PROGRESS NOTES
[facility]       LOS: 27 days   Patient Care Team:  No Known Provider as PCP - General  No Known Provider as PCP - Family Medicine        Subjective       Patient Denies:  Confused but follows commands      Vital Signs  Temp:  [97.8 °F (36.6 °C)-98.2 °F (36.8 °C)] 97.8 °F (36.6 °C)  Heart Rate:  [] 79  Resp:  [18] 18  BP: (121-149)/(71-75) 149/71    Intake/Output Summary (Last 24 hours) at 09/01/17 0904  Last data filed at 09/01/17 0500   Gross per 24 hour   Intake             2449 ml   Output             1450 ml   Net              999 ml          Physical Exam:     General Appearance:    in no acute distress       Neck:   No adenopathy, supple, trachea midline, no JVD       Lungs:    wheezes to auscultation,respirations regular, even and                  unlabored    Heart:    Regular rhythm and normal rate, normal S1 and S2, no            murmur, no gallop, no rub, no click   Chest Wall:    No abnormalities observed   Abdomen:     Normal bowel sounds, no masses, no organomegaly              Extremities:   no edema, no cyanosis, no redness   Pulses:   Pulses palpable and equal bilaterally     Results Review:     I reviewed the patient's new clinical results.      WBC WBC   Date/Time Value Ref Range Status   09/01/2017 0439 4.64 3.50 - 10.80 10*3/mm3 Final            HGB Hemoglobin   Date/Time Value Ref Range Status   09/01/2017 0439 7.1 (L) 13.1 - 17.5 g/dL Final           HCT Hematocrit   Date/Time Value Ref Range Status   09/01/2017 0439 22.8 (L) 38.9 - 50.9 % Final            Platlets No results found for: LABPLAT  Sodium  Sodium   Date/Time Value Ref Range Status   09/01/2017 0439 138 132 - 146 mmol/L Final   08/30/2017 1408 136 132 - 146 mmol/L Final     Potassium  Potassium   Date/Time Value Ref Range Status   09/01/2017 0439 4.5 3.5 - 5.5 mmol/L Final   08/30/2017 1408 4.5 3.5 - 5.5 mmol/L Final     Chloride  Chloride   Date/Time Value Ref  Range Status   09/01/2017 0439 103 99 - 109 mmol/L Final   08/30/2017 1408 105 99 - 109 mmol/L Final     BicarbonateNo results found for: PLASMABICARB    BUN BUN   Date/Time Value Ref Range Status   09/01/2017 0439 94 (H) 9 - 23 mg/dL Final   08/30/2017 1408 97 (H) 9 - 23 mg/dL Final      Creatinine Creatinine   Date/Time Value Ref Range Status   09/01/2017 0439 2.10 (H) 0.60 - 1.30 mg/dL Final   08/30/2017 1408 2.00 (H) 0.60 - 1.30 mg/dL Final      Calcium Calcium   Date/Time Value Ref Range Status   09/01/2017 0439 8.5 (L) 8.7 - 10.4 mg/dL Final   08/30/2017 1408 8.6 (L) 8.7 - 10.4 mg/dL Final      Mag Magnesium   Date/Time Value Ref Range Status   08/30/2017 1408 2.4 1.3 - 2.7 mg/dL Final           PT/INR:  No results found for: PROTIME/No results found for: INR  Troponin I     Lab Results   Component Value Date    TROPONINI 0.280 (H) 08/07/2017         atorvastatin 10 mg Oral Daily   carvedilol 25 mg Oral Q12H   castor oil-balsam peru  Topical Q12H   docusate sodium 150 mg Oral Daily   heparin (porcine) 5,000 Units Subcutaneous Q12H   hydrALAZINE 75 mg Oral Q8H   miconazole  Topical Q12H   miconazole  Topical Q12H   polyethylene glycol 17 g Oral Daily   terazosin 5 mg Oral Q12H          Assessment/Plan     Patient Active Problem List   Diagnosis Code   • Complete heart block I44.2   • Accelerated hypertension I10   • KRISTA with CKD N17.9   • Bilateral LE cellulitis L03.116   • Chronic alcohol use F10.10   • Non-compliance Z91.19   • Altered mental status (Probable metabolic encephalopathy) R41.82   • ? Cerebral vascular disease (Prior carotid stent) I67.9   • Elevated troponin (R/O Type 2 NSTEMI) R79.89   • Hyperlipidemia E78.5   • CAD (Prior stent) I25.10   • Alcohol withdrawal F10.239   • Thrombosis of right cephalic vein I80.8   • Right arm cellulitis L03.113   • Anemia of chronic disease D63.8   • Hypernatremia E87.0   • Azotemia R79.89     CV no change  Watch H&H. ? transfusion  Await placement    Vidal CAMARA  MARAH Stone  09/01/17  9:04 AM

## 2017-09-01 NOTE — THERAPY TREATMENT NOTE
Acute Care - Speech Language Pathology   Swallow Treatment Note [facility]     Patient Name: Franky Pacheco  : 1935  MRN: [id number]  Today's Date: 2017  Onset of Illness/Injury or Date of Surgery Date: 17            Admit Date: 2017    Visit Dx:      ICD-10-CM ICD-9-CM   1. Pharyngeal dysphagia R13.13 787.23   2. Complete heart block I44.2 426.0   3. Uncontrolled hypertension I10 401.9   4. Impaired functional mobility, balance, gait, and endurance Z74.09 V49.89   5. Impaired mobility and ADLs Z74.09 799.89     Patient Active Problem List   Diagnosis   • Complete heart block   • Accelerated hypertension   • KRISTA with CKD   • Bilateral LE cellulitis   • Chronic alcohol use   • Non-compliance   • Altered mental status (Probable metabolic encephalopathy)   • ? Cerebral vascular disease (Prior carotid stent)   • Elevated troponin (R/O Type 2 NSTEMI)   • Hyperlipidemia   • CAD (Prior stent)   • Alcohol withdrawal   • Thrombosis of right cephalic vein   • Right arm cellulitis   • Anemia of chronic disease   • Hypernatremia   • Azotemia             Adult Rehabilitation Note       17 0900 17 0900 17 1511    Rehab Assessment/Intervention    Discipline speech language pathologist  -RD speech language pathologist  -RD occupational therapist  -AC    Document Type therapy note (daily note)  -RD therapy note (daily note)  -RD therapy note (daily note)  -AC    Subjective Information agree to therapy  -RD agree to therapy  -RD --   minimal verbalizations,  agitated  -AC    Patient Effort, Rehab Treatment poor  -RD fair  -RD     Symptoms Noted Comment confusion  -RD confusion, fatigue  -RD confused, agitated  -AC    Precautions/Limitations   fall precautions   NG  -AC    Precautions/Limitations, Hearing   hearing aid, bilaterally  -AC    Recorded by [RD] Ruby Khan, MS CF-SLP [RD] Ruby Khan, MS CF-SLP [AC] Lillie Taylor, OT    Pain Assessment    Pain Assessment Villarreal-Diehl FACES   -RD Villarreal-Diehl FACES  -RD Villarreal-Baker FACES  -AC    Villarreal-Diehl FACES Pain Rating 0  -RD 0  -RD 0  -AC    Recorded by [RD] Ruby Khan MS CF-SLP [RD] Ruby Kahn MS CF-SLP [AC] Lillie Taylor, OT    Cognitive Assessment/Intervention    Current Cognitive/Communication Assessment   impaired   minimal verbalizations today  -AC    Orientation Status   --   pt did not state name upon request  -AC    Follows Commands/Answers Questions   25% of the time;needs cueing;needs repetition  -AC    Personal Safety   severe impairment  -AC    Recorded by   [AC] Lillie Taylor, OT    Dysphagia/Swallow Intervention    Dysphagia/Swallow Caregiver Training Spoke to RN who reports pt pulled his keofeed last night. Safest for pt still NPO. Limited cooperation in treatment today. Discussed PEG vs. comfort diet w/ RN who is going to discuss w/ MD given unsure of POC.  -RD      Recorded by [RD] Ruby Khan MS CF-SLP      Improve oral skills    To Improve Oral Skills, patient will: Increase tongue tip elevation;Increase tongue lateralization;Increase tongue A-P movement;Increase back of tongue control;80%;with inconsistent cues  -RD Increase tongue tip elevation;Increase tongue lateralization;Increase tongue A-P movement;Increase back of tongue control;80%;with inconsistent cues  -RD     Status: Improve Oral Skills Progress slower than expected  -RD Progress slower than expected  -RD     Oral Skills Progress 0%;with consistent cues;continue to adress  -RD 10%;with consistent cues;following model;continue to adress  -RD     Recorded by [RD] Ruby Khan MS CF-SLP [RD] Ruby Khan MS CF-SLP     Improve timing of pharyngeal response    To improve timing of pharyngeal response, patient will: Swallow in timely way using three second prep;80%;with inconsistent cues  -RD Swallow in timely way using three second prep;80%;with inconsistent cues  -RD     Status: Improve timing of pharyngeal response Progress slower than  expected  -RD Progress slower than expected  -RD     Timing of Pharyngeal Response Progress 0%;with consistent cues;continue to adress  -RD 10%;with consistent cues;following model;continue to adress  -RD     Recorded by [ROSA] Ruby Khan MS CF-SLP [RD] Ruby Khan MS CF-SLP     Improve closure at entrance to airway    To improve closure at entrance to airway, patient will: Complete super-supraglottic swallow;80%;with inconsistent cues  -RD Complete super-supraglottic swallow;80%;with inconsistent cues  -RD     Status: Improve closure at entrance to airway Progress slower than expected  -RD Progress slower than expected  -RD     Closure at Entrance to Airway Progress 0%;with consistent cues;following model;continue to adress  -RD 0%;with consistent cues;following model;continue to adress  -RD     Recorded by [ROSA] Ruby Khan MS CF-SLP [RD] Ruby Khan MS CF-SLP     Improve laryngeal elevation    To improve laryngeal elevation, patient will: Complete pitch-glide;80%;with inconsistent cues  -RD Complete pitch-glide;80%;with inconsistent cues  -RD     Status: Improve laryngeal elevation Progress slower than expected  -RD Progressing as expected  -RD     Laryngeal Elevation Progress 0%;with consistent cues;continue to adress  -RD 30%;with consistent cues;following model;continue to adress  -RD     Comments: Improve laryngeal elevation  pitch glide- MPT 15 sec  -RD     Recorded by [RD] Ruby Khan MS CF-SLP [RD] Ruby Khan MS CF-SLP     Improve hyolaryngeal excursion    To improve hyolaryngeal excursion, patient will: Complete chin tuck against resistance (comment number of repetitions);80%;with inconsistent cues  -RD Complete chin tuck against resistance (comment number of repetitions);80%;with inconsistent cues  -RD     Status: Improve hyolaryngeal excursion Progress slower than expected  -RD Progress slower than expected  -RD     Hyolaryngeal Excursion Progress 0%;with  consistent cues;continue to adress  -RD 10%;with consistent cues;following model;continue to adress  -RD     Recorded by [RD] Ruby Khan MS CF-SLP [RD] uRby Khan, MS CF-SLP     Improve tongue base & pharyngeal wall squeeze    To improve tongue base & pharyngeal wall squeeze, patient will: Complete tongue base retraction;Complete effortful swallow;Complete tongue hold swallow;80%;with inconsistent cues  -RD Complete tongue base retraction;Complete effortful swallow;Complete tongue hold swallow;80%;with inconsistent cues  -RD     Status: Improve tongue base & pharyngeal wall squeeze Progress slower than expected  -RD Progress slower than expected  -RD     Tongue Base/Pharyngeal Wall Squeeze Progress 0%;with consistent cues;continue to adress  -RD 20%;with consistent cues;following model;continue to adress  -RD     Recorded by [RD] Ruby Khan MS CF-SLP [RD] Ruby Khan MS CF-SLP     Dysphagia Other 1    Dysphagia Other 1 Objective Patient will tolerate pudding-thick snacks w/ no overt s/s of aspiration or complications.  -RD Patient will tolerate pudding-thick snacks w/ no overt s/s of aspiration or complications.  -RD     Status: Dysphagia Other 1 Progress slower than expected  -RD Progress slower than expected  -RD     Dysphagia Other 1 Progress continue to address  -RD continue to address  -RD     Comments: Dysphagia Other 1 Pt was alert but refused to take PO trials today.   -RD Pt alert but lethargic. Trials of pudding consistency given. Overt s/s of aspiration c/b wet-gurgled vocal quality, suspect given lethargy today. Only complete PO trials of pudding consistency if fully alert.   -RD     Recorded by [RD] Ruby Khan MS CF-SLP [RD] Ruby Khan, MS CF-SLP     Bed Mobility, Assessment/Treatment    Bed Mobility, Comment   did not attempt d/t pt confusion, agitation  -AC    Recorded by   [AC] Lillie Taylor, OT    Therapy Exercises    Bilateral Upper Extremity    AAROM:;elbow flexion/extension;hand pumps;pronation/supination;shoulder extension/flexion;shoulder horizontal abd/add   2 sets x 15; pt resistive at times  -AC    Recorded by   [AC] Lillie Taylor OT    Positioning and Restraints    Pre-Treatment Position   in bed  -AC    Post Treatment Position   bed  -AC    In Bed   supine;call light within reach;encouraged to call for assist;exit alarm on  -AC    Recorded by   [AC] Lillie Taylor, OT      08/30/17 1420 08/30/17 1320       Rehab Assessment/Intervention    Discipline physical therapist  - physical therapy assistant  -UD     Document Type therapy note (daily note);discharge summary  - therapy note (daily note)  -UD     Subjective Information agree to therapy;decreased LOC   Pt confused and slightly combative  - unable to respond   pt only says a few words today  -UD     Patient Effort, Rehab Treatment  poor  -UD     Symptoms Noted Comment  very cofused,agitated  -UD     Precautions/Limitations  fall precautions  -UD     Precautions/Limitations, Hearing  hearing impairment, bilaterally  -UD     Specific Treatment Considerations  confusion  -UD     Recorded by [] Norris Lebron, PT [UD] Sandy Benítez PTA     Vital Signs    Pre Patient Position  Supine  -UD     Intra Patient Position  Supine  -UD     Post Patient Position  Supine  -UD     Recorded by  [UD] Sandy Benítez PTA     Pain Assessment    Pain Assessment Villarreal-Diehl FACES  - No/denies pain  -UD     Villarreal-Diehl FACES Pain Rating 0  - 0  -UD     Pain Score  0  -UD     Post Pain Score  0  -UD     Recorded by [] Norris Lebron, PT [UD] Sandy Benítez PTA     Cognitive Assessment/Intervention    Orientation Status  unable/difficult to assess  -UD     Follows Commands/Answers Questions  unable/difficult to assess  -UD     Recorded by  [UD] Sandy Benítez PTA     Bed Mobility, Assessment/Treatment    Bed Mob, Supine to Sit, [place]  not tested   RN requested bedside exercises only,pt too confused  today  -UD     Recorded by  [UD] Sandy Benítez PTA     Transfer Assessment/Treatment    Transfers, Bed-Chair [place]  not tested  -UD     Recorded by  [UD] Sandy Benítez PTA     Gait Assessment/Treatment    Gait, [place] Level  unable to perform  -UD     Recorded by  [UD] Sandy Benítez PTA     Therapy Exercises    Bilateral Lower Extremities  AAROM:;10 reps;supine  -UD     Bilateral Upper Extremity  AAROM:;5 reps;10 reps;supine  -UD     Recorded by  [UD] Sandy Benítez PTA     Positioning and Restraints    Pre-Treatment Position in bed  - in bed  -UD     Post Treatment Position bed  - bed  -UD     In Bed supine;call light within reach  - notified nsg;supine;call light within reach;exit alarm on;side rails up x2  -UD     Recorded by [MF] Norris Lebron, PT [UD] Sandy Benítez PTA       User Key  (r) = Recorded By, (t) = Taken By, (c) = Cosigned By    Initials Name Effective Dates    AC Lillie Taylor, OT 06/23/15 -     UD Sandy Benítez, GEM 06/22/15 -     MF Norris Lebron, PT 06/19/15 -     RD Ruby Khan, MS CF-SLP 09/18/16 -                   IP SLP Goals       09/01/17 0924 08/31/17 1035 08/29/17 1502    Begin to Take Some PO Safely    Begin to Take Some PO Safely- SLP, Date Goal Reviewed   08/29/17  -RD    Begin to Take Some PO Safely- SLP, Outcome   goal met  -RD    Repeat Evaluation Needed    Swallow Skills to Level that Repeat Evaluation Indicated- SLP, Date Established   08/29/17  -RD    Swallow Skills to Level that Repeat Evaluation Indicated- SLP, Time to Achieve   by discharge  -RD    Swallow Skills to Level that Repeat Evaluation Indicated- SLP, Date Goal Reviewed 09/01/17  -RD 08/31/17  -RD 08/29/17  -RD    Swallow Skills to Level that Repeat Evaluation Indicated- SLP, Outcome goal ongoing  -RD goal ongoing  -RD goal ongoing  -RD    Swallow Skills to Level that Repeat Evaluation Indicated- SLP, Reason Goal Not Met progress slower than expected  -RD        08/28/17 1429 08/25/17  1350 08/24/17 1603    Safely Consume Diet    Safely Consume Diet- SLP, Date Established   08/24/17  -HG    Safely Consume Diet- SLP, Date Goal Reviewed  08/25/17  -RD     Safely Consume Diet- SLP, Outcome  goal no longer appropriate  -RD goal ongoing  -HG    Begin to Take Some PO Safely    Begin to Take Some PO Safely- SLP, Date Established  08/25/17  -RD     Begin to Take Some PO Safely- SLP, Time to Achieve  by discharge  -RD     Begin to Take Some PO Safely- SLP, Date Goal Reviewed 08/28/17  -RD 08/25/17  -RD     Begin to Take Some PO Safely- SLP, Outcome goal ongoing  -RD goal ongoing  -RD       08/21/17 1508 08/21/17 1105       Safely Consume Diet    Safely Consume Diet- SLP, Date Established 08/21/17  -AC      Safely Consume Diet- SLP, Time to Achieve by discharge  -AC      Begin to Take Some PO Safely    Begin to Take Some PO Safely- SLP, Date Goal Reviewed 08/21/17  -AC 08/21/17  -AC     Begin to Take Some PO Safely- SLP, Outcome goal met  -AC goal ongoing  -AC       User Key  (r) = Recorded By, (t) = Taken By, (c) = Cosigned By    Initials Name Provider Type    AC Angelic Goss, MS CCC-SLP Speech and Language Pathologist    ENOC Mendiola, MS CCC-SLP Speech and Language Pathologist    ROSA Khan, MS -SLP Speech and Language Pathologist          EDUCATION  The patient has been educated in the following areas:   Dysphagia (Swallowing Impairment) Oral Care/Hydration NPO rationale.    SLP Recommendation and Plan                                           Plan of Care Review  Plan Of Care Reviewed With: patient  Progress: progress toward functional goals is gradual  Outcome Summary/Follow up Plan: Saw pt for dys tx. Pt w/ limited participation today. Refused to take PO trials in tx. Spoke to RN who reports pt pulled his keofeed last night. Safest for pt still NPO. Pt w/ fluctuating severity of dysphagia. Now w/ baseline gurgled vocal quality, not appropriate for repeat instrumental. Discussed PEG  vs. comfort diet w/ RN who is going to discuss w/ MD given unsure of POC. SLP will cont to follow for dys tx.            Time Calculation:         Time Calculation- SLP       09/01/17 0928          Time Calculation- SLP    SLP Start Time 0900  -RD      SLP Received On 09/01/17  -RD        User Key  (r) = Recorded By, (t) = Taken By, (c) = Cosigned By    Initials Name Provider Type    ROSA Khan MS CF-SLP Speech and Language Pathologist          Therapy Charges for Today     Code Description Service Date Service Provider Modifiers Qty    07591212600 HC ST TREATMENT SWALLOW 4 8/31/2017 Ruby Khan MS CF-SLP GN 1    [id number] HC ST TREATMENT SWALLOW 2 9/1/2017 Ruby Khan MS CF-SLP GN 1                 MS JODY Darling-SLP  9/1/2017

## 2017-09-02 NOTE — THERAPY RE-EVALUATION
Acute Care - Speech Language Pathology   Swallow Re-Evaluation [facility]   Clinical Swallow Re-Evaluation     Patient Name: Franky Pacheco  : 1935  MRN: [id number]  Today's Date: 2017  Onset of Illness/Injury or Date of Surgery Date: 17            Admit Date: 2017    Visit Dx:     ICD-10-CM ICD-9-CM   1. Pharyngeal dysphagia R13.13 787.23   2. Complete heart block I44.2 426.0   3. Uncontrolled hypertension I10 401.9   4. Impaired functional mobility, balance, gait, and endurance Z74.09 V49.89   5. Impaired mobility and ADLs Z74.09 799.89     Patient Active Problem List   Diagnosis   • Complete heart block   • Accelerated hypertension   • KRISTA with CKD   • Bilateral LE cellulitis   • Chronic alcohol use   • Non-compliance   • Altered mental status (Probable metabolic encephalopathy)   • ? Cerebral vascular disease (Prior carotid stent)   • Elevated troponin (R/O Type 2 NSTEMI)   • Hyperlipidemia   • CAD (Prior stent)   • Alcohol withdrawal   • Thrombosis of right cephalic vein   • Right arm cellulitis   • Anemia of chronic disease   • Hypernatremia   • Azotemia     Past Medical History:   Diagnosis Date   • CHF (congestive heart failure)    • Diphtheria     childhood   • Hyperlipidemia    • Hypertension      Past Surgical History:   Procedure Laterality Date   • CARDIAC ELECTROPHYSIOLOGY PROCEDURE N/A 2017    Procedure: Pacemaker;  Surgeon: Jim Mcneil MD;  Location: [facility] INVASIVE LOCATION;  Service:    • CAROTID STENT            SWALLOW EVALUATION (last 72 hours)      Swallow Evaluation       17 1330                Rehab Evaluation    Document Type re-evaluation  -SM        Subjective Information no complaints  -SM        General Information    Subjective Patient Observations Aroused with cues, cooperative  -SM        Plans/Goals Discussed With patient  -SM        Barriers to Rehab cognitive status  -SM        Clinical Impression    SLP Diet Recommendation comfort  "diet;II - pureed;thin liquids  -        Recommended Feeding/Eating Techniques maintain upright posture during/after eating for 30 mins;small sips/bites;liquid via spoon only  -        SLP Rec. for Method of Medication Administration meds whole in pudding/applesauce;meds crushed in pudding/applesauce   crush as able  -        Pain Assessment    Pain Assessment Villarreal-Diehl FACES  -        Villarreal-Baker FACES Pain Rating 2  -        Cognitive Assessment/Intervention    Current Cognitive/Communication Assessment impaired  -        Oral Motor Structure and Function    Oral Motor Assessment Comment Generalized reduced ROM and strength  -        Clinical Swallow Exam    Pharyngeal Phase Results sign/symptoms of pharyngeal impairment;cough;throat clear  -        Summary of Clinical Exam Dysphagia: Note pt does not wish for PEG or replacement of Keofeed though decisions with pt's family pending. Re-assessed pt to determine if any improvement.  Showing no significant change.  Intermittent wet voice and cough at baseline - not managing secretions well. Delayed wet cough eventually with all trials.  Only immediate coughing (discomfort) with thin and nectar-thick liquids by cup/straw. No signs of discomfort with pudding or thin liquids via tsp. Only wanted a few bites/sips before \"I'm so full\".  Pt is not safe for PO intake nor would he likely be able to maintain nutrition via PO alone. If proceed with palliative PO diet, suggest Dysphagia Level 2 Puree diet with thin liquids via tsp, upright with intake, feed only when alert, meds in applesauce - crush as able. Oral care BID and PRN.  Will continue to follow as POC decisions made.  Thanks   -          User Key  (r) = Recorded By, (t) = Taken By, (c) = Cosigned By    Initials Name Effective Dates    SUNITHA Moreno, MS CCC-SLP 06/22/15 -         EDUCATION  The patient has been educated in the following areas:   Dysphagia (Swallowing Impairment).    SLP " "Recommendation and Plan   If Pursuing comfort PO diet:  SLP Diet Recommendation: comfort diet, II - pureed, thin liquids  Recommended Feeding/Eating Techniques: maintain upright posture during/after eating for 30 mins, small sips/bites, liquid via spoon only  SLP Rec. for Method of Medication Administration: meds whole in pudding/applesauce, meds crushed in pudding/applesauce (crush as able)           Plan of Care Review  Plan Of Care Reviewed With: patient  Outcome Summary/Follow up Plan: Dysphagia: Note pt does not wish for PEG or replacement of Keofeed though decisions with pt's family pending. Re-assessed pt to determine if any improvement.  Showing no significant change.  Intermittent wet voice and cough at baseline - not managing secretions well. Delayed wet cough eventually with all trials.  Only immediate coughing (discomfort) with thin and nectar-thick liquids by cup/straw. No signs of discomfort with pudding or thin liquids via tsp. Only wanted a few bites/sips before \"I'm so full\".  Pt is not safe for PO intake nor would he likely be able to maintain nutrition via PO alone. If proceed with palliative PO diet, suggest Dysphagia Level 2 Puree diet with thin liquids via tsp, upright with intake, feed only when alert, meds in applesauce - crush as able. Oral care BID and PRN.  Will continue to follow as POC decisions made.  Thanks           IP SLP Goals       09/01/17 0924 08/31/17 1035 08/29/17 1502    Begin to Take Some PO Safely    Begin to Take Some PO Safely- SLP, Date Goal Reviewed   08/29/17  -RD    Begin to Take Some PO Safely- SLP, Outcome   goal met  -RD    Repeat Evaluation Needed    Swallow Skills to Level that Repeat Evaluation Indicated- SLP, Date Established   08/29/17  -RD    Swallow Skills to Level that Repeat Evaluation Indicated- SLP, Time to Achieve   by discharge  -RD    Swallow Skills to Level that Repeat Evaluation Indicated- SLP, Date Goal Reviewed 09/01/17  -RD 08/31/17  -RD 08/29/17  " -RD    Swallow Skills to Level that Repeat Evaluation Indicated- SLP, Outcome goal ongoing  -RD goal ongoing  -RD goal ongoing  -RD    Swallow Skills to Level that Repeat Evaluation Indicated- SLP, Reason Goal Not Met progress slower than expected  -RD        08/28/17 1429 08/25/17 1350 08/24/17 1603    Safely Consume Diet    Safely Consume Diet- SLP, Date Established   08/24/17  -HG    Safely Consume Diet- SLP, Date Goal Reviewed  08/25/17  -RD     Safely Consume Diet- SLP, Outcome  goal no longer appropriate  -RD goal ongoing  -HG    Begin to Take Some PO Safely    Begin to Take Some PO Safely- SLP, Date Established  08/25/17  -RD     Begin to Take Some PO Safely- SLP, Time to Achieve  by discharge  -RD     Begin to Take Some PO Safely- SLP, Date Goal Reviewed 08/28/17  -RD 08/25/17  -RD     Begin to Take Some PO Safely- SLP, Outcome goal ongoing  -RD goal ongoing  -RD       08/21/17 1508 08/21/17 1105       Safely Consume Diet    Safely Consume Diet- SLP, Date Established 08/21/17  -AC      Safely Consume Diet- SLP, Time to Achieve by discharge  -AC      Begin to Take Some PO Safely    Begin to Take Some PO Safely- SLP, Date Goal Reviewed 08/21/17  -AC 08/21/17  -AC     Begin to Take Some PO Safely- SLP, Outcome goal met  -AC goal ongoing  -AC       User Key  (r) = Recorded By, (t) = Taken By, (c) = Cosigned By    Initials Name Provider Type    AC Angelic Goss, MS CCC-SLP Speech and Language Pathologist    HG Judie Mendiola, MS CCC-SLP Speech and Language Pathologist    ROSA Khan, MS CF-SLP Speech and Language Pathologist               Time Calculation:         Time Calculation- SLP       09/02/17 1427          Time Calculation- SLP    SLP Start Time 1330  -SM      SLP Received On 09/02/17  -        User Key  (r) = Recorded By, (t) = Taken By, (c) = Cosigned By    Initials Name Provider Type    SUNITHA Moreno, MS CCC-SLP Speech and Language Pathologist          Therapy Charges for Today      Code Description Service Date Service Provider Modifiers Qty    [id number]  ST EVAL ORAL PHARYNG SWALLOW 4 9/2/2017 Ronna Moreno, MS CCC-SLP GN 1               Ronna Moreno, MS CCC-SLP  9/2/2017

## 2017-09-02 NOTE — PLAN OF CARE
Problem: Patient Care Overview (Adult)  Goal: Plan of Care Review  Outcome: Ongoing (interventions implemented as appropriate)    09/02/17 2489   Outcome Evaluation   Outcome Summary/Follow up Plan Dysphagia: spoke with MD (Gail) who spoke with pt's ex-wife (POA) who wants to proceed with comfort PO diet. Ordered per previous recommendations. Relayed to RN. SLP will check back for adjustments prn

## 2017-09-02 NOTE — NURSING NOTE
" came running out of room, stating \"help him right now, help.\" Upon entering room, blood found in a large pool bedside the left side of bed. IVF's running. Pt has taken IV tubing apart, and blood was running out of the IV tubing. Restraints (soft wrist) still in place. Patient stable, watching TV, No symptomatic response noted. BP stable, there was blood on the bed, on the floor and all over patient. Could not give estimated blood loss, because IVF's running out in floor also. Patient cleaned up.  here to get labs, Jerica CUMMINGS notified. Will wait for CBC results before any orders.   "

## 2017-09-02 NOTE — PROGRESS NOTES
[facility] Medicine Services  INPATIENT PROGRESS NOTE    Date of Admission: 8/5/2017  Length of Stay: 28  Primary Care Physician: No Known Provider  Subjective   CC: follow up altered mental status    HPI:  Events of last night noted. Pulled IV out, Friend Everardo at bedside, says he is slowly declinng and thinks he has given up hope.  Pt not able to give me his name but says yes when I ask if I can discuss his care with his ex wife tayla. He shakes his head no to me and friend Everardo when asked if he wants us to put keofeed, PEG tube in for feeding. In soft restraints this am     No acute events occurred overnight.       Review Of Systems:   Review of Systems  Unable to obtain secondary to AMS  Objective    Temp:  [95.1 °F (35.1 °C)] 95.1 °F (35.1 °C)  Heart Rate:  [60] 60  Resp:  [18-20] 18  BP: ()/(59-82) 103/67  Physical Exam   Physical Examination:    General Appearance:     alert , shakes head yes but isnt oriented to person or place., yet pleasantly confused, no acute distress.     Head:    Atraumatic and normocephalic, without obvious abnormality.   Eyes:           conjunctivae and sclerae normal, no Icterus. No pallor. Extra-occular movements are within normal limits.   Throat:   No oral lesions   Neck:   Supple, trachea midline, no thyromegaly, no carotid bruit   Lungs:     Chest shape is normal. Breath sounds heard bilaterally equally reduced.  No crackles or wheezing. No Pleural rub or bronchial breathing    Heart:    Normal S1 and S2, no murmur, no gallop, no rub. No JVD   Abdomen:     Normal bowel sounds, no masses, no organomegaly. Soft        non-tender, non-distended, no guarding, no rebound                tenderness   Extremities:   Moves all extremities well, 1+= edema, no cyanosis, no             clubbing   Skin:   No bleeding, bruising or rash   Neurologic:   Gross sensation intact, no tremor. Moves arms left more than right without difficulty         Results Review:     I have reviewed the labs, radiology results and diagnostic studies.    Results from last 7 days  Lab Units 09/02/17  0414   WBC 10*3/mm3 4.31   HEMOGLOBIN g/dL 7.0*  7.0*   PLATELETS 10*3/mm3 163       Results from last 7 days  Lab Units 09/02/17  0414   SODIUM mmol/L 142   POTASSIUM mmol/L 5.4   CHLORIDE mmol/L 111*   CO2 mmol/L 25.0   BUN mg/dL 88*   CREATININE mg/dL 2.00*   GLUCOSE mg/dL 103*   CALCIUM mg/dL 8.4*     Culture Data:   Microbiology Results (last 10 days)     ** No results found for the last 240 hours. **        Radiology Data:   Imaging Results (last 24 hours)     ** No results found for the last 24 hours. **        I have reviewed the medications  Assessment/Plan   Problem List  Hospital Problem List     * (Principal)Accelerated hypertension    Complete heart block    KRISTA with CKD    Bilateral LE cellulitis    Chronic alcohol use    Non-compliance    Altered mental status (Probable metabolic encephalopathy)    ? Cerebral vascular disease (Prior carotid stent)    Elevated troponin (R/O Type 2 NSTEMI)    Hyperlipidemia    CAD (Prior stent)    Alcohol withdrawal    Thrombosis of right cephalic vein    Right arm cellulitis    Anemia of chronic disease    Hypernatremia    Azotemia        Assessment/Plan:  Patient admitted for acute onset confusion, in hypertensive emergency. He was treated for cellulitis, complete heart block and originally admitted to ICU post pacemaker placement. He has continued to have reduced activity, continued confusion and delirium and dysphagia.     1. Acute Delirium, present on admission, with persistant cognitive impairment:   He has persistent intermittent confusion with persistent functional decline. He is able to tell me his name today and that he would prefer not to have keofeed replaced.  He has pulled this out now.     2. Acute kidney injury on CKD : improved but still creatinine 2. Nephrology following.    3. Hypernatremia resolved    4. Dysphagia:pulled his  keofeed. No consent was given for PEG or replacement of keofeed, I have dw daughter and ex wife who is Health care surrogate via phone, Living will states no artificial nutrition desired.  Both live in [place] but are in contact with friend and state it is ok for us to discuss care with friend Everardo. Who has desire to become a guardian  but the friend has not completed paperwork/steps to initiate the process. Social Work following .    5. Lower extremity cellulitis/RUE cellulitis: Improved, s/p completion of doxy and ceftriaxone.    6. Anemia- stool guiac negative and order work up.  Hg down to 7.0 today after IV incident from 7.1 today. I have discussed with EX wife tayla who says in his living will he didn't want blood transfusions.  She is going to have a group conversation with her daughter and friend Everardo and let me know aobut feeding and blood tranfsuions in the next 24 hours.  I shall hold on these for now.  will moniter may need to transfuse if family agreeable.     7. Elevated LFT's  --check Ammonia, hx of etoh abuse, add thiamine and folic acid, stop statin.  abd us pending     Discussed daily with SS. APS has been notified and patient will likely require guardianship legal hearing. Anticipate patient discharge will be extended, pending further long term decision making. I have dw Pt's Daugther and his ex wife who is health care surrogate.  They are all on the same page. Will give me a go ahead to proceed with blood transfusion or alt nutrition within next 24 hours. Likely to snf at discharge.  Not able to do aggressive rehab.  I also need to discuss with family wishes for full code vs dnr.  >1 hour, spent with pt care and phone disucssions today      DVT prophylaxis: heparin  PPX: Pepcid    Discharge Planning: pending  placement.  Code: Full code status    Kati Reynolds, DO   09/02/17   12:01 PM

## 2017-09-02 NOTE — PLAN OF CARE
"Problem: Patient Care Overview (Adult)  Goal: Plan of Care Review  Outcome: Ongoing (interventions implemented as appropriate)    09/02/17 5226   Outcome Evaluation   Outcome Summary/Follow up Plan Dysphagia: Note pt does not wish for PEG or replacement of Keofeed though decisions with pt's family pending. Re-assessed pt to determine if any improvement. Showing no significant change. Intermittent wet voice and cough at baseline - not managing secretions well. Delayed wet cough eventually with all trials. Only immediate coughing (discomfort) with thin and nectar-thick liquids by cup/straw. No signs of discomfort with pudding or thin liquids via tsp. Only wanted a few bites/sips before \"I'm so full\". Pt is not safe for PO intake nor would he likely be able to maintain nutrition via PO alone. If proceed with palliative PO diet, suggest Dysphagia Level 2 Puree diet with thin liquids via tsp, upright with intake, feed only when alert, meds in applesauce - crush as able. Oral care BID and PRN. Will continue to follow as POC decisions made. Thanks    Coping/Psychosocial Response Interventions   Plan Of Care Reviewed With patient           "

## 2017-09-02 NOTE — PLAN OF CARE
Problem: Patient Care Overview (Adult)  Goal: Plan of Care Review  Outcome: Ongoing (interventions implemented as appropriate)    09/02/17 1629   Coping/Psychosocial Response Interventions   Plan Of Care Reviewed With patient   Patient Care Overview   Progress no change       Goal: Adult Individualization and Mutuality  Outcome: Ongoing (interventions implemented as appropriate)    09/02/17 1629   Individualization   Patient Specific Preferences patient states he wants to go home   Patient Specific Goals to go home   Patient Specific Interventions reorient patient, support/repostion       Goal: Discharge Needs Assessment  Outcome: Ongoing (interventions implemented as appropriate)    09/02/17 1629   Discharge Needs Assessment   Concerns To Be Addressed cognitive/perceptual concerns;decision making concerns   Current Discharge Risk cognitively impaired;lives alone   Current Health   Anticipated Changes Related to Illness inability to care for self         Problem: Cellulitis (Adult)  Goal: Signs and Symptoms of Listed Potential Problems Will be Absent or Manageable (Cellulitis)  Outcome: Ongoing (interventions implemented as appropriate)    09/02/17 1629   Cellulitis   Problems Assessed (Cellulitis) all         Problem: Chronic Kidney Disease/End Stage Renal Disease (Adult)  Goal: Signs and Symptoms of Listed Potential Problems Will be Absent or Manageable (Chronic Kidney Disease/End Stage Renal Disease)  Outcome: Ongoing (interventions implemented as appropriate)    09/02/17 1629   Chronic Kidney Disease/End Stage Renal Disease   Problems Assessed (Chronic Kidney Disease/ESRD) all   Problems Present (Chronic Kidney Disease/ESRD) cardiovascular disease;situational response;undernutrition         Problem: Pressure Ulcer (Adult)  Goal: Signs and Symptoms of Listed Potential Problems Will be Absent or Manageable (Pressure Ulcer)  Outcome: Ongoing (interventions implemented as appropriate)    09/02/17 1629   Pressure  Ulcer   Problems Assessed (Pressure Ulcer) all         Problem: Confusion, Acute (Adult)  Goal: Identify Related Risk Factors and Signs and Symptoms  Outcome: Outcome(s) achieved Date Met:  09/02/17 09/02/17 1629   Confusion, Acute   Related Risk Factors (Acute Confusion) advanced age;cognitive impairment;malnutrition;mobility decreased;neurological impairment   Signs and Symptoms (Acute Confusion) disorientation;communication disturbed       Goal: Cognitive/Functional Impairments Minimized  Outcome: Ongoing (interventions implemented as appropriate)    09/02/17 1629   Confusion, Acute (Adult)   Cognitive/Functional Impairments Minimized making progress toward outcome       Goal: Safety  Outcome: Ongoing (interventions implemented as appropriate)    09/02/17 1629   Confusion, Acute (Adult)   Safety making progress toward outcome         Problem: Nutrition, Enteral (Adult)  Goal: Signs and Symptoms of Listed Potential Problems Will be Absent or Manageable (Nutrition, Enteral)  Outcome: Ongoing (interventions implemented as appropriate)    09/02/17 1629   Nutrition, Enteral   Problems Assessed (Enteral Nutrition) all   Problems Present (Enteral Nutrition) aspiration;situational response;misplacement/malposition of feeding tube

## 2017-09-02 NOTE — PLAN OF CARE
Problem: Patient Care Overview (Adult)  Goal: Plan of Care Review  Outcome: Ongoing (interventions implemented as appropriate)    09/01/17 5359   Coping/Psychosocial Response Interventions   Plan Of Care Reviewed With patient   Patient Care Overview   Progress no change

## 2017-09-03 NOTE — PLAN OF CARE
Problem: Patient Care Overview (Adult)  Goal: Plan of Care Review  Outcome: Ongoing (interventions implemented as appropriate)    09/03/17 1255   Outcome Evaluation   Outcome Summary/Follow up Plan New Palliative Team consult for comfort care and help family with goals. ZOILA Jones to see and Palliative will continue to follow for symptoms management, goals and support   Coping/Psychosocial Response Interventions   Plan Of Care Reviewed With patient;caregiver   Patient Care Overview   Progress no change

## 2017-09-03 NOTE — CONSULTS
No Known Provider  Consulting physician: Kati Reynolds  Reason for referral : comfort care, goals of care discussion  Chief Complaint   Patient presents with   • Hypertension   • Slow Heart Rate     HPI:  82 yo male with history of hypertension was brought to ED on  by his friends with worsening confusion. Patient was found to have accelerated hypertension and bradycardic and complete heart block. Cardiology place pacemaker on .   During this hospital course patient has been managed for persisting multifactorial encephalopathy, BLE cellulitis, KRISTA on CKD, hypertension, dysphagia with attempts of artificial nutrition, alcohol withdrawal and RUE edema noted on 8/15. Patient was transferred out of ICU on .  Anemia noted with Hgb 7.0/Hct 22.7 on , previous on  Hgb 8.5/Hct 27.2.  Patient has had further decline in cognitive function over the last few days. No improvement after transfusion.   Noted that patient has completed a living will document, informed that primary designee, Christ Gergorylissilas, is . Patient has been / from his wife, Carrie Pacheco. However she has been willing to continue to act as the patient's healthcare surrogate. Dr Reynolds has had recent discussions to update her on the patient's clinical status, discuss expected outcomes and poor prognosis, in relation to specifics that patient had requested on his living will document.    Decision was made to transfuse 2 units PRBC on .   Symptoms:   Unobtainable, no family at bedside.     Advance care planning discussed: no  Code Status: DNR/DNI conditional  Advance Directive: yes on medical record, reviewed  Surrogate decision maker: Carrie Pacheco  Past Medical History:   Diagnosis Date   • CHF (congestive heart failure)    • Diphtheria     childhood   • Hyperlipidemia    • Hypertension      Past Surgical History:   Procedure Laterality Date   • CARDIAC ELECTROPHYSIOLOGY PROCEDURE N/A 2017    Procedure: Pacemaker;   Surgeon: Jim Mcneil MD;  Location: [facility] INVASIVE LOCATION;  Service:    • CAROTID STENT         Reviewed current scheduled and prn medications for route, type, dose and frequency.    Current Facility-Administered Medications   Medication Dose Route Frequency Provider Last Rate Last Dose   • albuterol (PROVENTIL) nebulizer solution 0.5% 2.5 mg/0.5mL  2.5 mg Nebulization Q6H PRN Leno Fernando MD   2.5 mg at 08/10/17 0023   • amLODIPine (NORVASC) tablet 5 mg  5 mg Oral BID PRN Anabella Kent DO       • carvedilol (COREG) tablet 25 mg  25 mg Oral Q12H Anabella Kent DO   25 mg at 09/02/17 2016   • castor oil-balsam peru (VENELEX) ointment   Topical Q12H Chelita Godoy DO       • docusate sodium (COLACE) liquid 150 mg  150 mg Oral Daily Celia Johnson MD   150 mg at 08/29/17 1010   • folic acid 1 mg in sodium chloride 0.9 % 50 mL IVPB  1 mg Intravenous Daily Jefferson Watts [facility]       • heparin (porcine) 5000 UNIT/ML injection 5,000 Units  5,000 Units Subcutaneous Q12H ZOILA Green   5,000 Units at 09/02/17 2016   • hydrALAZINE (APRESOLINE) tablet 75 mg  75 mg Oral Q8H Gallo Millard MD   75 mg at 09/02/17 2016   • ipratropium-albuterol (DUO-NEB) nebulizer solution 3 mL  3 mL Nebulization Q4H PRN Celia Johnson MD   3 mL at 08/19/17 0531   • miconazole (MICOTIN) 2 % cream   Topical Q12H Leno Fernando MD       • miconazole (MICOTIN) 2 % powder   Topical Q12H Leno Fernando MD       • ondansetron (ZOFRAN) injection 4 mg  4 mg Intravenous Q6H PRN Jim Mcneil MD       • polyethylene glycol (MIRALAX) powder 17 g  17 g Oral Daily Celia Johnson MD   17 g at 08/29/17 1010   • QUEtiapine (SEROquel) tablet 12.5 mg  12.5 mg Oral Q8H PRN Ghulam Bess MD   12.5 mg at 09/01/17 0621   • sodium chloride 0.9 % flush 10 mL  10 mL Intravenous PRN Triage Protocol Emergency, MD       • terazosin (HYTRIN) capsule 5 mg  5 mg Oral Q12H MARAH Garza   5 mg at 09/02/17 2019   • thiamine (B-1)  "100 mg in sodium chloride 0.9 % 100 mL IVPB  100 mg Intravenous Daily Katiiam Reynolds,  mL/hr at 09/02/17 1454 100 mg at 09/02/17 1454        •  albuterol  •  amLODIPine  •  ipratropium-albuterol  •  ondansetron  •  QUEtiapine  •  sodium chloride  No Known Allergies  History reviewed. No pertinent family history.  Social History     Social History   • Marital status:      Spouse name: N/A   • Number of children: N/A   • Years of education: N/A     Occupational History   • Not on file.     Social History Main Topics   • Smoking status: Current Every Day Smoker   • Smokeless tobacco: Not on file   • Alcohol use Yes      Comment: DAILY   • Drug use: No   • Sexual activity: Not on file     Other Topics Concern   • Not on file     Social History Narrative   One daughter, Ju Mckeon, lives in [place]. She has deferred to her mother, Carrie Pacheco.     Review of Systems - +/- per HPI and symptom review.    PPS: 10%  /68  Pulse 82  Temp 98.1 °F (36.7 °C) (Oral)   Resp 17  Ht 72\" (182.9 cm)  Wt 221 lb (100 kg)  SpO2 100%  BMI 29.97 kg/m2  221 lb (100 kg) Body mass index is 29.97 kg/(m^2).  Intake & Output (last day)       09/02 0701 - 09/03 0700 09/03 0701 - 09/04 0700    P.O. 60     Blood 730     IV Piggyback 100     Total Intake(mL/kg) 890 (8.9)     Urine (mL/kg/hr) 1125 (0.5)     Stool 0 (0)     Total Output 1125      Net -235            Unmeasured Stool Occurrence 1 x           Physical Exam:  General Appearance: No acute distress, ill appearing  Head: Normocephalic without obvious abnormality, atraumatic  Eyes: Conjunctivae and sclerae normal, no icterus, JEANNA  Lungs: Clear to auscultation, respirations regular, even and non-labored  Heart: Regular rhythm and normal rate, normal S1  Abdomen: Normal bowel sounds, soft, non-distended, non-tender  Extremities: wrists restrained, no redness, no cyanosis, no edema, no                    bilateral feet sores or wounds  Pulses: Distal pulses palpable " and equal bilaterally  Skin: Warm and dry  Neurological: opens eyes to name, but unable to make eye contact, unable to answer yes/no questions, unable to follow commands     Reviewed labs and diagnostic results.  No results found for: HGBA1C    Results from last 7 days  Lab Units 09/03/17  0826   WBC 10*3/mm3 5.02   HEMOGLOBIN g/dL 8.3*  8.3*   HEMATOCRIT % 25.2*  25.2*   PLATELETS 10*3/mm3 126*       Results from last 7 days  Lab Units 09/03/17  0826  09/01/17  0439   SODIUM mmol/L 143  < > 138   POTASSIUM mmol/L 4.6  < > 4.5   CHLORIDE mmol/L 112*  < > 103   CO2 mmol/L 23.0  < > 25.0   BUN mg/dL 88*  < > 94*   CREATININE mg/dL 2.10*  < > 2.10*   CALCIUM mg/dL 8.5*  < > 8.5*   BILIRUBIN mg/dL  --   --  0.2*   ALK PHOS U/L  --   --  80   ALT (SGPT) U/L  --   --  193*   AST (SGOT) U/L  --   --  149*   GLUCOSE mg/dL 89  < > 134*   < > = values in this interval not displayed.    Results from last 7 days  Lab Units 09/03/17  0826   SODIUM mmol/L 143   POTASSIUM mmol/L 4.6   CHLORIDE mmol/L 112*   CO2 mmol/L 23.0   BUN mg/dL 88*   CREATININE mg/dL 2.10*   GLUCOSE mg/dL 89   CALCIUM mg/dL 8.5*     Imaging Results (last 72 hours)     ** No results found for the last 72 hours. **        Impression: 81 y.o. male with encephalopathy, multifactorial, KRISTA on CKD, dysphagia on comfort feeds, anemia, transaminitis    Plan:   Restlessness - prn ativan, haloperidol, continue to monitor    Delirium - prn seroquel, haloperidol    Goals of care discussion - No discussion held with ex wife today.   Reported in documentation and per Dr Reynolds that patient has a friend, Everardo Chi, that is willing to assume care giving assistance.   Palliative medicine will assist with ongoing clinical decisions.      Nina Ortega, ZOILA  [phone]  09/03/17  9:24 AM      Time: 60 minutes spent reviewing medical and medication records, assessing and examining patient, discussing with nursing staff and primary care team, answering questions,  formulating a plan and documentation of care. > 50% time spent face to face

## 2017-09-03 NOTE — PROGRESS NOTES
"   LOS: 29 days    Patient Care Team:  No Known Provider as PCP - General  No Known Provider as PCP - Family Medicine    Chief Complaint: Acute kidney injury on chronic kidney disease    Subjective   Remains confused and combative.    Review of Systems:   Pleasantly confused and Denies any nausea vomiting diarrhea constipation chest pain or shortness of breath.    Vital Sign Min/Max for last 24 hours  Temp  Min: 96.4 °F (35.8 °C)  Max: 98.5 °F (36.9 °C)   BP  Min: 100/57  Max: 127/68   Pulse  Min: 59  Max: 68   Resp  Min: 15  Max: 20   SpO2  Min: 99 %  Max: 100 %   Flow (L/min)  Min: 2  Max: 2   Weight  Min: 221 lb (100 kg)  Max: 221 lb (100 kg)     Flowsheet Rows         First Filed Value    Admission Height  72\" (182.9 cm) Documented at 08/05/2017 1559    Admission Weight  215 lb (97.5 kg) Documented at 08/05/2017 1559          I/O this shift:  In: 790 [P.O.:60; Blood:730]  Out: 575 [Urine:575]  I/O last 3 completed shifts:  In: 100 [IV Piggyback:100]  Out: 1450 [Urine:1450]    Physical Exam:  General appearance:Patient is awake alert in place and person.  Following commands still mild confusion  HEENT: Eyes pupils are reactive and equal, neck supple, no JVD.  Lungs: Clear to auscultation, no rhonchi's, Rales, equal chest movement, nonlabored.  Heart: No gallop murmur or rub.  Abdomen: Soft nontender megaly positive bowel sounds.  Extremities: Mild dependent lower extremity edema.   CNS: Patient is alert awake following commands and moving all extremities no focal deficit  Psych mild anxiety is noted.  Skin warm dry Positive redness noted right forearm.    WBC WBC   Date Value Ref Range Status   09/02/2017 4.31 3.50 - 10.80 10*3/mm3 Final   09/01/2017 4.64 3.50 - 10.80 10*3/mm3 Final      HGB Hemoglobin   Date Value Ref Range Status   09/02/2017 7.0 (L) 13.1 - 17.5 g/dL Final   09/02/2017 7.0 (L) 13.1 - 17.5 g/dL Final   09/01/2017 7.9 (L) 13.1 - 17.5 g/dL Final   09/01/2017 7.1 (L) 13.1 - 17.5 g/dL Final      HCT " Hematocrit   Date Value Ref Range Status   09/02/2017 22.7 (L) 38.9 - 50.9 % Final   09/02/2017 22.7 (L) 38.9 - 50.9 % Final   09/01/2017 25.7 (L) 38.9 - 50.9 % Final   09/01/2017 22.8 (L) 38.9 - 50.9 % Final      Platlets No results found for: LABPLAT   MCV MCV   Date Value Ref Range Status   09/02/2017 89.7 80.0 - 99.0 fL Final   09/01/2017 88.4 80.0 - 99.0 fL Final          Sodium Sodium   Date Value Ref Range Status   09/02/2017 142 132 - 146 mmol/L Final   09/01/2017 138 132 - 146 mmol/L Final      Potassium Potassium   Date Value Ref Range Status   09/02/2017 5.4 3.5 - 5.5 mmol/L Final   09/01/2017 4.5 3.5 - 5.5 mmol/L Final      Chloride Chloride   Date Value Ref Range Status   09/02/2017 111 (H) 99 - 109 mmol/L Final   09/01/2017 103 99 - 109 mmol/L Final      CO2 CO2   Date Value Ref Range Status   09/02/2017 25.0 20.0 - 31.0 mmol/L Final   09/01/2017 25.0 20.0 - 31.0 mmol/L Final      BUN BUN   Date Value Ref Range Status   09/02/2017 88 (H) 9 - 23 mg/dL Final   09/01/2017 94 (H) 9 - 23 mg/dL Final      Creatinine Creatinine   Date Value Ref Range Status   09/02/2017 2.00 (H) 0.60 - 1.30 mg/dL Final   09/01/2017 2.10 (H) 0.60 - 1.30 mg/dL Final      Calcium Calcium   Date Value Ref Range Status   09/02/2017 8.4 (L) 8.7 - 10.4 mg/dL Final   09/01/2017 8.5 (L) 8.7 - 10.4 mg/dL Final      PO4 No results found for: CAPO4   Albumin Albumin   Date Value Ref Range Status   09/02/2017 2.90 (L) 3.20 - 4.80 g/dL Final   09/01/2017 3.10 (L) 3.20 - 4.80 g/dL Final      Magnesium No results found for: MG   Uric Acid No results found for: URICACID        Results Review:     I reviewed the patient's new clinical results.      carvedilol 25 mg Oral Q12H   castor oil-balsam peru  Topical Q12H   docusate sodium 150 mg Oral Daily   folic acid (FOLVITE) IVPB 1 mg Intravenous Daily   heparin (porcine) 5,000 Units Subcutaneous Q12H   hydrALAZINE 75 mg Oral Q8H   miconazole  Topical Q12H   miconazole  Topical Q12H   polyethylene  glycol 17 g Oral Daily   terazosin 5 mg Oral Q12H   thiamine (VITAMIN B1) IVPB 100 mg Intravenous Daily          Medication Review: Noted above    Assessment/Plan     1- ACUTE KIDNEY INJURY IN CKD STAGE 4--Baseline Cr reportedly low-2's 2015.  Working Dx is HTN'tena Emergency.  Good UOP.  CrCl stable.  Adjust meds for a CrCl 20-25ml/min.  Follow BMP and UOP daily.    2- ANEMIA NOS--Hgb far below target.  Check stool guaiac since BUN staying elevated, which could indicate GI bleed.  Consider transfusing 2units PRBC's today.    Jovanny Arellano MD  09/03/17  6:56 AM

## 2017-09-03 NOTE — PROGRESS NOTES
[facility]       LOS: 29 days   Patient Care Team:  No Known Provider as PCP - General  No Known Provider as PCP - Family Medicine        Subjective       Patient Denies:  Confused but follows commands.  Restrained    Vital Signs  Temp:  [96.4 °F (35.8 °C)-98.5 °F (36.9 °C)] 98 °F (36.7 °C)  Heart Rate:  [59-82] 68  Resp:  [15-20] 17  BP: (100-127)/(52-82) 122/64    Intake/Output Summary (Last 24 hours) at 09/03/17 1055  Last data filed at 09/03/17 0535   Gross per 24 hour   Intake              890 ml   Output             1125 ml   Net             -235 ml          Physical Exam:     General Appearance:    in no acute distress       Neck:   No adenopathy, supple, trachea midline, no JVD       Lungs:    wheezes to auscultation,respirations regular, even and                  unlabored    Heart:    Regular rhythm and normal rate, normal S1 and S2, no            murmur, no gallop, no rub, no click   Chest Wall:    No abnormalities observed   Abdomen:     Normal bowel sounds, no masses, no organomegaly              Extremities:   no edema, no cyanosis, no redness   Pulses:   Pulses palpable and equal bilaterally     Results Review:     I reviewed the patient's new clinical results.      WBC WBC   Date/Time Value Ref Range Status   09/03/2017 0826 5.02 3.50 - 10.80 10*3/mm3 Final   09/02/2017 0414 4.31 3.50 - 10.80 10*3/mm3 Final   09/01/2017 0439 4.64 3.50 - 10.80 10*3/mm3 Final            HGB Hemoglobin   Date/Time Value Ref Range Status   09/03/2017 0826 8.3 (L) 13.1 - 17.5 g/dL Final   09/03/2017 0826 8.3 (L) 13.1 - 17.5 g/dL Final   09/02/2017 0414 7.0 (L) 13.1 - 17.5 g/dL Final   09/02/2017 0414 7.0 (L) 13.1 - 17.5 g/dL Final   09/01/2017 2030 7.9 (L) 13.1 - 17.5 g/dL Final   09/01/2017 0439 7.1 (L) 13.1 - 17.5 g/dL Final           HCT Hematocrit   Date/Time Value Ref Range Status   09/03/2017 0826 25.2 (L) 38.9 - 50.9 % Final   09/03/2017 0826 25.2 (L)  38.9 - 50.9 % Final   09/02/2017 0414 22.7 (L) 38.9 - 50.9 % Final   09/02/2017 0414 22.7 (L) 38.9 - 50.9 % Final   09/01/2017 2030 25.7 (L) 38.9 - 50.9 % Final   09/01/2017 0439 22.8 (L) 38.9 - 50.9 % Final            Platlets No results found for: LABPLAT  Sodium  Sodium   Date/Time Value Ref Range Status   09/03/2017 0826 143 132 - 146 mmol/L Final   09/03/2017 0826 143 132 - 146 mmol/L Final   09/02/2017 0414 142 132 - 146 mmol/L Final   09/01/2017 0439 138 132 - 146 mmol/L Final     Potassium  Potassium   Date/Time Value Ref Range Status   09/03/2017 0826 4.6 3.5 - 5.5 mmol/L Final   09/03/2017 0826 4.6 3.5 - 5.5 mmol/L Final   09/02/2017 0414 5.4 3.5 - 5.5 mmol/L Final   09/01/2017 0439 4.5 3.5 - 5.5 mmol/L Final     Chloride  Chloride   Date/Time Value Ref Range Status   09/03/2017 0826 112 (H) 99 - 109 mmol/L Final   09/03/2017 0826 112 (H) 99 - 109 mmol/L Final   09/02/2017 0414 111 (H) 99 - 109 mmol/L Final   09/01/2017 0439 103 99 - 109 mmol/L Final     BicarbonateNo results found for: PLASMABICARB    BUN BUN   Date/Time Value Ref Range Status   09/03/2017 0826 88 (H) 9 - 23 mg/dL Final   09/03/2017 0826 86 (H) 9 - 23 mg/dL Final   09/02/2017 0414 88 (H) 9 - 23 mg/dL Final   09/01/2017 0439 94 (H) 9 - 23 mg/dL Final      Creatinine Creatinine   Date/Time Value Ref Range Status   09/03/2017 0826 2.10 (H) 0.60 - 1.30 mg/dL Final   09/03/2017 0826 2.20 (H) 0.60 - 1.30 mg/dL Final   09/02/2017 0414 2.00 (H) 0.60 - 1.30 mg/dL Final   09/01/2017 0439 2.10 (H) 0.60 - 1.30 mg/dL Final      Calcium Calcium   Date/Time Value Ref Range Status   09/03/2017 0826 8.5 (L) 8.7 - 10.4 mg/dL Final   09/03/2017 0826 8.4 (L) 8.7 - 10.4 mg/dL Final   09/02/2017 0414 8.4 (L) 8.7 - 10.4 mg/dL Final   09/01/2017 0439 8.5 (L) 8.7 - 10.4 mg/dL Final      Mag No results found for: MG        PT/INR:  No results found for: PROTIME/No results found for: INR  Troponin I     Lab Results   Component Value Date    TROPONINI 0.280 (H)  08/07/2017         carvedilol 25 mg Oral Q12H   castor oil-balsam peru  Topical Q12H   docusate sodium 150 mg Oral Daily   folic acid (FOLVITE) IVPB 1 mg Intravenous Daily   heparin (porcine) 5,000 Units Subcutaneous Q12H   hydrALAZINE 75 mg Oral Q8H   miconazole  Topical Q12H   miconazole  Topical Q12H   polyethylene glycol 17 g Oral Daily   terazosin 5 mg Oral Q12H   thiamine (VITAMIN B1) IVPB 100 mg Intravenous Daily          Assessment/Plan     Patient Active Problem List   Diagnosis Code   • Complete heart block I44.2   • Accelerated hypertension I10   • KRISTA with CKD N17.9   • Bilateral LE cellulitis L03.116   • Chronic alcohol use F10.10   • Non-compliance Z91.19   • Altered mental status (Probable metabolic encephalopathy) R41.82   • ? Cerebral vascular disease (Prior carotid stent) I67.9   • Elevated troponin (R/O Type 2 NSTEMI) R79.89   • Hyperlipidemia E78.5   • CAD (Prior stent) I25.10   • Alcohol withdrawal F10.239   • Thrombosis of right cephalic vein I80.8   • Right arm cellulitis L03.113   • Anemia of chronic disease D63.8   • Hypernatremia E87.0   • Azotemia R79.89     CV no change  Await placement  Will see TuMARAH Tyler  09/03/17  10:55 AM

## 2017-09-03 NOTE — PLAN OF CARE
Problem: Patient Care Overview (Adult)  Goal: Plan of Care Review  Outcome: Ongoing (interventions implemented as appropriate)    09/03/17 1528   Outcome Evaluation   Outcome Summary/Follow up Plan Patient still in restraints, combative, agitated toward providers, friends, and family. Refusing PO intake except wit h meds   Coping/Psychosocial Response Interventions   Plan Of Care Reviewed With patient   Patient Care Overview   Progress no change         Problem: Cellulitis (Adult)  Goal: Signs and Symptoms of Listed Potential Problems Will be Absent or Manageable (Cellulitis)  Outcome: Ongoing (interventions implemented as appropriate)    Problem: Chronic Kidney Disease/End Stage Renal Disease (Adult)  Goal: Signs and Symptoms of Listed Potential Problems Will be Absent or Manageable (Chronic Kidney Disease/End Stage Renal Disease)  Outcome: Ongoing (interventions implemented as appropriate)    Problem: Confusion, Acute (Adult)  Goal: Cognitive/Functional Impairments Minimized  Outcome: Ongoing (interventions implemented as appropriate)  Goal: Safety  Outcome: Ongoing (interventions implemented as appropriate)    Problem: Nutrition, Enteral (Adult)  Goal: Signs and Symptoms of Listed Potential Problems Will be Absent or Manageable (Nutrition, Enteral)  Outcome: Ongoing (interventions implemented as appropriate)
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
## 2017-09-03 NOTE — PLAN OF CARE
Problem: Patient Care Overview (Adult)  Goal: Plan of Care Review  Outcome: Ongoing (interventions implemented as appropriate)    09/03/17 3417   Coping/Psychosocial Response Interventions   Plan Of Care Reviewed With patient   Patient Care Overview   Progress unable to show any progress toward functional goals

## 2017-09-04 NOTE — NURSING NOTE
On the monitor, the heart rates shows up as 120-160, it is counting the pacer as double. Pacemaker is seen, appears to be pacing correctly. It the monitor showing a different rate.

## 2017-09-04 NOTE — PROGRESS NOTES
"   LOS: 30 days    Patient Care Team:  No Known Provider as PCP - General  No Known Provider as PCP - Family Medicine    Chief Complaint: Acute kidney injury on chronic kidney disease    Subjective   Still confused and combative today    Review of Systems:   Unable to obtain d/t MS    Vital Sign Min/Max for last 24 hours  Temp  Min: 96.5 °F (35.8 °C)  Max: 98.2 °F (36.8 °C)   BP  Min: 110/64  Max: 132/69   Pulse  Min: 60  Max: 120   Resp  Min: 16  Max: 18   SpO2  Min: 95 %  Max: 98 %   No Data Recorded   Weight  Min: 221 lb 3.2 oz (100 kg)  Max: 221 lb 3.2 oz (100 kg)     Flowsheet Rows         First Filed Value    Admission Height  72\" (182.9 cm) Documented at 08/05/2017 1559    Admission Weight  215 lb (97.5 kg) Documented at 08/05/2017 1559             I/O last 3 completed shifts:  In: 940 [P.O.:210; Blood:730]  Out: 1875 [Urine:1875]    Physical Exam:  General appearance:Patient is awake alert in place and person.  Following commands still mild confusion  HEENT: Eyes pupils are reactive and equal, neck supple, no JVD.  Lungs: Clear to auscultation, no rhonchi's, Rales, equal chest movement, nonlabored.  Heart: No gallop murmur or rub.  Abdomen: Soft nontender megaly positive bowel sounds.  Extremities: Mild dependent lower extremity edema.   CNS: Patient is alert awake following commands and moving all extremities no focal deficit  Psych mild anxiety is noted.  Skin warm dry Positive redness noted right forearm.    WBC WBC   Date Value Ref Range Status   09/04/2017 4.54 3.50 - 10.80 10*3/mm3 Final   09/03/2017 5.02 3.50 - 10.80 10*3/mm3 Final   09/02/2017 4.31 3.50 - 10.80 10*3/mm3 Final      HGB Hemoglobin   Date Value Ref Range Status   09/04/2017 8.0 (L) 13.1 - 17.5 g/dL Final   09/03/2017 7.7 (L) 13.1 - 17.5 g/dL Final   09/03/2017 8.3 (L) 13.1 - 17.5 g/dL Final   09/03/2017 8.3 (L) 13.1 - 17.5 g/dL Final   09/02/2017 7.0 (L) 13.1 - 17.5 g/dL Final   09/02/2017 7.0 (L) 13.1 - 17.5 g/dL Final   09/01/2017 7.9 " (L) 13.1 - 17.5 g/dL Final      HCT Hematocrit   Date Value Ref Range Status   09/04/2017 24.9 (L) 38.9 - 50.9 % Final   09/03/2017 23.6 (L) 38.9 - 50.9 % Final   09/03/2017 25.2 (L) 38.9 - 50.9 % Final   09/03/2017 25.2 (L) 38.9 - 50.9 % Final   09/02/2017 22.7 (L) 38.9 - 50.9 % Final   09/02/2017 22.7 (L) 38.9 - 50.9 % Final   09/01/2017 25.7 (L) 38.9 - 50.9 % Final      Platlets No results found for: LABPLAT   MCV MCV   Date Value Ref Range Status   09/04/2017 88.9 80.0 - 99.0 fL Final   09/03/2017 89.0 80.0 - 99.0 fL Final   09/02/2017 89.7 80.0 - 99.0 fL Final          Sodium Sodium   Date Value Ref Range Status   09/04/2017 144 132 - 146 mmol/L Final   09/03/2017 143 132 - 146 mmol/L Final   09/03/2017 143 132 - 146 mmol/L Final   09/02/2017 142 132 - 146 mmol/L Final      Potassium Potassium   Date Value Ref Range Status   09/04/2017 4.7 3.5 - 5.5 mmol/L Final   09/03/2017 4.6 3.5 - 5.5 mmol/L Final   09/03/2017 4.6 3.5 - 5.5 mmol/L Final   09/02/2017 5.4 3.5 - 5.5 mmol/L Final      Chloride Chloride   Date Value Ref Range Status   09/04/2017 113 (H) 99 - 109 mmol/L Final   09/03/2017 112 (H) 99 - 109 mmol/L Final   09/03/2017 112 (H) 99 - 109 mmol/L Final   09/02/2017 111 (H) 99 - 109 mmol/L Final      CO2 CO2   Date Value Ref Range Status   09/04/2017 22.0 20.0 - 31.0 mmol/L Final   09/03/2017 23.0 20.0 - 31.0 mmol/L Final   09/03/2017 22.0 20.0 - 31.0 mmol/L Final   09/02/2017 25.0 20.0 - 31.0 mmol/L Final      BUN BUN   Date Value Ref Range Status   09/04/2017 80 (H) 9 - 23 mg/dL Final   09/03/2017 88 (H) 9 - 23 mg/dL Final   09/03/2017 86 (H) 9 - 23 mg/dL Final   09/02/2017 88 (H) 9 - 23 mg/dL Final      Creatinine Creatinine   Date Value Ref Range Status   09/04/2017 2.30 (H) 0.60 - 1.30 mg/dL Final   09/03/2017 2.10 (H) 0.60 - 1.30 mg/dL Final   09/03/2017 2.20 (H) 0.60 - 1.30 mg/dL Final   09/02/2017 2.00 (H) 0.60 - 1.30 mg/dL Final      Calcium Calcium   Date Value Ref Range Status   09/04/2017 8.4  (L) 8.7 - 10.4 mg/dL Final   09/03/2017 8.5 (L) 8.7 - 10.4 mg/dL Final   09/03/2017 8.4 (L) 8.7 - 10.4 mg/dL Final   09/02/2017 8.4 (L) 8.7 - 10.4 mg/dL Final      PO4 No results found for: CAPO4   Albumin Albumin   Date Value Ref Range Status   09/04/2017 3.10 (L) 3.20 - 4.80 g/dL Final   09/03/2017 3.10 (L) 3.20 - 4.80 g/dL Final   09/02/2017 2.90 (L) 3.20 - 4.80 g/dL Final      Magnesium No results found for: MG   Uric Acid No results found for: URICACID        Results Review:     I reviewed the patient's new clinical results.      carvedilol 25 mg Oral Q12H   castor oil-balsam peru  Topical Q12H   docusate sodium 150 mg Oral Daily   folic acid (FOLVITE) IVPB 1 mg Intravenous Daily   heparin (porcine) 5,000 Units Subcutaneous Q12H   hydrALAZINE 75 mg Oral Q8H   miconazole  Topical Q12H   miconazole  Topical Q12H   polyethylene glycol 17 g Oral Daily   terazosin 5 mg Oral Q12H   thiamine (VITAMIN B1) IVPB 100 mg Intravenous Daily          Medication Review: Noted above    Assessment/Plan     1- ACUTE KIDNEY INJURY IN CKD STAGE 4--Baseline Cr reportedly low-2's since 2015.  Working Dx is [facility].  Cr may be around baseline, but BUN has been very high.  Good UOP.  CrCl slightly WORSE today.  Adjust meds for a CrCl 20-25ml/min.  Follow BMP and UOP daily.    2- ANEMIA NOS--Hgb far below target.  Check stool guaiac since BUN staying elevated, which could indicate GI bleed.  Consider transfusing 2units PRBC's today.    Jovanny Arellano MD  09/04/17  8:45 AM
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
## 2017-09-04 NOTE — PLAN OF CARE
Problem: Patient Care Overview (Adult)  Goal: Plan of Care Review  Outcome: Ongoing (interventions implemented as appropriate)    09/04/17 0432   Coping/Psychosocial Response Interventions   Plan Of Care Reviewed With patient   Patient Care Overview   Progress no change

## 2017-09-04 NOTE — PROGRESS NOTES
[facility] Medicine Services  INPATIENT PROGRESS NOTE    Date of Admission: 8/5/2017  Length of Stay: 30  Primary Care Physician: No Known Provider  Subjective   CC: follow up altered mental status    HPI:  Still requiring restraints.  No family present this am       Review Of Systems:   Review of Systems  Unable to obtain secondary to AMS  Objective    Temp:  [96.5 °F (35.8 °C)-97 °F (36.1 °C)] 97 °F (36.1 °C)  Heart Rate:  [60-75] 64  Resp:  [16-18] 18  BP: (110-132)/(59-69) 121/64  Physical Exam   Physical Examination:    General Appearance:     Resting, comfortable  no acute distress.     Head:    Atraumatic and normocephalic, without obvious abnormality.           Neck:   Supple, trachea midline, no thyromegaly, no carotid bruit   Lungs:     Chest shape is normal. Breath sounds heard bilaterally equally reduced.  No crackles or wheezing. No Pleural rub or bronchial breathing    Heart:    Normal S1 and S2, no murmur, no gallop, no rub. No JVD   Abdomen:     Normal bowel sounds, no masses, no organomegaly. non-distended, no guarding, no rebound                  Extremities:    1+= edema, no cyanosis, no clubbing   Skin:   No bleeding, bruising or rash             Results Review:    I have reviewed the labs, radiology results and diagnostic studies.    Results from last 7 days  Lab Units 09/04/17  0453   WBC 10*3/mm3 4.54   HEMOGLOBIN g/dL 8.0*   PLATELETS 10*3/mm3 124*       Results from last 7 days  Lab Units 09/04/17  0453   SODIUM mmol/L 146  144   POTASSIUM mmol/L 4.8  4.7   CHLORIDE mmol/L 115*  113*   CO2 mmol/L 20.0  22.0   BUN mg/dL 84*  80*   CREATININE mg/dL 2.30*  2.30*   GLUCOSE mg/dL 75  75   CALCIUM mg/dL 8.4*  8.4*     Culture Data:   Microbiology Results (last 10 days)     ** No results found for the last 240 hours. **        Radiology Data:   Imaging Results (last 24 hours)     ** No results found for the last 24 hours. **        I have reviewed the  medications  Assessment/Plan   Problem List  Hospital Problem List     * (Principal)Accelerated hypertension    Complete heart block    KRISTA with CKD    Bilateral LE cellulitis    Chronic alcohol use    Non-compliance    Altered mental status (Probable metabolic encephalopathy)    ? Cerebral vascular disease (Prior carotid stent)    Elevated troponin (R/O Type 2 NSTEMI)    Hyperlipidemia    CAD (Prior stent)    Alcohol withdrawal    Thrombosis of right cephalic vein    Right arm cellulitis    Anemia of chronic disease    Hypernatremia    Azotemia        Assessment/Plan:  Patient admitted for acute onset confusion, in hypertensive emergency. He was treated for cellulitis, complete heart block and originally admitted to ICU post pacemaker placement. He has continued to have reduced activity, continued confusion and delirium and dysphagia.     1. Acute Delirium, present on admission, with persistent cognitive impairment:   He has persistent intermittent confusion with persistent functional decline.     2. Acute kidney injury on CKD :  creatinine 2.2 today , Nephrology following.    3. Hypernatremia resolved    4. Dysphagia:pulled his keofeed.  -Long discussion with patient's ex-wife who is the healthcare surrogate and she states that he does not want further artificial feeding or nutrition.  She wishes to continue with comfort diet     5. Lower extremity cellulitis/RUE cellulitis: Improved, s/p completion of doxy and ceftriaxone.    6. Anemia- stool guiac negative. BUN elevated concern for GIB, family agreeable to intermittent blood transfusion if needed.     7. Elevated LFT's  --normal Ammonia, hx of etoh abuse, added thiamine and folic acid, stopped statin.  abd us normal overall, improving off of STATIN continue to monitor    Discussed daily with SS. APS has been notified and patient will likely require guardianship legal hearing. Anticipate patient discharge will be extended, pending further long term decision  making. Dr. Reynolds has dw Pt's Daugther and his ex wife who is health care surrogate.  They are all on the same page.No wishes for alt nutrition per family.  Likely to snf vs home with friend with hospice at discharge.  Not able to do aggressive rehab.       DVT prophylaxis: heparin  PPX: Pepcid    Discharge Planning: possibly home with hospice vs SNF.   Code: DNR    Margarita Payne MD   09/04/17   1:19 PM

## 2017-09-04 NOTE — PLAN OF CARE
Problem: Patient Care Overview (Adult)  Goal: Plan of Care Review  Outcome: Ongoing (interventions implemented as appropriate)    09/04/17 0420   Coping/Psychosocial Response Interventions   Plan Of Care Reviewed With patient   Patient Care Overview   Progress no change

## 2017-09-04 NOTE — PROGRESS NOTES
Adult Nutrition  Assessment/PES    Patient Name:  Franky Pacheco  YOB: 1935  MRN: [id number]  Admit Date:  8/5/2017    Assessment Date:  9/4/2017        Reason for Assessment       09/04/17 1303    Reason for Assessment    Reason For Assessment/Visit follow up protocol    Time Spent (min) 20    Diagnosis --   SEE PREVIOUS NUTRITION NOTES                Nutrition/Diet History       09/04/17 1304    Nutrition/Diet History    Functional Status/Food Prep Mobility --   RN REPORTS PT NOT EATING MUCH & THAT EX-WIFE IS POA & NOT WANTING TO REPLACE FEEDING TUBE. RN IS REQUESTIING I NOT AWAKEN PT (PT CURRENTLY SLEEPING). SHE REPORTS WILL ALERT FOOD SERVICES IF PT HAS ANY FOOD REQUESTS.     Reported/Observed By RN              Labs/Tests/Procedures/Meds       09/04/17 1309    Labs/Tests/Procedures/Meds    Labs/Tests Review Reviewed;Phos    Medication Review Reviewed, pertinent            Physical Findings       09/04/17 1309    Physical Appearance    Gastrointestinal other (see comments)   FIRM ABD PER NURSING DOCUMENTATION              Nutrition Prescription Ordered       09/04/17 1310    Nutrition Prescription PO    Current PO Diet Dysphagia    Dysphagia Level 2  Pureed    Common Modifiers Other (comment)   NO STRAWS            Evaluation of Received Nutrient/Fluid Intake       09/04/17 1310    PO Evaluation    Number of Days PO Intake Evaluated Insufficient Data   NO MEALS RECORDED THE PAST 2 DAYS    EN Evaluation    TF Changes Other (comment);Discontinued   PT PULLED OUT FEEDING TUBE              Problem/Interventions:        Problem 1       09/04/17 1311    Nutrition Diagnoses Problem 1    Problem 1 Inadequate Intake/Infusion    Inadequate Intake Type Oral    Macronutrient Kcal;Protein    Etiology (related to) Other (comment)   CLINICAL CONDITION- GOC NOT YET CONFIRMED    Signs/Symptoms (evidenced by) Report of Mnimal PO Intake    Swallow eval status Done   SLP RECS FOR COMFORT DIET                     Intervention Goal       09/04/17 1313    Intervention Goal    General Nutrition support treatment    PO Establish PO;Increase intake            Nutrition Intervention       09/04/17 1313    Nutrition Intervention    RD/Tech Action Follow Tx progress;Care plan reviewd;Recommend/ordered    Recommended/Ordered Supplement            Nutrition Prescription       09/04/17 1313    Nutrition Prescription PO    Supplement Nepro;Nova Renal    Supplement Frequency 3 times a day    New PO Prescription Ordered? Yes            Education/Evaluation       09/04/17 1314    Monitor/Evaluation    Monitor Per protocol;PO intake;Supplement intake;Pertinent labs;Weight;Skin status;Symptoms        Comments:      Electronically signed by:  Tameka Burger RD  09/04/17 1:14 PM

## 2017-09-05 NOTE — DISCHARGE PLACEMENT REQUEST
"Keara Pacheco (81 y.o. Male)     Date of Birth Social Security Number Address Home Phone MRN    1935  [id number]  [facility] [address] [phone] [id number]    [employer] Marital Status          Other        Admission Date Admission Type Admitting Provider Attending Provider Department, Room/Bed    8/5/17 Emergency Margarita Payne MD [place], Margarita Pitt MD [address], [address]    Discharge Date Discharge Disposition Discharge Destination                      Attending Provider: Margarita Payne MD     Allergies:  No Known Allergies    Isolation:  None   Infection:  None   Code Status:  Comfort Marcelo    Ht:  72\" (182.9 cm)   Wt:  224 lb 11.2 oz (102 kg)    Admission Cmt:  None   Principal Problem:  Accelerated hypertension [I10]                 Active Insurance as of 8/5/2017     Primary Coverage     Payor Plan Insurance Group Employer/Plan Group    MEDICARE MEDICARE A & B      Payor Plan Address Payor Plan Phone Number Effective From Effective To    [address] [phone] 12/1/2001     [place] [address]       Subscriber Name Subscriber Birth Date Member ID       KEARA PACHECO 1935 [id number]           Secondary Coverage     Payor Plan Insurance Group Employer/Plan Group    ANTHEM BLUE CROSS ANTHEM BLUE CROSS BLUE SHIELD PPO [id number]     Payor Plan Address Payor Plan Phone Number Effective From Effective To    [address] [phone] 1/1/2015     [place] [address]       Subscriber Name Subscriber Birth Date Member ID       KEARA PACHECO 1935 [id number]                 Emergency Contacts      (Rel.) Home Phone Work Phone Mobile Phone    Carrie Pacheco (Surrogate) -- -- [phone]    Everardo Chi (Friend) [phone] -- --    Ju Mckeon (Daughter) -- -- [phone]            Emergency Contact Information     Name Relation Home Work Mobile    Carrie Pacheco Surrogate   [phone]    Everardo Chi Friend [phone]      " Ju Mckeon Daughter   [phone]          Insurance Information                MEDICARE/MEDICARE A & B Phone: [phone]    Subscriber: Franky Pacheco Subscriber#: [id number]    Group#:  Precert#:         ANTHEM BLUE CROSS/ANTHEM BLUE CROSS BLUE SHIELD PPO Phone: [phone]    Subscriber: Franky Pacheco Subscriber#: [id number]    Group#: [id number] Precert#:              History & Physical      Everardo Beckman MD at 8/5/2017 10:14 PM            CRITICAL CARE ADMISSION NOTE    Chief Complaint     Accelerated hypertension    History of Present Illness     81-year-old white male brought to the emergency room today by friends due to worsening confusion.    Unfortunately, we have no current records.  He has multiple pill bottles with him which are listed in his medication list but they haven't been filled for many months.  He indicates that he has not seen a primary care physician since his previous primary Care physician, Demetrio Foster, went to work at [employer].    He denies chest pain or palpitations.  He does indicate that he has not been able to care for himself well.    In the emergency room he was noted to have marked hypertension but bradycardia and a high degree AV block.  Also acute on chronic kidney disease and lower extremity edema.  We are asked to admit him to the intensive care unit.    Problem List, Surgical History, Family, Social History, and ROS     Patient Active Problem List   Diagnosis   • Complete heart block   • Accelerated hypertension   • KRISTA with CKD   • Bilateral LE cellulitis   • Chronic alcohol use   • Acute/chronic CHF (R/O Diastolic and/or Systolic)   • Non-compliance   • Altered mental status (Probable metabolic encephalopathy)   • ? Cerebral vascular disease (Prior carotid stent)   • Elevated troponin (R/O Type 2 NSTEMI)   • Hyperlipidemia   • CAD (Prior stent)     Past Surgical History:   Procedure Laterality Date   • CAROTID STENT         Not on File  No current  "facility-administered medications on file prior to encounter.      No current outpatient prescriptions on file prior to encounter.     MEDICATION LIST AND ALLERGIES REVIEWED.    History reviewed. No pertinent family history.  Social History   Substance Use Topics   • Smoking status: Current Every Day Smoker   • Smokeless tobacco: None   • Alcohol use Yes      Comment: DAILY     Social History     Social History Narrative   • No narrative on file     FAMILY AND SOCIAL HISTORY REVIEWED.    Review of Systems  AS ABOVE OR ALL OTHER SYSTEMS REVIEWED AND ARE NEGATIVE.    Physical Exam and Clinical Information   BP 94/70  Pulse (!) 40  Temp 98.3 °F (36.8 °C) (Oral)   Resp 18  Ht 72\" (182.9 cm)  Wt 215 lb (97.5 kg)  SpO2 (!) 84%  BMI 29.16 kg/m2  Physical Exam:   GENERAL: Awake, no distress   HEENT: No adenopathy or thyromegaly.  Sclera nonicteric   LUNGS: Decreased breath sounds without wheezes or rhonchi   HEART: Irregular bradycardic with no overt murmurs   ABDOMEN: Soft.  Nontender.  Faint bowel sounds noted   EXTREMITIES: Marked abnormalities of toes with neglect of toenails and foot cleansing.  Trace palpable pulses.  Chronic venous insufficiency changes with some skin breakdown in the right pretibial area.  Bilateral pretibial erythema and mild warmth without drainage   NEURO/PSYCH: Alert, cooperative, no focal motor deficit and no facial asymmetry.  Oriented to person and place.  Intermittently agitated.      Results from last 7 days  Lab Units 08/05/17  1600 08/05/17  1554   WBC 10*3/mm3  --  7.13   HEMOGLOBIN g/dL  --  10.9*   HEMOGLOBIN, POC g/dL 10.9*  --    PLATELETS 10*3/mm3  --  202       Results from last 7 days  Lab Units 08/05/17  1600 08/05/17  1554   SODIUM mmol/L  --  141   POTASSIUM mmol/L  --  4.1   CO2 mmol/L  --  23.0   BUN mg/dL  --  46*   CREATININE mg/dL 3.40* 3.20*   MAGNESIUM mg/dL  --  2.2   GLUCOSE mg/dL  --  104*     Estimated Creatinine Clearance: 21 mL/min (by C-G formula based on " Cr of 3.4).          No results found for: LACTATE     IMAGES: Cardiomegaly without overt HF changes.  No effusions or consolidation    I reviewed the patient's results and images.     \[facility]\"" Problem List     * (Principal)Accelerated hypertension    Acute/chronic CHF (R/O Diastolic and/or Systolic)    Elevated troponin (R/O Type 2 NSTEMI)    KRISTA with CKD    Altered mental status (Probable metabolic encephalopathy)    Complete heart block    Bilateral LE cellulitis    Chronic alcohol use    Non-compliance    ? Cerebral vascular disease (Prior carotid stent)    Hyperlipidemia    CAD (Prior stent)        Plan/Recommendations     Admit to the intensive care unit  Blood pressure control  Cardiology consultation for elevated troponin and high degree AV block  Transthoracic echocardiogram  Renal ultrasound  Thiamine, folate, multivitamin  Ativan for possible alcohol withdrawal symptoms  Antibiotics for cellulitis  Wound care assessment  Podiatry consultation    Critical Care time spent in direct patient care: 45 minutes (excluding procedure time, if applicable) including high complexity decision making to assess, manipulate, and support vital organ system failure in this individual who has impairment of one or more vital organ systems such that there is a high probability of imminent or life threatening deterioration in the patient’s condition.    SOL Beckman MD  Pulmonary and Critical Care Medicine     CC: No Known Provider     Electronically signed by Everardo Beckman MD at 8/5/2017 10:18 PM

## 2017-09-05 NOTE — PROGRESS NOTES
"   LOS: 31 days    Patient Care Team:  No Known Provider as PCP - General  No Known Provider as PCP - Family Medicine    Chief Complaint: Acute kidney injury on chronic kidney disease    Subjective   He was just looking at me without answering any questions was not very verbal    Review of Systems:   Unable to obtain d/t MS    Vital Sign Min/Max for last 24 hours  Temp  Min: 97.8 °F (36.6 °C)  Max: 98.1 °F (36.7 °C)   BP  Min: 112/53  Max: 128/59   Pulse  Min: 60  Max: 85   Resp  Min: 18  Max: 18   SpO2  Min: 97 %  Max: 98 %   Flow (L/min)  Min: 2  Max: 2   Weight  Min: 224 lb 11.2 oz (102 kg)  Max: 224 lb 11.2 oz (102 kg)     Flowsheet Rows         First Filed Value    Admission Height  72\" (182.9 cm) Documented at 08/05/2017 1559    Admission Weight  215 lb (97.5 kg) Documented at 08/05/2017 1559          I/O this shift:  In: 180 [P.O.:180]  Out: -   I/O last 3 completed shifts:  In: 0   Out: 700 [Urine:700]    Physical Exam:  General appearance:Patient is awake alert in place and person.  Following commands still mild confusion  HEENT: Eyes pupils are reactive and equal, neck supple, no JVD.  Lungs: Clear to auscultation, no rhonchi's, Rales, equal chest movement, nonlabored.  Heart: No gallop murmur or rub.  Abdomen: Soft nontender megaly positive bowel sounds.  Extremities: Mild dependent lower extremity edema.   CNS: Patient is alert awake following commands and moving all extremities no focal deficit  Psych mild anxiety is noted.  Skin warm dry Positive redness noted right forearm.    WBC WBC   Date Value Ref Range Status   09/05/2017 4.68 3.50 - 10.80 10*3/mm3 Final   09/04/2017 4.54 3.50 - 10.80 10*3/mm3 Final   09/03/2017 5.02 3.50 - 10.80 10*3/mm3 Final      HGB Hemoglobin   Date Value Ref Range Status   09/05/2017 7.4 (L) 13.1 - 17.5 g/dL Final   09/04/2017 7.2 (L) 13.1 - 17.5 g/dL Final   09/04/2017 8.0 (L) 13.1 - 17.5 g/dL Final   09/03/2017 7.7 (L) 13.1 - 17.5 g/dL Final   09/03/2017 8.3 (L) 13.1 - " 17.5 g/dL Final   09/03/2017 8.3 (L) 13.1 - 17.5 g/dL Final      HCT Hematocrit   Date Value Ref Range Status   09/05/2017 23.8 (L) 38.9 - 50.9 % Final   09/04/2017 22.5 (L) 38.9 - 50.9 % Final   09/04/2017 24.9 (L) 38.9 - 50.9 % Final   09/03/2017 23.6 (L) 38.9 - 50.9 % Final   09/03/2017 25.2 (L) 38.9 - 50.9 % Final   09/03/2017 25.2 (L) 38.9 - 50.9 % Final      Platlets No results found for: LABPLAT   MCV MCV   Date Value Ref Range Status   09/05/2017 90.2 80.0 - 99.0 fL Final   09/04/2017 88.9 80.0 - 99.0 fL Final   09/03/2017 89.0 80.0 - 99.0 fL Final          Sodium Sodium   Date Value Ref Range Status   09/05/2017 147 (H) 132 - 146 mmol/L Final   09/04/2017 144 132 - 146 mmol/L Final   09/04/2017 146 132 - 146 mmol/L Final   09/03/2017 143 132 - 146 mmol/L Final   09/03/2017 143 132 - 146 mmol/L Final      Potassium Potassium   Date Value Ref Range Status   09/05/2017 4.3 3.5 - 5.5 mmol/L Final   09/04/2017 4.7 3.5 - 5.5 mmol/L Final   09/04/2017 4.8 3.5 - 5.5 mmol/L Final   09/03/2017 4.6 3.5 - 5.5 mmol/L Final   09/03/2017 4.6 3.5 - 5.5 mmol/L Final      Chloride Chloride   Date Value Ref Range Status   09/05/2017 113 (H) 99 - 109 mmol/L Final   09/04/2017 113 (H) 99 - 109 mmol/L Final   09/04/2017 115 (H) 99 - 109 mmol/L Final   09/03/2017 112 (H) 99 - 109 mmol/L Final   09/03/2017 112 (H) 99 - 109 mmol/L Final      CO2 CO2   Date Value Ref Range Status   09/05/2017 22.0 20.0 - 31.0 mmol/L Final   09/04/2017 22.0 20.0 - 31.0 mmol/L Final   09/04/2017 20.0 20.0 - 31.0 mmol/L Final   09/03/2017 23.0 20.0 - 31.0 mmol/L Final   09/03/2017 22.0 20.0 - 31.0 mmol/L Final      BUN BUN   Date Value Ref Range Status   09/05/2017 83 (H) 9 - 23 mg/dL Final   09/04/2017 80 (H) 9 - 23 mg/dL Final   09/04/2017 84 (H) 9 - 23 mg/dL Final   09/03/2017 88 (H) 9 - 23 mg/dL Final   09/03/2017 86 (H) 9 - 23 mg/dL Final      Creatinine Creatinine   Date Value Ref Range Status   09/05/2017 2.50 (H) 0.60 - 1.30 mg/dL Final    09/04/2017 2.30 (H) 0.60 - 1.30 mg/dL Final   09/04/2017 2.30 (H) 0.60 - 1.30 mg/dL Final   09/03/2017 2.10 (H) 0.60 - 1.30 mg/dL Final   09/03/2017 2.20 (H) 0.60 - 1.30 mg/dL Final      Calcium Calcium   Date Value Ref Range Status   09/05/2017 8.3 (L) 8.7 - 10.4 mg/dL Final   09/04/2017 8.4 (L) 8.7 - 10.4 mg/dL Final   09/04/2017 8.4 (L) 8.7 - 10.4 mg/dL Final   09/03/2017 8.5 (L) 8.7 - 10.4 mg/dL Final   09/03/2017 8.4 (L) 8.7 - 10.4 mg/dL Final      PO4 No results found for: CAPO4   Albumin Albumin   Date Value Ref Range Status   09/04/2017 3.10 (L) 3.20 - 4.80 g/dL Final   09/04/2017 3.10 (L) 3.20 - 4.80 g/dL Final   09/03/2017 3.10 (L) 3.20 - 4.80 g/dL Final      Magnesium Magnesium   Date Value Ref Range Status   09/04/2017 2.7 1.3 - 2.7 mg/dL Final      Uric Acid No results found for: URICACID        Results Review:     I reviewed the patient's new clinical results.      carvedilol 25 mg Oral Q12H   castor oil-balsam peru  Topical Q12H   docusate sodium 150 mg Oral Daily   folic acid (FOLVITE) IVPB 1 mg Intravenous Daily   heparin (porcine) 5,000 Units Subcutaneous Q12H   hydrALAZINE 75 mg Oral Q8H   miconazole  Topical Q12H   miconazole  Topical Q12H   polyethylene glycol 17 g Oral Daily   terazosin 5 mg Oral Q12H   thiamine (VITAMIN B1) IVPB 100 mg Intravenous Daily          Medication Review: Noted above    Assessment/Plan     1- ACUTE KIDNEY INJURY IN CKD STAGE 4--Baseline Cr reportedly low-2's since 2015.  Working Dx is [facility].  Cr may be around baseline, but BUN has been very high.  Not much po intake .  CrCl slightly WORSE today.  Adjust meds for a CrCl 20-25ml/min.  Follow BMP and UOP daily.    2- ANEMIA NOS--Hgb far below target.  Check stool guaiac since BUN staying elevated, which could indicate GI bleed.  Consider transfusing 2units PRBC's.    3. Hypernatremia; Likely due to not having much po water    Discussed With the staff RN, I agree with palliative as he is heading in the wrong  direction.  Please let us know if family wants to be a little bit more aggressively can give him IV D5W to bring his sodium down that might help his renal function but he may want to fluid overload.    Kun Cohen MD  09/05/17  10:41 AM

## 2017-09-05 NOTE — PROGRESS NOTES
[facility]       LOS: 31 days   Patient Care Team:  No Known Provider as PCP - General  No Known Provider as PCP - Family Medicine        Subjective       Patient Denies:  Asleep    Vital Signs  Temp:  [97.8 °F (36.6 °C)-98.1 °F (36.7 °C)] 98.1 °F (36.7 °C)  Heart Rate:  [60-85] 62  Resp:  [18] 18  BP: (112-128)/(53-59) 125/59    Intake/Output Summary (Last 24 hours) at 09/05/17 1108  Last data filed at 09/05/17 0900   Gross per 24 hour   Intake              180 ml   Output              700 ml   Net             -520 ml     I/O this shift:  In: 180 [P.O.:180]  Out: -     Physical Exam:     General Appearance:    in no acute distress       Neck:   No adenopathy, supple, trachea midline, no JVD       Lungs:    rhonchi to auscultation,respirations regular, even and                  unlabored    Heart:    Regular rhythm and normal rate, normal S1 and S2, no            murmur, no gallop, no rub, no click   Chest Wall:    No abnormalities observed   Abdomen:     Normal bowel sounds, no masses, no organomegaly              Extremities:   no edema, no cyanosis, no redness   Pulses:   Pulses palpable and equal bilaterally     Results Review:     I reviewed the patient's new clinical results.      WBC WBC   Date/Time Value Ref Range Status   09/05/2017 0504 4.68 3.50 - 10.80 10*3/mm3 Final   09/04/2017 0453 4.54 3.50 - 10.80 10*3/mm3 Final   09/03/2017 0826 5.02 3.50 - 10.80 10*3/mm3 Final            HGB Hemoglobin   Date/Time Value Ref Range Status   09/05/2017 0504 7.4 (L) 13.1 - 17.5 g/dL Final   09/04/2017 2008 7.2 (L) 13.1 - 17.5 g/dL Final   09/04/2017 0453 8.0 (L) 13.1 - 17.5 g/dL Final   09/03/2017 2006 7.7 (L) 13.1 - 17.5 g/dL Final   09/03/2017 0826 8.3 (L) 13.1 - 17.5 g/dL Final   09/03/2017 0826 8.3 (L) 13.1 - 17.5 g/dL Final           HCT Hematocrit   Date/Time Value Ref Range Status   09/05/2017 0504 23.8 (L) 38.9 - 50.9 % Final   09/04/2017 2008  22.5 (L) 38.9 - 50.9 % Final   09/04/2017 0453 24.9 (L) 38.9 - 50.9 % Final   09/03/2017 2006 23.6 (L) 38.9 - 50.9 % Final   09/03/2017 0826 25.2 (L) 38.9 - 50.9 % Final   09/03/2017 0826 25.2 (L) 38.9 - 50.9 % Final            Platlets No results found for: LABPLAT  Sodium  Sodium   Date/Time Value Ref Range Status   09/05/2017 0504 147 (H) 132 - 146 mmol/L Final   09/04/2017 0453 144 132 - 146 mmol/L Final   09/04/2017 0453 146 132 - 146 mmol/L Final   09/03/2017 0826 143 132 - 146 mmol/L Final   09/03/2017 0826 143 132 - 146 mmol/L Final     Potassium  Potassium   Date/Time Value Ref Range Status   09/05/2017 0504 4.3 3.5 - 5.5 mmol/L Final   09/04/2017 0453 4.7 3.5 - 5.5 mmol/L Final   09/04/2017 0453 4.8 3.5 - 5.5 mmol/L Final   09/03/2017 0826 4.6 3.5 - 5.5 mmol/L Final   09/03/2017 0826 4.6 3.5 - 5.5 mmol/L Final     Chloride  Chloride   Date/Time Value Ref Range Status   09/05/2017 0504 113 (H) 99 - 109 mmol/L Final   09/04/2017 0453 113 (H) 99 - 109 mmol/L Final   09/04/2017 0453 115 (H) 99 - 109 mmol/L Final   09/03/2017 0826 112 (H) 99 - 109 mmol/L Final   09/03/2017 0826 112 (H) 99 - 109 mmol/L Final     BicarbonateNo results found for: PLASMABICARB    BUN BUN   Date/Time Value Ref Range Status   09/05/2017 0504 83 (H) 9 - 23 mg/dL Final   09/04/2017 0453 80 (H) 9 - 23 mg/dL Final   09/04/2017 0453 84 (H) 9 - 23 mg/dL Final   09/03/2017 0826 88 (H) 9 - 23 mg/dL Final   09/03/2017 0826 86 (H) 9 - 23 mg/dL Final      Creatinine Creatinine   Date/Time Value Ref Range Status   09/05/2017 0504 2.50 (H) 0.60 - 1.30 mg/dL Final   09/04/2017 0453 2.30 (H) 0.60 - 1.30 mg/dL Final   09/04/2017 0453 2.30 (H) 0.60 - 1.30 mg/dL Final   09/03/2017 0826 2.10 (H) 0.60 - 1.30 mg/dL Final   09/03/2017 0826 2.20 (H) 0.60 - 1.30 mg/dL Final      Calcium Calcium   Date/Time Value Ref Range Status   09/05/2017 0504 8.3 (L) 8.7 - 10.4 mg/dL Final   09/04/2017 0453 8.4 (L) 8.7 - 10.4 mg/dL Final   09/04/2017 0453 8.4 (L) 8.7 -  10.4 mg/dL Final   09/03/2017 0826 8.5 (L) 8.7 - 10.4 mg/dL Final   09/03/2017 0826 8.4 (L) 8.7 - 10.4 mg/dL Final      Mag Magnesium   Date/Time Value Ref Range Status   09/04/2017 0453 2.7 1.3 - 2.7 mg/dL Final           PT/INR:  No results found for: PROTIME/No results found for: INR  Troponin I     Lab Results   Component Value Date    TROPONINI 0.280 (H) 08/07/2017         carvedilol 25 mg Oral Q12H   castor oil-balsam peru  Topical Q12H   docusate sodium 150 mg Oral Daily   folic acid (FOLVITE) IVPB 1 mg Intravenous Daily   heparin (porcine) 5,000 Units Subcutaneous Q12H   hydrALAZINE 75 mg Oral Q8H   miconazole  Topical Q12H   miconazole  Topical Q12H   polyethylene glycol 17 g Oral Daily   terazosin 5 mg Oral Q12H   thiamine (VITAMIN B1) IVPB 100 mg Intravenous Daily          Assessment/Plan     Patient Active Problem List   Diagnosis Code   • Complete heart block I44.2   • Accelerated hypertension I10   • KRISTA with CKD N17.9   • Bilateral LE cellulitis L03.116   • Chronic alcohol use F10.10   • Non-compliance Z91.19   • Altered mental status (Probable metabolic encephalopathy) R41.82   • ? Cerebral vascular disease (Prior carotid stent) I67.9   • Elevated troponin (R/O Type 2 NSTEMI) R79.89   • Hyperlipidemia E78.5   • CAD (Prior stent) I25.10   • Alcohol withdrawal F10.239   • Thrombosis of right cephalic vein I80.8   • Right arm cellulitis L03.113   • Anemia of chronic disease D63.8   • Hypernatremia E87.0   • Azotemia R79.89     CV no change  Await placement    MARAH Garza  09/05/17  11:08 AM

## 2017-09-05 NOTE — PLAN OF CARE
Problem: Patient Care Overview (Adult)  Goal: Plan of Care Review  Outcome: Ongoing (interventions implemented as appropriate)    09/05/17 0054   Coping/Psychosocial Response Interventions   Plan Of Care Reviewed With patient   Patient Care Overview   Progress declining         Problem: Cellulitis (Adult)  Goal: Signs and Symptoms of Listed Potential Problems Will be Absent or Manageable (Cellulitis)  Outcome: Ongoing (interventions implemented as appropriate)    Problem: Chronic Kidney Disease/End Stage Renal Disease (Adult)  Goal: Signs and Symptoms of Listed Potential Problems Will be Absent or Manageable (Chronic Kidney Disease/End Stage Renal Disease)  Outcome: Ongoing (interventions implemented as appropriate)    Problem: Confusion, Acute (Adult)  Goal: Cognitive/Functional Impairments Minimized  Outcome: Ongoing (interventions implemented as appropriate)

## 2017-09-05 NOTE — PLAN OF CARE
Problem: Patient Care Overview (Adult)  Goal: Plan of Care Review  Outcome: Ongoing (interventions implemented as appropriate)    09/05/17 1130   Outcome Evaluation   Outcome Summary/Follow up Plan Patient opens his eyes, answers no to pain, and being worried, he is congested, Mr. Rodríguez at bedside. We reveiwed patient's Living Will , labs on a computer outside room. per Carrie Pacheco request. He states he supports comfort plan of care, ok with hospice and states he is seeking guardianship.   Coping/Psychosocial Response Interventions   Plan Of Care Reviewed With patient  (Everardo Lennon, Friend)   Patient Care Overview   Progress declining

## 2017-09-05 NOTE — PROGRESS NOTES
Continued Stay Note  [facility]     Patient Name: Franky Pacheco  MRN: [id number]  Today's Date: 2017    Admit Date: 2017          Discharge Plan       17 1448    Case Management/Social Work Plan    Additional Comments Received call earlier today from hospital nursing staff, Everardo Chi at bedside requesting physician statement in order to petition court for emergency guardianship.  Medical record reviewed-Mr. Pacheco now comfort care with plan to transfer to inpatient Hospice care.  Does not appear to be an emergency situation requiring Mr. Pacheco have a guardian appointed by the court.  Discussed with [facility] worker Breanna Ta, who is in agreement  Mr. Pacheco with decision maker in place, (Carrie Pacheco).  Have left message for Carrie Pacheco to contact me to discuss Guardianship process/need further.      17 1428    Case Management/Social Work Plan    Plan Hospice/Comfort Care    Patient/Family In Agreement With Plan --   Carrie Pacheco, HC Surrogate    Additional Comments S/P lengthy conversation last Friday (17) with HC Surrogate, Carrie Pacheco (# [phone]).  Discussed patient's care to date, patient medical status, nursing home placement process, Medicare/Medicaid coverage of nursing home care, HC surrogate role/guardianship, [facility] criteria/admission process.  Ex-wife reported she and her father are listed as Mr. Pacheco's HC Surrogates and her father is . Reported Everardo Chi was a close friend and she wished to give permission for hospital staff to discuss Mr. De Oliveiras medical condition/care with him.  I discussed situation to date with Dr. Kati Reynolds on Friday and she contacted Carrie Pacheco to discuss goals of care.                   Discharge Codes     None        Expected Discharge Date and Time     Expected Discharge Date Expected Discharge Time    Sep 8, 2017             CHEL Fang

## 2017-09-05 NOTE — PLAN OF CARE
Problem: Patient Care Overview (Adult)  Goal: Plan of Care Review  Outcome: Ongoing (interventions implemented as appropriate)    09/05/17 3564   Outcome Evaluation   Outcome Summary/Follow up Plan pt less reponsive today , continues to be combative when waking , remains in restraints . will continue to monitor and give comfort care    Coping/Psychosocial Response Interventions   Plan Of Care Reviewed With friend   Patient Care Overview   Progress declining         Problem: Cellulitis (Adult)  Goal: Signs and Symptoms of Listed Potential Problems Will be Absent or Manageable (Cellulitis)  Outcome: Ongoing (interventions implemented as appropriate)    Problem: Chronic Kidney Disease/End Stage Renal Disease (Adult)  Goal: Signs and Symptoms of Listed Potential Problems Will be Absent or Manageable (Chronic Kidney Disease/End Stage Renal Disease)  Outcome: Ongoing (interventions implemented as appropriate)    Problem: Pressure Ulcer (Adult)  Goal: Signs and Symptoms of Listed Potential Problems Will be Absent or Manageable (Pressure Ulcer)  Outcome: Ongoing (interventions implemented as appropriate)    Problem: Confusion, Acute (Adult)  Goal: Cognitive/Functional Impairments Minimized  Outcome: Ongoing (interventions implemented as appropriate)    Problem: Nutrition, Enteral (Adult)  Goal: Signs and Symptoms of Listed Potential Problems Will be Absent or Manageable (Nutrition, Enteral)  Outcome: Ongoing (interventions implemented as appropriate)

## 2017-09-05 NOTE — PLAN OF CARE
Problem: Patient Care Overview (Adult)  Goal: Plan of Care Review  Outcome: Ongoing (interventions implemented as appropriate)    09/05/17 1025   Outcome Evaluation   Outcome Summary/Follow up Plan Dysphagia Update: Min intake, mainly PO meds crushed in applesauce per RN. Showing no obvious discomfort with meds. Note likely to move IP Hospice. Currently no further needs from SLP. Nursing managing well. SLP to sign off, if any further issues/needs, please reconsult.          Problem: Inpatient SLP  Goal: Dysphagia- Patient will improve swallowing skills to the level that a repeat evaluation is indicated  Outcome: Unable to achieve outcome(s) by discharge Date Met:  09/05/17 08/29/17 1502 09/01/17 0924 09/05/17 1025   Repeat Evaluation Needed   Swallow Skills to Level that Repeat Evaluation Indicated- SLP, Date Established 08/29/17 --  --    Swallow Skills to Level that Repeat Evaluation Indicated- SLP, Time to Achieve by discharge --  --    Swallow Skills to Level that Repeat Evaluation Indicated- SLP, Date Goal Reviewed --  09/01/17 --    Swallow Skills to Level that Repeat Evaluation Indicated- SLP, Outcome --  --  goal no longer appropriate

## 2017-09-05 NOTE — PLAN OF CARE
Problem: Patient Care Overview (Adult)  Goal: Plan of Care Review  Outcome: Ongoing (interventions implemented as appropriate)    09/05/17 1449   Outcome Evaluation   Outcome Summary/Follow up Plan Pt's progress is limited 2/2 decreased alertness/agitation. PT to d/c pt at this time. Reconsult if status improves.   Coping/Psychosocial Response Interventions   Plan Of Care Reviewed With patient         Problem: Inpatient Physical Therapy  Goal: Bed Mobility Goal LTG- PT  Outcome: Unable to achieve outcome(s) by discharge Date Met:  09/05/17 09/05/17 1449   Bed Mobility PT LTG   Bed Mobility PT LTG, Outcome goal not met   Bed Mobility PT LTG, Reason Goal Not Met unable to make needed progress;progress slower than expected       Goal: Transfer Training Goal 1 LTG- PT  Outcome: Unable to achieve outcome(s) by discharge Date Met:  09/05/17 08/13/17 1113 08/27/17 0904 09/05/17 1449   Transfer Training PT LTG   Transfer Training PT LTG, Date Established 08/13/17 --  --    Transfer Training PT LTG, Time to Achieve 2 wks --  --    Transfer Training PT LTG, Activity Type sit to stand/stand to sit --  --    Transfer Training PT LTG, [place] Level minimum assist (75% patient effort) --  --    Transfer Training PT LTG, Assist Device walker, rolling --  --    Transfer Training PT LTG, Date Goal Reviewed --  08/27/17 --    Transfer Training PT LTG, Outcome --  --  goal not met   Transfer Training PT LTG, Reason Goal Not Met --  --  unable to make needed progress;medical status inhibits participation       Goal: Static Sitting Balance Goal LTG- PT  Outcome: Unable to achieve outcome(s) by discharge Date Met:  09/05/17 08/13/17 1113 08/23/17 1602 08/27/17 0904   Static Sitting Balance PT LTG   Static Sitting Balance PT LTG, Date Established 08/13/17 --  --    Static Sitting Balance PT LTG, Time to Achieve 2 wks --  --    Static Sitting Balance PT LTG, [place] Level independent --  --    Static Sitting Balance PT  LTG, Date Goal Reviewed --  --  08/27/17   Static Sitting Balance PT LTG, Reason Goal Not Met --  progress slower than expected --

## 2017-09-05 NOTE — THERAPY TREATMENT NOTE
Acute Care - Occupational Therapy Treatment Note  [facility]     Patient Name: Franky Pacheco  : 1935  MRN: [id number]  Today's Date: 2017  Onset of Illness/Injury or Date of Surgery Date: 17  Date of Referral to OT: 17  Referring Physician: DO Freddy      Admit Date: 2017    Visit Dx:     ICD-10-CM ICD-9-CM   1. Pharyngeal dysphagia R13.13 787.23   2. Complete heart block I44.2 426.0   3. Uncontrolled hypertension I10 401.9   4. Impaired functional mobility, balance, gait, and endurance Z74.09 V49.89   5. Impaired mobility and ADLs Z74.09 799.89     Patient Active Problem List   Diagnosis   • Complete heart block   • Accelerated hypertension   • KRISTA with CKD   • Bilateral LE cellulitis   • Chronic alcohol use   • Non-compliance   • Altered mental status (Probable metabolic encephalopathy)   • ? Cerebral vascular disease (Prior carotid stent)   • Elevated troponin (R/O Type 2 NSTEMI)   • Hyperlipidemia   • CAD (Prior stent)   • Alcohol withdrawal   • Thrombosis of right cephalic vein   • Right arm cellulitis   • Anemia of chronic disease   • Hypernatremia   • Azotemia             Adult Rehabilitation Note       17 1428 17 1418 17 0935    Rehab Assessment/Intervention    Discipline occupational therapist  -AN physical therapist  -EH speech language pathologist  -SM    Document Type therapy note (daily note);other (see comments)  -AN therapy note (daily note)  - discharge summary  -SM    Subjective Information decreased LOC  -AN  decreased LOC  -SM    Patient Effort, Rehab Treatment poor  -AN      Symptoms Noted During/After Treatment --   agitation with increased movment  -AN other (see comments)   increased alertness with agitation  -     Symptoms Noted Comment confusion  -AN confusion  -     Precautions/Limitations fall precautions;other (see comments);oxygen therapy device and L/min   restraints  -AN      Recorded by [AN] Lexi Duarte OT [] Sara SABILLON  Lukas, PT [SM] Ronna Moreno MS CCC-SLP    Pain Assessment    Pain Assessment Unable to assess  -AN Unable to assess  -     Response to Interventions  pt appears to be sleeping; squeezes hand of PT when cued; increased agitation with attempt at mobility.  -EH     Recorded by [AN] Lexi Duarte OT [] Sara Gaytan, PT     Vision Assessment/Intervention    Vision Comment pt would only open eyes briefly  -AN      Recorded by [AN] Lexi Duarte OT      Cognitive Assessment/Intervention    Current Cognitive/Communication Assessment impaired  -AN impaired  -     Orientation Status unable/difficult to assess  -AN      Follows Commands/Answers Questions --   pt would not follow any instructions  -AN --   < 5% following cueing/answering questions.  -EH     Personal Safety severe impairment  -AN severe impairment  -     Personal Safety Interventions  fall prevention program maintained  -EH     Recorded by [AN] Lexi Duarte OT [] Sara Gaytan, PT     Swallow Assessment/Intervention    Additional Documentation   Dysphagia/Swallow Intervention (Group)  -SM    Recorded by   [SM] Ronna Moreno MS [facility]-SLP    Dysphagia/Swallow Intervention    Dysphagia/Swallow Intervention   Min intake, mainly PO meds crushed in applesauce per RN.  Showing no obvious discomfort with meds.  Note likely to move IP Hospice.  Currently no further needs from SLP.  Nursing managing well.  SLP to sign off, if any further issues/needs, please reconsult.   -SM    Recorded by   [SM] Ronna Moreno MS CCC-SLP    Bed Mobility, Assessment/Treatment    Bed Mobility, Assistive Device  draw sheet  -     Bed Mobility, Roll Left, [place]ount dependent (less than 25% patient effort)   pt agitated and resisting  -AN dependent (less than 25% patient effort);2 person assist required   unable to fully roll 2/2 increased agitation  -     Bed Mobility, Scoot/Bridge, [place] dependent (less than 25% patient effort);2  person assist required  -AN dependent (less than 25% patient effort);2 person assist required  -EH     Bed Mobility, Comment Extensive cues to get pt to agree to EOB, pt became agitated and resisted further movement  -AN Extensive VC/ warnings. PT req. pt verbalize yes or no prior to attempt.   -EH     Recorded by [AN] Lexi Duarte OT [EH] Sara Gaytan, PT     Transfer Assessment/Treatment    Transfer, Comment unable to perform, pt refused  -AN unable to perform; increased agitation.  -EH     Recorded by [AN] Lexi Duarte, OT [EH] Sara Gaytan PT     Gait Assessment/Treatment    Gait, Bartholomew Level  unable to perform  -EH     Recorded by  [EH] Sara Gaytan PT     Grooming Assessment/Training    Grooming Assess/Train, Indepen Level dependent (less than 25% patient effort)  -AN      Grooming Assess/Train, Comment wash face  -AN      Recorded by [AN] Lexi Duarte OT      Therapy Exercises    Bilateral Lower Extremities  AAROM:;10 reps;ankle pumps/circles;5 reps;PROM:;hamstring stretch;heel slides   resists straightening of knees.  -EH     Bilateral Upper Extremity --   pt resistant to perform  -AN      Recorded by [AN] Lexi Duarte OT [] Sara Gaytan PT     Positioning and Restraints    Pre-Treatment Position in bed  -AN in bed  -EH     Post Treatment Position bed  -AN bed  -EH     In Bed supine;call light within reach;exit alarm on;encouraged to call for assist;waffle boots/both;RUE elevated;LUE elevated;legs elevated  -AN supine;call light within reach;encouraged to call for assist;with nsg;RUE elevated;waffle boots/both   1/4 turn to L  -EH     Recorded by [AN] Lexi Duarte, OT [EH] Sara Gaytan PT       User Key  (r) = Recorded By, (t) = Taken By, (c) = Cosigned By    Initials Name Effective Dates     Sara Gaytan PT 06/19/15 -      Ronna Moreno, MS CCC-SLP 06/22/15 -     JUAN Duarte OT 06/22/15 -                 OT Goals       09/05/17 1557  08/30/17 1540 08/28/17 1357    Transfer Training OT LTG    Transfer Training OT LTG, Outcome  goal ongoing  -AC goal partially met   met chair to bed  -AC    Transfer Training OT LTG, Reason Goal Not Met unable to make needed progress  -AN      Dynamic Sitting Balance OT LTG    Dynamic Sitting Balance OT LTG, Outcome  goal ongoing  -AC goal ongoing  -AC    Dynamic Sitting Balance OT LTG, Reason Goal Not Met unable to make needed progress  -AN      Bathing OT LTG    Bathing Goal OT LTG, Outcome  goal ongoing  -AC goal ongoing  -AC    Bathing Goal OT LTG, Reason Goal Not Met unable to make needed progress  -AN      Grooming OT LTG    Grooming Goal OT LTG, Outcome  goal ongoing  -AC goal ongoing  -AC    Grooming Goal OT LTG, Reason Goal Not Met unable to make needed progress  -AN        08/27/17 0859 08/24/17 0939 08/24/17 0936    Transfer Training OT LTG    Transfer Training OT LTG, Date Goal Reviewed 08/27/17  -AR      Transfer Training OT LTG, Outcome  (P)  goal ongoing  -AC goal ongoing  -AC    Dynamic Sitting Balance OT LTG    Dynamic Sitting Balance OT LTG, Date Goal Reviewed 08/27/17  -AR      Dynamic Sitting Balance OT LTG, Outcome  (P)  goal ongoing  -AC goal ongoing  -AC    Bathing OT LTG    Bathing Goal OT LTG, Time to Achieve 1 wk  -AR      Bathing Goal OT LTG, Activity Type simulates;upper body bathing  -AR      Bathing Goal OT LTG, [place] Level moderate assist (50% patient effort);verbal cues required;nonverbal cues required (demo/gesture)  -AR      Bathing Goal OT LTG, Outcome  (P)  goal ongoing  -AC goal ongoing  -AC    Grooming OT LTG    Grooming Goal OT LTG, Time to Achieve 1 wk  -AR      Grooming Goal OT LTG, [place] Level supervision required;verbal cues required;nonverbal cues required (demo/gesture)  -AR      Grooming Goal OT LTG, Position sitting, edge of bed  -AR      Grooming Goal OT LTG, Date Goal Reviewed 08/27/17  -AR      Grooming Goal OT LTG, Outcome  (P)  goal ongoing  -AC goal  ongoing  -AC      17 1119          Transfer Training OT LTG    Transfer Training OT LTG, Outcome goal ongoing   progressing STS  -AC      Dynamic Sitting Balance OT LTG    Dynamic Sitting Balance OT LTG, Outcome goal ongoing  -AC      Bathing OT LTG    Bathing Goal OT LTG, Outcome goal ongoing  -AC      Grooming OT LTG    Grooming Goal OT LTG, Adaptive Equipment toothbrush, electric  -AC      Grooming Goal OT LTG, Outcome goal partially met   met to wash face  -AC        User Key  (r) = Recorded By, (t) = Taken By, (c) = Cosigned By    Initials Name Provider Type    AC Lillie Taylor, OT Occupational Therapist    JUAN Duarte, OT Occupational Therapist    STEPHANIE Clifford, OT Occupational Therapist          Occupational Therapy Education     Title: PT OT SLP Therapies (Active)     Topic: Occupational Therapy (Active)     Point: ADL training (Active)    Description: Instruct learner(s) on proper safety adaptation and remediation techniques during self care or transfers.   Instruct in proper use of assistive devices.    Learning Progress Summary    Learner Readiness Method Response Comment Documented by Status   Patient Nonacceptance E NL Attempts to educate pt on mobility and therapeutic activities. AN 17 1556 Active    Nonacceptance E NR   17 1540 Active    Acceptance E NR benefits of activity, role of OT, safety with walker AC 17 1356 Active    Acceptance E,TB,D NR Reviewed role of OT, goals of , ADL retraining, BUE HEP, bed mobility AR 17 0859 Active    Refuses E NR benefits of activity AC 17 0935 Active    Acceptance E NR benefits of activity, safety with sit to stand transfer AC 17 1118 Active    Acceptance E NR benefits of activity  17 1531 Active    Nonacceptance E NL Attempt to educate pt on benefits of therapeutic ex and activities. AN 17 1352 Active    Nonacceptance E NR Educated on benefits of therapeutic ex and activities. AN 17  1524 Active    Nonacceptance E NL Educated on OT role, current functiional deficits. AN 08/15/17 1055 Active               Point: Home exercise program (Active)    Description: Instruct learner(s) on appropriate technique for monitoring, assisting and/or progressing therapeutic exercises/activities.    Learning Progress Summary    Learner Readiness Method Response Comment Documented by Status   Patient Acceptance E,TB,D NR Reviewed role of OT, goals of , ADL retraining, BUE HEP, bed mobility AR 17 0859 Active               Point: Precautions (Active)    Description: Instruct learner(s) on prescribed precautions during self-care and functional transfers.    Learning Progress Summary    Learner Readiness Method Response Comment Documented by Status   Patient Acceptance E,TB,D NR Reviewed role of OT, goals of , ADL retraining, BUE HEP, bed mobility AR 17 0859 Active                      User Key     Initials Effective Dates Name Provider Type Discipline     06/23/15 -  Lillie Taylor, OT Occupational Therapist OT    AN 06/22/15 -  Lexi Duarte, OT Occupational Therapist OT    AR 06/22/15 -  Roxie Clifford, OT Occupational Therapist OT                  OT Recommendation and Plan  Anticipated Equipment Needs At Discharge: bathing equipment, dressing equipment, bedside commode, reacher, tub bench, front wheeled walker  Anticipated Discharge Disposition: skilled nursing facility  Therapy Frequency: 3 times/wk (per priority policy)  Plan of Care Review  Plan Of Care Reviewed With: patient  Progress: unable to show any progress toward functional goals  Outcome Summary/Follow up Plan: Pt resistant to any mobility, ex or ADL's this date. Pt. was sleeping upon arrival, difficult to wake up but refused any tx once awake. Pt needs to get OOB, but pt is not able to make any progress. Will hold OT at this time.        Outcome Measures       17 1428 17 1418       How much help from another person  do you currently need...    Turning from your back to your side while in flat bed without using bedrails?  1  -EH     Moving from lying on back to sitting on the side of a flat bed without bedrails?  1  -EH     Moving to and from a bed to a chair (including a wheelchair)?  1  -EH     Standing up from a chair using your arms (e.g., wheelchair, bedside chair)?  1  -EH     Climbing 3-5 steps with a railing?  1  -EH     To walk in hospital room?  1  -EH     AM-PAC 6 Clicks Score  6  -EH     How much help from another is currently needed...    Putting on and taking off regular lower body clothing? 1  -AN      Bathing (including washing, rinsing, and drying) 1  -AN      Toileting (which includes using toilet bed pan or urinal) 1  -AN      Putting on and taking off regular upper body clothing 1  -AN      Taking care of personal grooming (such as brushing teeth) 1  -AN      Eating meals 1  -AN      Score 6  -AN      Functional Assessment    Outcome Measure Options  AM-PAC 6 Clicks Basic Mobility (PT)  -EH       User Key  (r) = Recorded By, (t) = Taken By, (c) = Cosigned By    Initials Name Provider Type     Sara Gaytan, PT Physical Therapist    JUAN Duarte OT Occupational Therapist           Time Calculation:         Time Calculation- OT       09/05/17 1559          Time Calculation- OT    OT Start Time 1428  -AN      Total Timed Code Minutes- OT 8 minute(s)  -AN      OT Received On 09/05/17  -AN        User Key  (r) = Recorded By, (t) = Taken By, (c) = Cosigned By    Initials Name Provider Type    JUAN Duarte OT Occupational Therapist           Therapy Charges for Today     Code Description Service Date Service Provider Modifiers Qty    [id number]  OT THERAPEUTIC ACT EA 15 MIN 9/5/2017 Lexi Duarte OT GO 1               Lexi Duarte OT  9/5/2017

## 2017-09-05 NOTE — PROGRESS NOTES
Continued Stay Note  [facility]     Patient Name: Franky Pacheco  MRN: [id number]  Today's Date: 2017    Admit Date: 2017          Discharge Plan       17 1428    Case Management/Social Work Plan    Plan Hospice/Comfort Care    Patient/Family In Agreement With Plan --   Carrie Pacheco, HC Surrogate    Additional Comments S/P lengthy conversation last Friday (17) with HC Surrogate, Carrie Junior (# [phone]).  Discussed patient's care to date, patient medical status, nursing home placement process, Medicare/Medicaid coverage of nursing home care, HC surrogate role/guardianship, [facility] criteria/admission process.  Ex-wife reported she and her father are listed as Mr. Pacheco's HC Surrogates and her father is . Reported Everardo Chi was a close friend and she wished to give her permission for hospital staff to discuss Mr. De Oliveiras medical condition/care with him.  I discussed situation to date with Dr. Kati Reynolds on Friday and she contacted Carrie Carsonris to discuss goals of care.  Tanna Lind, Ext. [phone]                   Discharge Codes     None        Expected Discharge Date and Time     Expected Discharge Date Expected Discharge Time    Sep 8, 2017             CHEL Fang

## 2017-09-05 NOTE — PROGRESS NOTES
"Palliative Care Progress Note    Date of Admission: 2017    Code Status: DNR/DNI, conditional. Reviewed current clinical status and code status with Carrie Pacheco over telephone and agreed to change to AND, comfort.   Advance Directive: living will is completed and on medical record, reviewed with Carrie Pacheco. Reported that Christ Mendez is her father and he  2016.  Surrogate decision maker: Carrie Pacheco ([phone])    Subjective:  Patient is non verbal.   Noted 25% intake per breakfast I & O report.   Reviewed current scheduled and prn medications for route, type, dose, and frequency.     •  albuterol  •  amLODIPine  •  haloperidol lactate  •  ipratropium-albuterol  •  LORazepam  •  ondansetron  •  QUEtiapine  •  sodium chloride    Objective:  PPS 20% /68  Pulse (!) 130  Temp 98.1 °F (36.7 °C) (Axillary)   Resp 18  Ht 72\" (182.9 cm)  Wt 224 lb 11.2 oz (102 kg)  SpO2 96%  BMI 30.47 kg/m2   Intake & Output (last day)        07 -  07 07 -  0700    P.O. 0 180    Total Intake(mL/kg) 0 (0) 180 (1.8)    Urine (mL/kg/hr) 700 (0.3) 400 (0.4)    Total Output 700 400    Net -700 -220          Unmeasured Urine Occurrence 2 x     Unmeasured Stool Occurrence 1 x         Lab Results (last 24 hours)     Procedure Component Value Units Date/Time    Hemoglobin & Hematocrit, Blood [[id number]]  (Abnormal) Collected:  17    Specimen:  Blood Updated:  17     Hemoglobin 7.2 (L) g/dL      Hematocrit 22.5 (L) %     CBC (No Diff) [[id number]]  (Abnormal) Collected:  17 0504    Specimen:  Blood Updated:  17 0532     WBC 4.68 10*3/mm3      RBC 2.64 (L) 10*6/mm3      Hemoglobin 7.4 (L) g/dL      Hematocrit 23.8 (L) %      MCV 90.2 fL      MCH 28.0 pg      MCHC 31.1 (L) g/dL      RDW 16.4 (H) %      RDW-SD 52.8 fl      MPV 12.1 (H) fL      Platelets 119 (L) 10*3/mm3     Basic Metabolic Panel [[id number]]  (Abnormal) Collected:  17 9215    Specimen:  " Blood Updated:  09/05/17 0626     Glucose 75 mg/dL      BUN 83 (H) mg/dL      Creatinine 2.50 (H) mg/dL      Sodium 147 (H) mmol/L      Potassium 4.3 mmol/L      Chloride 113 (H) mmol/L      CO2 22.0 mmol/L      Calcium 8.3 (L) mg/dL      eGFR Non African Amer 25 (L) mL/min/1.73      BUN/Creatinine Ratio 33.2 (H)     Anion Gap 12.0 (H) mmol/L     Narrative:       [employer] Guidelines    Stage     Description        GFR  1         Normal or High     90+  2         Mild decrease      60-89  3         Moderate decrease  30-59  4         Severe decrease    15-29  5         Kidney failure     <15        Imaging Results (last 24 hours)     ** No results found for the last 24 hours. **          Physical Exam:  Gen: NAD, resting in bed  Skin: warm, dry   Eyes: JEANNA, conjunctiva clear and moist   Resp/thorax: even effort, non labored, CTA   CV: RRR   ABD: soft, bowel sounds +, non distended  Ext: no edema, no redness, wrists restrained  Neuro: awake, unable to follow commands, withdraws to pain, made eye contact with name but does not sustain with any further simple communication, no myoclonus       Reviewed labs and diagnostic results.   No results found for: HGBA1C    Results from last 7 days  Lab Units 09/05/17  0504   WBC 10*3/mm3 4.68   HEMOGLOBIN g/dL 7.4*   HEMATOCRIT % 23.8*   PLATELETS 10*3/mm3 119*       Results from last 7 days  Lab Units 09/05/17  0504 09/04/17  0453   SODIUM mmol/L 147* 146  144   POTASSIUM mmol/L 4.3 4.8  4.7   CHLORIDE mmol/L 113* 115*  113*   CO2 mmol/L 22.0 20.0  22.0   BUN mg/dL 83* 84*  80*   CREATININE mg/dL 2.50* 2.30*  2.30*   CALCIUM mg/dL 8.3* 8.4*  8.4*   BILIRUBIN mg/dL  --  0.2*   ALK PHOS U/L  --  91   ALT (SGPT) U/L  --  118*   AST (SGOT) U/L  --  61*   GLUCOSE mg/dL 75 75  75       Impression: 81 y.o. male with metabolic encephalopathy, KRISTA with CKD stage 4 with worsening renal function, dysphagia on comfort feeds, anemia s/p transfusion 9/2.     Plan:    AMS, delirium - continue to monitor, with goal to be able to remove wrists restraints.     Restlessness - prn seroquel effective.     Goals of care discussion - spoke to healthcare surrogate and ex wife, Carrie Pacheco.   She is in close communication with the patient's daughter, Ju Mckeon. Carrie and their daughter, Ju, last saw the patient last year at the  for Christ Mendez, Carrie's father.   Both have been available and participating in shared decision making with the hospitalist during this prolonged hospital course.   Reviewed current clinical status with worsening renal function despite medical management.  Reviewed patient's wishes as outlined in his living will and what would be the patient's concerns for his quality of life. With the patient's persistent depressed cognitive status Carrie and the patient's daughter, Ju, are in agreement to transition to comfort focused plan of care and allow the patient to be with less monitoring and telemetry attached to him. Hopefully with goal of removing wrists restraints.   Agreed to make hospice referral. Spoke with hospice team, since Carrie is located in [place] as far as being available to sign papers for electing hospice Medicare benefit.     Time: 30 minutes   > 50% time spent in counseling and education concerning current orders for symptom management with healthcare surrogate, Carrie Pacheco.     Nina Ortega, APRN  [phone]  17  5:28 PM

## 2017-09-05 NOTE — PROGRESS NOTES
[facility]  INPATIENT PROGRESS NOTE    Date of Admission: 8/5/2017  Length of Stay: 31  Primary Care Physician: No Known Provider  Subjective   CC: follow up altered mental status    HPI:  Still requiring restraints.  No family present this am. Pt nonverbal, eyes open at time of visit. Spoke with Jazmine with palliative care, plans to discuss goals of care/inpt hospice with healthcare surrogate today including desire to tranfuse,. nsg reports no acute events overnight.      Review Of Systems:   Review of Systems   Eyes: Positive for itching.     Unable to obtain secondary to AMS  Objective    Temp:  [97.8 °F (36.6 °C)-98.1 °F (36.7 °C)] 98.1 °F (36.7 °C)  Heart Rate:  [60-85] 85  Resp:  [18] 18  BP: (112-128)/(53-59) 125/59  Physical Exam   Physical Examination:    General Appearance:     Resting, comfortable  no acute distress. Awake, nonverbal     Head:    Atraumatic and normocephalic, without obvious abnormality.           Neck:   Supple, trachea midline   Lungs:     Chest shape is normal. Breath sounds heard bilaterally equally reduced.  Diffuse expiratory wheezing. No Pleural rub or bronchial breathing. Respirations nonlabored    Heart:    Ventricular paced rhythm on telemetry, no murmur, no gallop, no rub. No JVD   Abdomen:     Normal bowel sounds, no masses, no organomegaly. non-distended, no guarding, no rebound                  Extremities:    Trace bilat LE edema, no cyanosis, no clubbing; wearing protective boots    Neuro: nonverbal, does not  follow commands   Skin:   No bleeding, bruising or rash             Results Review:    I have reviewed the labs, radiology results and diagnostic studies.    Results from last 7 days  Lab Units 09/05/17  0504   WBC 10*3/mm3 4.68   HEMOGLOBIN g/dL 7.4*   PLATELETS 10*3/mm3 119*       Results from last 7 days  Lab Units 09/05/17  0504   SODIUM mmol/L 147*   POTASSIUM mmol/L 4.3   CHLORIDE mmol/L 113*   CO2 mmol/L 22.0   BUN  mg/dL 83*   CREATININE mg/dL 2.50*   GLUCOSE mg/dL 75   CALCIUM mg/dL 8.3*     Culture Data:   Microbiology Results (last 10 days)     ** No results found for the last 240 hours. **        Radiology Data:   Imaging Results (last 24 hours)     ** No results found for the last 24 hours. **        I have reviewed the medications  Assessment/Plan   Problem List  Hospital Problem List     * (Principal)Accelerated hypertension    Complete heart block    KRISTA with CKD    Bilateral LE cellulitis    Chronic alcohol use    Non-compliance    Altered mental status (Probable metabolic encephalopathy)    ? Cerebral vascular disease (Prior carotid stent)    Elevated troponin (R/O Type 2 NSTEMI)    Hyperlipidemia    CAD (Prior stent)    Alcohol withdrawal    Thrombosis of right cephalic vein    Right arm cellulitis    Anemia of chronic disease    Hypernatremia    Azotemia        Assessment/Plan:  Patient admitted for acute onset confusion, in hypertensive emergency. He was treated for cellulitis, complete heart block and originally admitted to ICU post pacemaker placement. He has continued to have reduced activity, continued confusion and delirium and dysphagia.     1. Acute Delirium, present on admission, with persistent cognitive impairment:   He has persistent intermittent confusion with persistent functional decline.     2. Acute kidney injury on CKD :  creatinine 2.5 today , Nephrology following.    3. Hypernatremia resolved. Monitor with poor po intake.      4. Dysphagia:pulled his keofeed.  -Long discussion with patient's ex-wife who is the healthcare surrogate and she states that he does not want further artificial feeding or nutrition.  She wishes to continue with comfort diet     5. Lower extremity cellulitis/RUE cellulitis: Improved, s/p completion of doxy and ceftriaxone.    6. Anemia- stool guiac negative. BUN elevated concern for GIB. Family now interested in inpt hospice, palliative discussing goals of care.       7.  Elevated LFT's  --normal Ammonia, hx of etoh abuse, on thiamine and folic acid, stopped statin.  abd us normal overall, improving off of STATIN continue to monitor    8 Wheezes  -duonebs prn, nsg contacting RT for tx    Discussed daily with SS. APS has been notified and patient will likely require guardianship legal hearing. Anticipate patient discharge will be extended, pending further long term decision making. Dr. Reynolds has dw Pt's Daugther and his ex wife who is health care surrogate.  They are all on the same page.No wishes for alt nutrition per family. Palliative care discussing goals of care with pts health care surrogate, evaluating for inpt hospice.  Not able to do aggressive rehab.       DVT prophylaxis: heparin  PPX: Pepcid    Discharge Planning: possibly home with hospice vs SNF.   Code: DNR    Casie M Mayne, PA-C   09/05/17   10:07 AM

## 2017-09-05 NOTE — THERAPY DISCHARGE NOTE
Acute Care - Speech Language Pathology   Swallow Treatment Note/Discharge    [place]     Patient Name: Franky Pacheco  : 1935  MRN: [id number]  Today's Date: 2017  Onset of Illness/Injury or Date of Surgery Date: 17            Admit Date: 2017    Visit Dx:      ICD-10-CM ICD-9-CM   1. Pharyngeal dysphagia R13.13 787.23   2. Complete heart block I44.2 426.0   3. Uncontrolled hypertension I10 401.9   4. Impaired functional mobility, balance, gait, and endurance Z74.09 V49.89   5. Impaired mobility and ADLs Z74.09 799.89     Patient Active Problem List   Diagnosis   • Complete heart block   • Accelerated hypertension   • KRISTA with CKD   • Bilateral LE cellulitis   • Chronic alcohol use   • Non-compliance   • Altered mental status (Probable metabolic encephalopathy)   • ? Cerebral vascular disease (Prior carotid stent)   • Elevated troponin (R/O Type 2 NSTEMI)   • Hyperlipidemia   • CAD (Prior stent)   • Alcohol withdrawal   • Thrombosis of right cephalic vein   • Right arm cellulitis   • Anemia of chronic disease   • Hypernatremia   • Azotemia             Adult Rehabilitation Note       17 0935          Rehab Assessment/Intervention    Discipline speech language pathologist  -SM      Document Type discharge summary  -SM      Subjective Information decreased LOC  -SM      Recorded by [SM] Ronna Moreno MS CCC-SLP      Swallow Assessment/Intervention    Additional Documentation Dysphagia/Swallow Intervention (Group)  -SM      Recorded by [SM] Ronna Moreno MS CCC-SLP      Dysphagia/Swallow Intervention    Dysphagia/Swallow Intervention Min intake, mainly PO meds crushed in applesauce per RN.  Showing no obvious discomfort with meds.  Note likely to move IP Hospice.  Currently no further needs from SLP.  Nursing managing well.  SLP to sign off, if any further issues/needs, please reconsult.   -SM      Recorded by [SM] Ronna Moreno MS CCC-SLP        User Key  (r) =  Recorded By, (t) = Taken By, (c) = Cosigned By    Initials Name Effective Dates    SUNITHA Moreno MS CCC-SLP 06/22/15 -               IP SLP Goals       09/05/17 1025 09/01/17 0924 08/31/17 1035    Repeat Evaluation Needed    Swallow Skills to Level that Repeat Evaluation Indicated- SLP, Date Goal Reviewed  09/01/17  -RD 08/31/17  -RD    Swallow Skills to Level that Repeat Evaluation Indicated- SLP, Outcome goal no longer appropriate  -SM goal ongoing  -RD goal ongoing  -RD    Swallow Skills to Level that Repeat Evaluation Indicated- SLP, Reason Goal Not Met  progress slower than expected  -RD       08/29/17 1502 08/28/17 1429 08/25/17 1350    Safely Consume Diet    Safely Consume Diet- SLP, Date Goal Reviewed   08/25/17  -RD    Safely Consume Diet- SLP, Outcome   goal no longer appropriate  -RD    Begin to Take Some PO Safely    Begin to Take Some PO Safely- SLP, Date Established   08/25/17  -RD    Begin to Take Some PO Safely- SLP, Time to Achieve   by discharge  -RD    Begin to Take Some PO Safely- SLP, Date Goal Reviewed 08/29/17  -RD 08/28/17  -RD 08/25/17  -RD    Begin to Take Some PO Safely- SLP, Outcome goal met  -RD goal ongoing  -RD goal ongoing  -RD    Repeat Evaluation Needed    Swallow Skills to Level that Repeat Evaluation Indicated- SLP, Date Established 08/29/17  -RD      Swallow Skills to Level that Repeat Evaluation Indicated- SLP, Time to Achieve by discharge  -RD      Swallow Skills to Level that Repeat Evaluation Indicated- SLP, Date Goal Reviewed 08/29/17  -RD      Swallow Skills to Level that Repeat Evaluation Indicated- SLP, Outcome goal ongoing  -RD        08/24/17 1603          Safely Consume Diet    Safely Consume Diet- SLP, Date Established 08/24/17  -HG      Safely Consume Diet- SLP, Outcome goal ongoing  -HG        User Key  (r) = Recorded By, (t) = Taken By, (c) = Cosigned By    Initials Name Provider Type    SUNITHA Moreno MS CCC-SLP Speech and Language Pathologist     ENOC Mendiola, MS CCC-SLP Speech and Language Pathologist    ROSA Khan, MS CF-SLP Speech and Language Pathologist          EDUCATION  The patient's nurse has been educated in the following areas:   Dysphagia (Swallowing Impairment) Modified Diet Instruction.    SLP Recommendation and Plan        Plan of Care Review  Plan Of Care Reviewed With: patient  Outcome Summary/Follow up Plan: Dysphagia Update:  Min intake, mainly PO meds crushed in applesauce per RN.  Showing no obvious discomfort with meds.  Note likely to move IP Hospice.  Currently no further needs from SLP.  Nursing managing well.  SLP to sign off, if any further issues/needs, please reconsult.            Time Calculation:         Time Calculation- SLP       09/05/17 1026          Time Calculation- SLP    SLP Start Time 0935  -      SLP Received On 09/05/17  -        User Key  (r) = Recorded By, (t) = Taken By, (c) = Cosigned By    Initials Name Provider Type    SUNITHA Moreno MS CCC-SLP Speech and Language Pathologist          Therapy Charges for Today     Code Description Service Date Service Provider Modifiers Qty    [id number] HC ST TREATMENT SWALLOW 1 9/5/2017 Ronna Moreno MS CCC-SLP GN 1                    Ronna Moreno MS CCC-SLP  9/5/2017

## 2017-09-05 NOTE — PLAN OF CARE
Problem: Patient Care Overview (Adult)  Goal: Plan of Care Review  Outcome: Revised    09/05/17 1557   Outcome Evaluation   Outcome Summary/Follow up Plan Pt resistant to any mobility, ex or ADL's this date. Pt. was sleeping upon arrival, difficult to wake up but refused any tx once awake. Pt needs to get OOB, but pt is not able to make any progress. Will hold OT at this time.   Coping/Psychosocial Response Interventions   Plan Of Care Reviewed With patient   Patient Care Overview   Progress unable to show any progress toward functional goals         Problem: Inpatient Occupational Therapy  Goal: Transfer Training Goal 1 LTG- OT  Outcome: Revised    08/15/17 1056 08/27/17 0859 08/30/17 1540   Transfer Training OT LTG   Transfer Training OT LTG, Date Established 08/15/17 --  --    Transfer Training OT LTG, Time to Achieve 1 wk --  --    Transfer Training OT LTG, Activity Type bed to chair /chair to bed --  --    Transfer Training OT LTG, [place] Level moderate assist (50% patient effort);2 person assist required --  --    Transfer Training OT LTG, Date Goal Reviewed --  08/27/17 --    Transfer Training OT LTG, Outcome --  --  goal ongoing   Transfer Training OT LTG, Reason Goal Not Met --  --  --      09/05/17 1557   Transfer Training OT LTG   Transfer Training OT LTG, Date Established --    Transfer Training OT LTG, Time to Achieve --    Transfer Training OT LTG, Activity Type --    Transfer Training OT LTG, [place] Level --    Transfer Training OT LTG, Date Goal Reviewed --    Transfer Training OT LTG, Outcome --    Transfer Training OT LTG, Reason Goal Not Met unable to make needed progress       Goal: Dynamic Sitting Balance Goal LTG- OT  Outcome: Revised    08/15/17 1056 08/27/17 0859 08/30/17 1540   Dynamic Sitting Balance OT LTG   Dynamic Sitting Balance OT LTG, Date Established 08/15/17 --  --    Dynamic Sitting Balance OT LTG, Time to Achieve 1 wk --  --    Dynamic Sitting Balance OT LTG,  [place] Level minimum assist (75% patient effort) --  --    Dynamic Sitting Balance OT LTG, Date Goal Reviewed --  08/27/17 --    Dynamic Sitting Balance OT LTG, Outcome --  --  goal ongoing   Dynamic Sitting Balance OT LTG, Reason Goal Not Met --  --  --      09/05/17 1557   Dynamic Sitting Balance OT LTG   Dynamic Sitting Balance OT LTG, Date Established --    Dynamic Sitting Balance OT LTG, Time to Achieve --    Dynamic Sitting Balance OT LTG, [place] Level --    Dynamic Sitting Balance OT LTG, Date Goal Reviewed --    Dynamic Sitting Balance OT LTG, Outcome --    Dynamic Sitting Balance OT LTG, Reason Goal Not Met unable to make needed progress       Goal: Bathing Goal LTG- OT  Outcome: Revised    08/19/17 1352 08/27/17 0859 08/30/17 1540   Bathing OT LTG   Bathing Goal OT LTG, Date Established 08/19/17 --  --    Bathing Goal OT LTG, Time to Achieve --  1 wk --    Bathing Goal OT LTG, Activity Type --  simulates;upper body bathing --    Bathing Goal OT LTG, [place] Level --  moderate assist (50% patient effort);verbal cues required;nonverbal cues required (demo/gesture) --    Bathing Goal OT LTG, Outcome --  --  goal ongoing   Bathing Goal OT LTG, Reason Goal Not Met --  --  --      09/05/17 1557   Bathing OT LTG   Bathing Goal OT LTG, Date Established --    Bathing Goal OT LTG, Time to Achieve --    Bathing Goal OT LTG, Activity Type --    Bathing Goal OT LTG, [place] Level --    Bathing Goal OT LTG, Outcome --    Bathing Goal OT LTG, Reason Goal Not Met unable to make needed progress       Goal: Grooming Goal LTG- OT  Outcome: Revised    08/19/17 1352 08/23/17 1119 08/27/17 0859   Grooming OT LTG   Grooming Goal OT LTG, Date Established 08/19/17 --  --    Grooming Goal OT LTG, Time to Achieve --  --  1 wk   Grooming Goal OT LTG, [place] Level --  --  supervision required;verbal cues required;nonverbal cues required (demo/gesture)   Grooming Goal OT LTG, Adaptive Equipment --   toothbrush, electric --    Grooming Goal OT LTG, Position --  --  sitting, edge of bed   Grooming Goal OT LTG, Date Goal Reviewed --  --  08/27/17   Grooming Goal OT LTG, Outcome --  --  --    Grooming Goal OT LTG, Reason Goal Not Met --  --  --      08/30/17 1540 09/05/17 1557   Grooming OT LTG   Grooming Goal OT LTG, Date Established --  --    Grooming Goal OT LTG, Time to Achieve --  --    Grooming Goal OT LTG, [place] Level --  --    Grooming Goal OT LTG, Adaptive Equipment --  --    Grooming Goal OT LTG, Position --  --    Grooming Goal OT LTG, Date Goal Reviewed --  --    Grooming Goal OT LTG, Outcome goal ongoing --    Grooming Goal OT LTG, Reason Goal Not Met --  unable to make needed progress

## 2017-09-06 NOTE — PROGRESS NOTES
[facility]       LOS: 32 days   Patient Care Team:  No Known Provider as PCP - General  No Known Provider as PCP - Family Medicine        Subjective       Patient Denies:  Awake.  Follows commands    Vital Signs  Temp:  [97.4 °F (36.3 °C)-98.1 °F (36.7 °C)] 97.4 °F (36.3 °C)  Heart Rate:  [] 75  Resp:  [18] 18  BP: (133-166)/(61-97) 146/76    Intake/Output Summary (Last 24 hours) at 09/06/17 1019  Last data filed at 09/06/17 0811   Gross per 24 hour   Intake              150 ml   Output              875 ml   Net             -725 ml     I/O this shift:  In: 150 [IV Piggyback:150]  Out: -     Physical Exam:     General Appearance:    in no acute distress       Neck:   No adenopathy, supple, trachea midline, no JVD       Lungs:    rhonchi to auscultation,respirations regular, even and                  unlabored    Heart:    Regular rhythm and normal rate, normal S1 and S2, no            murmur, no gallop, no rub, no click   Chest Wall:    No abnormalities observed   Abdomen:     Normal bowel sounds, no masses, no organomegaly              Extremities:   no edema, no cyanosis, no redness   Pulses:   Pulses palpable and equal bilaterally     Results Review:     I reviewed the patient's new clinical results.      WBC WBC   Date/Time Value Ref Range Status   09/05/2017 0504 4.68 3.50 - 10.80 10*3/mm3 Final   09/04/2017 0453 4.54 3.50 - 10.80 10*3/mm3 Final            HGB Hemoglobin   Date/Time Value Ref Range Status   09/05/2017 0504 7.4 (L) 13.1 - 17.5 g/dL Final   09/04/2017 2008 7.2 (L) 13.1 - 17.5 g/dL Final   09/04/2017 0453 8.0 (L) 13.1 - 17.5 g/dL Final   09/03/2017 2006 7.7 (L) 13.1 - 17.5 g/dL Final           HCT Hematocrit   Date/Time Value Ref Range Status   09/05/2017 0504 23.8 (L) 38.9 - 50.9 % Final   09/04/2017 2008 22.5 (L) 38.9 - 50.9 % Final   09/04/2017 0453 24.9 (L) 38.9 - 50.9 % Final   09/03/2017 2006 23.6 (L) 38.9 - 50.9 % Final             Platlets No results found for: LABPLAT  Sodium  Sodium   Date/Time Value Ref Range Status   09/05/2017 0504 147 (H) 132 - 146 mmol/L Final   09/04/2017 0453 144 132 - 146 mmol/L Final   09/04/2017 0453 146 132 - 146 mmol/L Final     Potassium  Potassium   Date/Time Value Ref Range Status   09/05/2017 0504 4.3 3.5 - 5.5 mmol/L Final   09/04/2017 0453 4.7 3.5 - 5.5 mmol/L Final   09/04/2017 0453 4.8 3.5 - 5.5 mmol/L Final     Chloride  Chloride   Date/Time Value Ref Range Status   09/05/2017 0504 113 (H) 99 - 109 mmol/L Final   09/04/2017 0453 113 (H) 99 - 109 mmol/L Final   09/04/2017 0453 115 (H) 99 - 109 mmol/L Final     BicarbonateNo results found for: PLASMABICARB    BUN BUN   Date/Time Value Ref Range Status   09/05/2017 0504 83 (H) 9 - 23 mg/dL Final   09/04/2017 0453 80 (H) 9 - 23 mg/dL Final   09/04/2017 0453 84 (H) 9 - 23 mg/dL Final      Creatinine Creatinine   Date/Time Value Ref Range Status   09/05/2017 0504 2.50 (H) 0.60 - 1.30 mg/dL Final   09/04/2017 0453 2.30 (H) 0.60 - 1.30 mg/dL Final   09/04/2017 0453 2.30 (H) 0.60 - 1.30 mg/dL Final      Calcium Calcium   Date/Time Value Ref Range Status   09/05/2017 0504 8.3 (L) 8.7 - 10.4 mg/dL Final   09/04/2017 0453 8.4 (L) 8.7 - 10.4 mg/dL Final   09/04/2017 0453 8.4 (L) 8.7 - 10.4 mg/dL Final      Mag Magnesium   Date/Time Value Ref Range Status   09/04/2017 0453 2.7 1.3 - 2.7 mg/dL Final           PT/INR:  No results found for: PROTIME/No results found for: INR  Troponin I     Lab Results   Component Value Date    TROPONINI 0.280 (H) 08/07/2017         carvedilol 25 mg Oral Q12H   castor oil-balsam peru  Topical Q12H   folic acid (FOLVITE) IVPB 1 mg Intravenous Daily   heparin (porcine) 5,000 Units Subcutaneous Q12H   hydrALAZINE 75 mg Oral Q8H   miconazole  Topical Q12H   miconazole  Topical Q12H   polyethylene glycol 17 g Oral Daily   terazosin 5 mg Oral Q12H   thiamine (VITAMIN B1) IVPB 100 mg Intravenous Daily          Assessment/Plan      Patient Active Problem List   Diagnosis Code   • Complete heart block I44.2   • Accelerated hypertension I10   • KRISTA with CKD N17.9   • Bilateral LE cellulitis L03.116   • Chronic alcohol use F10.10   • Non-compliance Z91.19   • Altered mental status (Probable metabolic encephalopathy) R41.82   • ? Cerebral vascular disease (Prior carotid stent) I67.9   • Elevated troponin (R/O Type 2 NSTEMI) R79.89   • Hyperlipidemia E78.5   • CAD (Prior stent) I25.10   • Alcohol withdrawal F10.239   • Thrombosis of right cephalic vein I80.8   • Right arm cellulitis L03.113   • Anemia of chronic disease D63.8   • Hypernatremia E87.0   • Azotemia R79.89     CV no change  Await placement    MARAH Garza  09/06/17  10:19 AM

## 2017-09-06 NOTE — PLAN OF CARE
Problem: Patient Care Overview (Adult)  Goal: Plan of Care Review  Outcome: Ongoing (interventions implemented as appropriate)    09/06/17 9727   Outcome Evaluation   Outcome Summary/Follow up Plan Pt alert & awake, but still confused with slurred speech. Pt has eaten 100% of breakfast, & lunch with great PO liquid intake.Per orders, tele D/C'd   Coping/Psychosocial Response Interventions   Plan Of Care Reviewed With patient   Patient Care Overview   Progress improving         Problem: Chronic Kidney Disease/End Stage Renal Disease (Adult)  Goal: Signs and Symptoms of Listed Potential Problems Will be Absent or Manageable (Chronic Kidney Disease/End Stage Renal Disease)  Outcome: Ongoing (interventions implemented as appropriate)    Problem: Pressure Ulcer (Adult)  Goal: Signs and Symptoms of Listed Potential Problems Will be Absent or Manageable (Pressure Ulcer)  Outcome: Ongoing (interventions implemented as appropriate)

## 2017-09-06 NOTE — PROGRESS NOTES
"Palliative Care Progress Note    Date of Admission: 8/5/2017    Code Status: AND, comfort  Subjective:  Patient is more awake today, eating and drinking  water.   Mild intermittent congestion.   Patient unable to answer direct questions or follow commands.   Patient pulling and picking at wires  + urinary retention - 750mL with in/out catheter.     Reviewed current scheduled and prn medications for route, type, dose, and frequency.     •  albuterol  •  amLODIPine  •  haloperidol lactate  •  ipratropium-albuterol  •  LORazepam  •  ondansetron  •  QUEtiapine  •  sodium chloride    Objective:  PPS 30%   /58 (BP Location: Left arm, Patient Position: Lying)  Pulse 77  Temp 96.5 °F (35.8 °C) (Axillary)   Resp 18  Ht 72\" (182.9 cm)  Wt 224 lb 13.9 oz (102 kg)  SpO2 96%  BMI 30.5 kg/m2   Intake & Output (last day)       09/05 0701 - 09/06 0700 09/06 0701 - 09/07 0700    P.O. 180 2240    IV Piggyback  150    Total Intake(mL/kg) 180 (1.8) 2390 (23.4)    Urine (mL/kg/hr) 900 (0.4) 750 (0.7)    Total Output 900 750    Net -720 +1640              Lab Results (last 24 hours)     ** No results found for the last 24 hours. **        Imaging Results (last 24 hours)     ** No results found for the last 24 hours. **          Physical Exam:  Gen: NAD, sitting upright in bed  Skin: warm, dry   Eyes: JEANNA, conjunctiva clear and moist   Resp/thorax: even effort, non labored, CTA   CV: RRR   ABD: soft, bowel sounds + nontender  Ext: no edema, no redness   Neuro: awake, interactive but unable to follow a conversation, no myoclonus       Reviewed labs and diagnostic results.   No results found for: HGBA1C    Results from last 7 days  Lab Units 09/05/17  0504   WBC 10*3/mm3 4.68   HEMOGLOBIN g/dL 7.4*   HEMATOCRIT % 23.8*   PLATELETS 10*3/mm3 119*       Results from last 7 days  Lab Units 09/05/17  0504 09/04/17  0453   SODIUM mmol/L 147* 146  144   POTASSIUM mmol/L 4.3 4.8  4.7   CHLORIDE mmol/L 113* 115*  113*   CO2 mmol/L " 22.0 20.0  22.0   BUN mg/dL 83* 84*  80*   CREATININE mg/dL 2.50* 2.30*  2.30*   CALCIUM mg/dL 8.3* 8.4*  8.4*   BILIRUBIN mg/dL  --  0.2*   ALK PHOS U/L  --  91   ALT (SGPT) U/L  --  118*   AST (SGOT) U/L  --  61*   GLUCOSE mg/dL 75 75  75       Impression: 81 y.o. male with metabolic encephalopathy, KRISTA with CKD stage 4 with worsening renal function, dysphagia on comfort feeds, anemia s/p transfusion 9/2.     Plan: transition to comfort focused plan of care. NO further transfusions, NO artificial nutrition, NO further lab draws and diagnostic work up,  plan transfer to inpatient hospice care.     Restlessness - wrists restraints removed to provide dignity and respect for patient. Patient picking some at tubing. Eliminate unnecessary tubing and monitoring equipment for safety.   Discontinue cardiac telemetry and removed oxygen tubing.     Dysphagia - continue comfort feeds, aspiration precautions    AMS - continue to monitor    Plan - transition to comfort focused plan of care. Talked with Carrie Pacheco yesterday and she is in agreement to transition to comfort focused plan of care, to honor the patient's wishes.   Healthcare surrogate is Carrie Pacheco who lives in [facility].   Hospice team has been in contact but will require faxing information to complete signing and electing the patient's hospice insurance benefit.   Time: 30 minutes       Nina Ortega, ZOILA  [phone]  09/06/17  4:57 PM

## 2017-09-06 NOTE — PROGRESS NOTES
"Adult Nutrition  Assessment/PES    Patient Name:  Franky Pacheco  YOB: 1935  MRN: [id number]  Admit Date:  8/5/2017    Assessment Date:  9/6/2017        Reason for Assessment       09/06/17 1439    Reason for Assessment    Reason For Assessment/Visit follow up protocol    Time Spent (min) 20    Diagnosis Diagnosis   Per notes this admission                Anthropometrics       09/06/17 1440    Anthropometrics    Height 182.9 cm (72\")    Weight 102 kg (224 lb 13.9 oz)    Ideal Body Weight (IBW)    Ideal Body Weight (IBW), Male (kg) 82.07    % Ideal Body Weight 124.55    Body Mass Index (BMI)    BMI (kg/m2) 30.56            Labs/Tests/Procedures/Meds       09/06/17 1440    Labs/Tests/Procedures/Meds    Labs/Tests Review Reviewed                Nutrition Prescription Ordered       09/06/17 1440    Nutrition Prescription PO    Current PO Diet Dysphagia    Dysphagia Level 2  Pureed    Fluid Consistency Thin    Supplement Noval Renal    Supplement Frequency 3 times a day    Other Modifiers --   no straw            Evaluation of Received Nutrient/Fluid Intake       09/06/17 1441    PO Evaluation    Number of Meals 5    % PO Intake 6              Problem/Interventions:        Problem 1       09/06/17 1442    Nutrition Diagnoses Problem 1    Problem 1 Nutrition Appropriate for Condition at this Time    Etiology (related to) Medical Diagnosis   clinical condition    Signs/Symptoms (evidenced by) --   comfort measures                    Intervention Goal       09/06/17 1448    Intervention Goal    General Nutrition support treatment   await hospice recommendations             Nutrition Intervention       09/06/17 1449    Nutrition Intervention    RD/Tech Action Follow Tx progress;Care plan reviewd              Education/Evaluation       09/06/17 1450    Monitor/Evaluation    Monitor Per protocol        Comments:      Electronically signed by:  Mary Jane Marie CNA  09/06/17 2:50 PM  "

## 2017-09-06 NOTE — PROGRESS NOTES
[facility] Medicine Services  INPATIENT PROGRESS NOTE    Date of Admission: 8/5/2017  Length of Stay: 32  Primary Care Physician: No Known Provider  Subjective   CC: follow up altered mental status    HPI:  Awake and alert this a.m., No family present.  Pt is oriented to person only, follows simple commands. Pt denies pain, SOA, CP, or palpitations.       Review Of Systems:   Review of Systems   Respiratory: Negative for shortness of breath.    Cardiovascular: Negative for chest pain.   Gastrointestinal: Negative for abdominal pain.     Unable to obtain secondary to AMS  Objective    Temp:  [97.4 °F (36.3 °C)-98.1 °F (36.7 °C)] 97.4 °F (36.3 °C)  Heart Rate:  [] 75  Resp:  [18] 18  BP: (133-166)/(61-97) 133/97  Physical Exam   Physical Examination:    General Appearance:     Resting, comfortable  no acute distress. Awake, alert     Head:    Atraumatic and normocephalic, without obvious abnormality.           Neck:   Supple, trachea midline   Lungs:     Chest shape is normal. Breath sounds heard bilaterally equally reduced.  Scattered rhonchi throughout, no wheezes. No Pleural rub or bronchial breathing. Respirations nonlabored    Heart:    Ventricular paced rhythm on telemetry, no murmur, no gallop, no rub. No JVD   Abdomen:     Normal bowel sounds, no masses, no organomegaly. non-distended, no guarding, no rebound                  Extremities:    Trace bilat LE edema, no cyanosis, no clubbing; wearing protective boots    Neuro: A&O x1, follows simple commands   Skin:   No bleeding, bruising or rash             Results Review:    I have reviewed the labs, radiology results and diagnostic studies.    Results from last 7 days  Lab Units 09/05/17  0504   WBC 10*3/mm3 4.68   HEMOGLOBIN g/dL 7.4*   PLATELETS 10*3/mm3 119*       Results from last 7 days  Lab Units 09/05/17  0504   SODIUM mmol/L 147*   POTASSIUM mmol/L 4.3   CHLORIDE mmol/L 113*   CO2 mmol/L 22.0   BUN mg/dL 83*    CREATININE mg/dL 2.50*   GLUCOSE mg/dL 75   CALCIUM mg/dL 8.3*     Culture Data:   Microbiology Results (last 10 days)     ** No results found for the last 240 hours. **        Radiology Data:   Imaging Results (last 24 hours)     ** No results found for the last 24 hours. **        I have reviewed the medications  Assessment/Plan   Problem List  Hospital Problem List     * (Principal)Accelerated hypertension    Complete heart block    KRISTA with CKD    Bilateral LE cellulitis    Chronic alcohol use    Non-compliance    Altered mental status (Probable metabolic encephalopathy)    ? Cerebral vascular disease (Prior carotid stent)    Elevated troponin (R/O Type 2 NSTEMI)    Hyperlipidemia    CAD (Prior stent)    Alcohol withdrawal    Thrombosis of right cephalic vein    Right arm cellulitis    Anemia of chronic disease    Hypernatremia    Azotemia        Assessment/Plan:  Patient admitted for acute onset confusion, in hypertensive emergency. He was treated for cellulitis, complete heart block and originally admitted to ICU post pacemaker placement. He has continued to have reduced activity, continued confusion and delirium and dysphagia.     1. Acute Delirium, present on admission, with persistent cognitive impairment:   He has persistent intermittent confusion with persistent functional decline.     2. Acute kidney injury on CKD :  creatinine 2.5 9/5 , Nephrology following. Further lab draws discontinued per palliative care    3. Hypernatremia resolved. Monitor with poor po intake.      4. Dysphagia:pulled his keofeed.  -Long discussion with patient's ex-wife who is the healthcare surrogate and she states that he does not want further artificial feeding or nutrition.  She wishes to continue with comfort diet     5. Lower extremity cellulitis/RUE cellulitis: Improved, s/p completion of doxy and ceftriaxone.    6. Anemia- stool guiac negative. BUN elevated concern for GIB. Family now interested in inpt hospice,  palliative discussing goals of care.       7. Elevated LFT's  --normal Ammonia, hx of etoh abuse, on thiamine and folic acid, stopped statin.  abd us normal overall, improving off of STATIN continue to monitor    8 Wheezes  -duonebs prn, nsg contacting RT for tx    Discussed daily with SS.  Palliative care discussing goals of care with pts health care surrogate,  She is requesting  Hospice and now AND, Hospice consulted,await evaluation.        DVT prophylaxis: heparin  PPX: Pepcid    Discharge Planning: await hospice recommendations   Code: AND    Casie M Mayne, PA-C   09/06/17   9:28 AM

## 2017-09-06 NOTE — PROGRESS NOTES
"   LOS: 32 days    Patient Care Team:  No Known Provider as PCP - General  No Known Provider as PCP - Family Medicine    Chief Complaint: Acute kidney injury on chronic kidney disease    Subjective   He is verbal and come indicated but not sure he is making much sense might be little bit confused    Review of Systems:   Unable to obtain d/t MS    Vital Sign Min/Max for last 24 hours  Temp  Min: 97.4 °F (36.3 °C)  Max: 98.1 °F (36.7 °C)   BP  Min: 133/97  Max: 166/68   Pulse  Min: 65  Max: 130   Resp  Min: 18  Max: 18   SpO2  Min: 96 %  Max: 96 %   Flow (L/min)  Min: 2  Max: 2   No Data Recorded     Flowsheet Rows         First Filed Value    Admission Height  72\" (182.9 cm) Documented at 08/05/2017 1559    Admission Weight  215 lb (97.5 kg) Documented at 08/05/2017 1559          I/O this shift:  In: 150 [IV Piggyback:150]  Out: -   I/O last 3 completed shifts:  In: 180 [P.O.:180]  Out: 1600 [Urine:1600]    Physical Exam:  General appearance:Patient is awake alert in place and person.  Following commands still mild confusion  HEENT: Eyes pupils are reactive and equal, neck supple, no JVD.Dry mucosa  Lungs: Clear to auscultation, no rhonchi's, Rales, equal chest movement, nonlabored.  Heart: No gallop murmur or rub.  Abdomen: Soft nontender megaly positive bowel sounds.  Extremities: Mild dependent lower extremity edema.   CNS: Patient is alert awake following commands and moving all extremities no focal deficit  Psych mild anxiety is noted.  Skin warm dry Positive redness noted right forearm.    WBC WBC   Date Value Ref Range Status   09/05/2017 4.68 3.50 - 10.80 10*3/mm3 Final   09/04/2017 4.54 3.50 - 10.80 10*3/mm3 Final      HGB Hemoglobin   Date Value Ref Range Status   09/05/2017 7.4 (L) 13.1 - 17.5 g/dL Final   09/04/2017 7.2 (L) 13.1 - 17.5 g/dL Final   09/04/2017 8.0 (L) 13.1 - 17.5 g/dL Final   09/03/2017 7.7 (L) 13.1 - 17.5 g/dL Final      HCT Hematocrit   Date Value Ref Range Status   09/05/2017 23.8 (L) " 38.9 - 50.9 % Final   09/04/2017 22.5 (L) 38.9 - 50.9 % Final   09/04/2017 24.9 (L) 38.9 - 50.9 % Final   09/03/2017 23.6 (L) 38.9 - 50.9 % Final      Platlets No results found for: LABPLAT   MCV MCV   Date Value Ref Range Status   09/05/2017 90.2 80.0 - 99.0 fL Final   09/04/2017 88.9 80.0 - 99.0 fL Final          Sodium Sodium   Date Value Ref Range Status   09/05/2017 147 (H) 132 - 146 mmol/L Final   09/04/2017 144 132 - 146 mmol/L Final   09/04/2017 146 132 - 146 mmol/L Final      Potassium Potassium   Date Value Ref Range Status   09/05/2017 4.3 3.5 - 5.5 mmol/L Final   09/04/2017 4.7 3.5 - 5.5 mmol/L Final   09/04/2017 4.8 3.5 - 5.5 mmol/L Final      Chloride Chloride   Date Value Ref Range Status   09/05/2017 113 (H) 99 - 109 mmol/L Final   09/04/2017 113 (H) 99 - 109 mmol/L Final   09/04/2017 115 (H) 99 - 109 mmol/L Final      CO2 CO2   Date Value Ref Range Status   09/05/2017 22.0 20.0 - 31.0 mmol/L Final   09/04/2017 22.0 20.0 - 31.0 mmol/L Final   09/04/2017 20.0 20.0 - 31.0 mmol/L Final      BUN BUN   Date Value Ref Range Status   09/05/2017 83 (H) 9 - 23 mg/dL Final   09/04/2017 80 (H) 9 - 23 mg/dL Final   09/04/2017 84 (H) 9 - 23 mg/dL Final      Creatinine Creatinine   Date Value Ref Range Status   09/05/2017 2.50 (H) 0.60 - 1.30 mg/dL Final   09/04/2017 2.30 (H) 0.60 - 1.30 mg/dL Final   09/04/2017 2.30 (H) 0.60 - 1.30 mg/dL Final      Calcium Calcium   Date Value Ref Range Status   09/05/2017 8.3 (L) 8.7 - 10.4 mg/dL Final   09/04/2017 8.4 (L) 8.7 - 10.4 mg/dL Final   09/04/2017 8.4 (L) 8.7 - 10.4 mg/dL Final      PO4 No results found for: CAPO4   Albumin Albumin   Date Value Ref Range Status   09/04/2017 3.10 (L) 3.20 - 4.80 g/dL Final   09/04/2017 3.10 (L) 3.20 - 4.80 g/dL Final      Magnesium Magnesium   Date Value Ref Range Status   09/04/2017 2.7 1.3 - 2.7 mg/dL Final      Uric Acid No results found for: URICACID        Results Review:     I reviewed the patient's new clinical  results.      carvedilol 25 mg Oral Q12H   castor oil-balsam peru  Topical Q12H   folic acid (FOLVITE) IVPB 1 mg Intravenous Daily   heparin (porcine) 5,000 Units Subcutaneous Q12H   hydrALAZINE 75 mg Oral Q8H   miconazole  Topical Q12H   miconazole  Topical Q12H   polyethylene glycol 17 g Oral Daily   terazosin 5 mg Oral Q12H   thiamine (VITAMIN B1) IVPB 100 mg Intravenous Daily          Medication Review: Noted above    Assessment/Plan     1- ACUTE KIDNEY INJURY IN CKD STAGE 4--Baseline Cr reportedly low-2's since 2015.  Working Dx is [facility].  Cr may be around baseline, but BUN has been very high.  Not much po intake .  CrCl slightly Further WORSE today.  Adjust meds for a CrCl 20-25ml/min.  Follow BMP and UOP daily.  I will do gentle hydration    2- ANEMIA NOS--Hgb far below target.  Check stool guaiac since BUN staying elevated, which could indicate GI bleed.  Consider transfusing 2units PRBC's.    3. Hypernatremia; Likely due to not having much po water, I will start him on a D5W 50 cc an hour for 24 hours        Kun Cohen MD  09/06/17  10:36 AM
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
## 2017-09-06 NOTE — PLAN OF CARE
Problem: Patient Care Overview (Adult)  Goal: Plan of Care Review  Outcome: Ongoing (interventions implemented as appropriate)    09/06/17 1100   Outcome Evaluation   Outcome Summary/Follow up Plan Patient awake, out of restraints, eating and drinking, answering questions. Pallitive will continue to follow   Coping/Psychosocial Response Interventions   Plan Of Care Reviewed With patient   Patient Care Overview   Progress improving

## 2017-09-06 NOTE — PLAN OF CARE
Problem: Fall Risk (Adult)  Intervention: Monitor/Assist with Self Care    09/06/17 0206   Activity   Activity Type activity adjusted per tolerance   Activity Level of Assistance assistance, 2 people   Monitor/Assist with Self Care   Ambulation 4-->completely dependent   Transferring 4-->completely dependent   Toileting 4-->completely dependent   Bathing 4-->completely dependent   Dressing 4-->completely dependent   Eating 2-->assistive person   Communication 2-->difficulty understanding (not related to language barrier)   Swallowing (if score 2 or more for any item, consult Rehab Services) 2-->difficulty swallowing liquids           Problem: Chronic Kidney Disease/End Stage Renal Disease (Adult)  Goal: Signs and Symptoms of Listed Potential Problems Will be Absent or Manageable (Chronic Kidney Disease/End Stage Renal Disease)  Outcome: Ongoing (interventions implemented as appropriate)    Problem: Pressure Ulcer (Adult)  Goal: Signs and Symptoms of Listed Potential Problems Will be Absent or Manageable (Pressure Ulcer)  Outcome: Ongoing (interventions implemented as appropriate)    Problem: Confusion, Acute (Adult)  Goal: Cognitive/Functional Impairments Minimized  Outcome: Ongoing (interventions implemented as appropriate)    Problem: Nutrition, Enteral (Adult)  Goal: Signs and Symptoms of Listed Potential Problems Will be Absent or Manageable (Nutrition, Enteral)  Outcome: Outcome(s) achieved Date Met:  09/06/17

## 2017-09-07 NOTE — PLAN OF CARE
Problem: Patient Care Overview (Adult)  Goal: Plan of Care Review  Outcome: Ongoing (interventions implemented as appropriate)    09/07/17 0422   Outcome Evaluation   Outcome Summary/Follow up Plan pt alert but confused. Slurred speech. continuing to I&O cath. pt over all rest well overnight.   Coping/Psychosocial Response Interventions   Plan Of Care Reviewed With patient   Patient Care Overview   Progress no change         Problem: Cellulitis (Adult)  Goal: Signs and Symptoms of Listed Potential Problems Will be Absent or Manageable (Cellulitis)  Outcome: Ongoing (interventions implemented as appropriate)    Problem: Chronic Kidney Disease/End Stage Renal Disease (Adult)  Goal: Signs and Symptoms of Listed Potential Problems Will be Absent or Manageable (Chronic Kidney Disease/End Stage Renal Disease)  Outcome: Ongoing (interventions implemented as appropriate)    Problem: Pressure Ulcer (Adult)  Goal: Signs and Symptoms of Listed Potential Problems Will be Absent or Manageable (Pressure Ulcer)  Outcome: Ongoing (interventions implemented as appropriate)    Problem: Confusion, Acute (Adult)  Goal: Cognitive/Functional Impairments Minimized  Outcome: Ongoing (interventions implemented as appropriate)  Goal: Safety  Outcome: Ongoing (interventions implemented as appropriate)

## 2017-09-07 NOTE — PLAN OF CARE
Problem: Patient Care Overview (Adult)  Goal: Plan of Care Review  Outcome: Ongoing (interventions implemented as appropriate)    09/07/17 1511   Outcome Evaluation   Outcome Summary/Follow up Plan Alert, still not oriented. Eating & drinking, but still not able to urinate. In & out cath. Hospice consulted   Coping/Psychosocial Response Interventions   Plan Of Care Reviewed With patient   Patient Care Overview   Progress no change         Problem: Pressure Ulcer (Adult)  Goal: Signs and Symptoms of Listed Potential Problems Will be Absent or Manageable (Pressure Ulcer)  Outcome: Ongoing (interventions implemented as appropriate)

## 2017-09-07 NOTE — PLAN OF CARE
Problem: Patient Care Overview (Adult)  Goal: Plan of Care Review  Outcome: Ongoing (interventions implemented as appropriate)    09/07/17 1100   Outcome Evaluation   Outcome Summary/Follow up Plan Patient awake and talking, he is eating and drinking, he is confused, unable to void, I&O cath for urinary retention, Hospice consulted.   Coping/Psychosocial Response Interventions   Plan Of Care Reviewed With patient   Patient Care Overview   Progress no change

## 2017-09-07 NOTE — PROGRESS NOTES
[facility]       LOS: 33 days   Patient Care Team:  No Known Provider as PCP - General  No Known Provider as PCP - Family Medicine        Subjective       Patient Denies:  Cp, sob, palps    Vital Signs  Temp:  [96.5 °F (35.8 °C)-97.8 °F (36.6 °C)] 97.8 °F (36.6 °C)  Heart Rate:  [61-77] 65  Resp:  [18-20] 20  BP: (106-147)/(58-72) 130/72    Intake/Output Summary (Last 24 hours) at 09/07/17 0858  Last data filed at 09/07/17 0130   Gross per 24 hour   Intake             2360 ml   Output             1200 ml   Net             1160 ml          Physical Exam:     General Appearance:    in no acute distress       Neck:   No adenopathy, supple, trachea midline, no JVD       Lungs:    clear anteriorly to auscultation,respirations regular, even and                  unlabored    Heart:    Regular rhythm and normal rate, normal S1 and S2, no            murmur, no gallop, no rub, no click   Chest Wall:    No abnormalities observed   Abdomen:     Normal bowel sounds, no masses, no organomegaly              Extremities:   no edema, no cyanosis, no redness   Pulses:   Pulses palpable and equal bilaterally     Results Review:     I reviewed the patient's new clinical results.      WBC WBC   Date/Time Value Ref Range Status   09/05/2017 0504 4.68 3.50 - 10.80 10*3/mm3 Final            HGB Hemoglobin   Date/Time Value Ref Range Status   09/05/2017 0504 7.4 (L) 13.1 - 17.5 g/dL Final   09/04/2017 2008 7.2 (L) 13.1 - 17.5 g/dL Final           HCT Hematocrit   Date/Time Value Ref Range Status   09/05/2017 0504 23.8 (L) 38.9 - 50.9 % Final   09/04/2017 2008 22.5 (L) 38.9 - 50.9 % Final            Platlets No results found for: LABPLAT  Sodium  Sodium   Date/Time Value Ref Range Status   09/05/2017 0504 147 (H) 132 - 146 mmol/L Final     Potassium  Potassium   Date/Time Value Ref Range Status   09/05/2017 0504 4.3 3.5 - 5.5 mmol/L Final     Chloride  Chloride   Date/Time Value  Ref Range Status   09/05/2017 0504 113 (H) 99 - 109 mmol/L Final     BicarbonateNo results found for: PLASMABICARB    BUN BUN   Date/Time Value Ref Range Status   09/05/2017 0504 83 (H) 9 - 23 mg/dL Final      Creatinine Creatinine   Date/Time Value Ref Range Status   09/05/2017 0504 2.50 (H) 0.60 - 1.30 mg/dL Final      Calcium Calcium   Date/Time Value Ref Range Status   09/05/2017 0504 8.3 (L) 8.7 - 10.4 mg/dL Final      Mag No results found for: MG        PT/INR:  No results found for: PROTIME/No results found for: INR  Troponin I     Lab Results   Component Value Date    TROPONINI 0.280 (H) 08/07/2017         carvedilol 25 mg Oral Q12H   castor oil-balsam peru  Topical Q12H   folic acid (FOLVITE) IVPB 1 mg Intravenous Daily   hydrALAZINE 75 mg Oral Q8H   miconazole  Topical Q12H   miconazole  Topical Q12H   polyethylene glycol 17 g Oral Daily   terazosin 5 mg Oral Q12H   thiamine (VITAMIN B1) IVPB 100 mg Intravenous Daily          Assessment/Plan     Patient Active Problem List   Diagnosis Code   • Complete heart block I44.2   • Accelerated hypertension I10   • KRISTA with CKD N17.9   • Bilateral LE cellulitis L03.116   • Chronic alcohol use F10.10   • Non-compliance Z91.19   • Altered mental status (Probable metabolic encephalopathy) R41.82   • ? Cerebral vascular disease (Prior carotid stent) I67.9   • Elevated troponin (R/O Type 2 NSTEMI) R79.89   • Hyperlipidemia E78.5   • CAD (Prior stent) I25.10   • Alcohol withdrawal F10.239   • Thrombosis of right cephalic vein I80.8   • Right arm cellulitis L03.113   • Anemia of chronic disease D63.8   • Hypernatremia E87.0   • Azotemia R79.89     CV no change  Await placement    MARAH Garza  09/07/17  8:58 AM

## 2017-09-07 NOTE — SIGNIFICANT NOTE
12:30   Palliative Team Member Meeting  Attendance:    Dr. Lucas Spencer, DO Mitra Mabry, NO, ACHPN  Breanna Hirsch, RN, CHPN  Alejandra Villegas RN, PN  She Johnson, [facility] [place]  Chaplain Juan Diego

## 2017-09-07 NOTE — PROGRESS NOTES
[facility] Medicine Services  INPATIENT PROGRESS NOTE    Date of Admission: 8/5/2017  Length of Stay: 33  Primary Care Physician: No Known Provider  Subjective   CC: follow up altered mental status    HPI:  Awake,resting in bed, pleasantly confused, out of restraints, No family present.  Pt is oriented to person only, more confused today. TATIANA ROS 2nd to confusion    Review Of Systems:   Review of Systems  Unable to obtain secondary to AMS  Objective    Temp:  [96.5 °F (35.8 °C)-97.8 °F (36.6 °C)] 97.8 °F (36.6 °C)  Heart Rate:  [61-77] 65  Resp:  [18-20] 20  BP: (106-147)/(58-72) 130/72  Physical Exam   Physical Examination:    General Appearance:     Resting, comfortable, appears in no acute distress. Awake, alert     Head:    Atraumatic and normocephalic, without obvious abnormality.           Neck:   Supple, trachea midline   Lungs:     Chest shape is normal. Breath sounds heard bilaterally equally reduced.  Scattered rhonchi throughout- slightly improved from prior exam, no wheezes. No Pleural rub or bronchial breathing. Respirations nonlabored    Heart:    Ventricular paced rhythm on telemetry, no murmur, no gallop, no rub. No JVD   Abdomen:     Normal bowel sounds, no masses, no organomegaly. non-distended, no guarding, no rebound                  Extremities:    Trace bilat LE edema, no cyanosis, no clubbing; wearing protective boots    Neuro: A&O x1, pleasantly confused, does not follow commands   Skin:   No bleeding, bruising or rash             Results Review:    I have reviewed the labs, radiology results and diagnostic studies.    Results from last 7 days  Lab Units 09/05/17  0504   WBC 10*3/mm3 4.68   HEMOGLOBIN g/dL 7.4*   PLATELETS 10*3/mm3 119*       Results from last 7 days  Lab Units 09/05/17  0504   SODIUM mmol/L 147*   POTASSIUM mmol/L 4.3   CHLORIDE mmol/L 113*   CO2 mmol/L 22.0   BUN mg/dL 83*   CREATININE mg/dL 2.50*   GLUCOSE mg/dL 75   CALCIUM mg/dL 8.3*      Culture Data:   Microbiology Results (last 10 days)     ** No results found for the last 240 hours. **        Radiology Data:   Imaging Results (last 24 hours)     ** No results found for the last 24 hours. **        I have reviewed the medications  Assessment/Plan   Problem List  Hospital Problem List     * (Principal)Accelerated hypertension    Complete heart block    KRISTA with CKD    Bilateral LE cellulitis    Chronic alcohol use    Non-compliance    Altered mental status (Probable metabolic encephalopathy)    ? Cerebral vascular disease (Prior carotid stent)    Elevated troponin (R/O Type 2 NSTEMI)    Hyperlipidemia    CAD (Prior stent)    Alcohol withdrawal    Thrombosis of right cephalic vein    Right arm cellulitis    Anemia of chronic disease    Hypernatremia    Azotemia        Assessment/Plan:  Patient admitted for acute onset confusion, in hypertensive emergency. He was treated for cellulitis, complete heart block and originally admitted to ICU post pacemaker placement. He has continued to have reduced activity, continued confusion and delirium and dysphagia.     1. Acute Delirium, present on admission, with persistent cognitive impairment:   He has persistent intermittent confusion with persistent functional decline.     2. Acute kidney injury on CKD :  creatinine 2.5 9/5 , Nephrology following. Further lab draws discontinued per palliative care    3. Hypernatremia resolved. Monitor with poor po intake.      4. Dysphagia:pulled his keofeed.  -Long discussion with patient's ex-wife who is the healthcare surrogate and she states that he does not want further artificial feeding or nutrition.  She wishes to continue with comfort diet     5. Lower extremity cellulitis/RUE cellulitis: Improved, s/p completion of doxy and ceftriaxone.    6. Anemia- stool guiac negative. BUN elevated concern for GIB. Family now interested in inpt hospice, palliative discussing goals of care.       7. Elevated LFT's  --normal  Ammonia, hx of etoh abuse, on thiamine and folic acid, stopped statin.  abd us normal overall, improving off of STATIN continue to monitor    8 Wheezes  -duonebs prn    Discussed daily with SS.  Palliative care discussing goals of care with pts health care surrogate,  She is requesting  Hospice and now AND, Hospice consulted and are attempting to contact HCS .     Continue comfort care for now     DVT prophylaxis: heparin  PPX: Pepcid    Discharge Planning: await hospice recommendations   Code: AND Casie M Mayne, PA-C   09/07/17   3:36 PM

## 2017-09-07 NOTE — PROGRESS NOTES
Continued Stay Note  [facility]     Patient Name: Franky Pacheco  MRN: [id number]  Today's Date: 9/7/2017    Admit Date: 8/5/2017          Discharge Plan       09/07/17 1320    Case Management/Social Work Plan    Plan Possible inpatient hospice    Additional Comments SW made second phone call to patient's HCS, Carrie Pacheco, to discuss inpatient hospice. No answer. SW left a voicemail requesting return phone call.     In order to become a hospice patient, legal decision maker will need to sign admission paperwork. [facility] lives in [place]. If [facility] has access to a fax machine, SW will fax paperwork for her to sign if she is agreeable to hospice.     If  can be of assistance, please call [phone].                    Discharge Codes     None        Expected Discharge Date and Time     Expected Discharge Date Expected Discharge Time    Sep 8, 2017             CHEL Childs

## 2017-09-08 PROBLEM — I51.9 HEART DISEASE: Status: ACTIVE | Noted: 2017-01-01

## 2017-09-08 NOTE — PROGRESS NOTES
[facility]       LOS: 34 days   Patient Care Team:  No Known Provider as PCP - General  No Known Provider as PCP - Family Medicine        Subjective       Patient Denies:  Asleep    Vital Signs  Temp:  [96.7 °F (35.9 °C)-97.9 °F (36.6 °C)] 97.9 °F (36.6 °C)  Heart Rate:  [60-65] 60  Resp:  [18-20] 18  BP: (117-146)/(63-81) 118/66    Intake/Output Summary (Last 24 hours) at 09/08/17 0807  Last data filed at 09/08/17 0200   Gross per 24 hour   Intake              360 ml   Output             1125 ml   Net             -765 ml          Physical Exam:     General Appearance:    in no acute distress       Neck:   No adenopathy, supple, trachea midline, no JVD       Lungs:    clear anteriorly to auscultation,respirations regular, even and                  unlabored    Heart:    Regular rhythm and normal rate, normal S1 and S2, no            murmur, no gallop, no rub, no click   Chest Wall:    No abnormalities observed   Abdomen:     Normal bowel sounds, no masses, no organomegaly              Extremities:   no edema, no cyanosis, no redness   Pulses:   Pulses palpable and equal bilaterally     Results Review:     I reviewed the patient's new clinical results.      WBC No results found for: WBC         HGB No results found for: HGB        HCT No results found for: HCT         Platlets No results found for: LABPLAT  Sodium  No results found for: NA  Potassium  No results found for: K  Chloride  No results found for: CL  BicarbonateNo results found for: PLASMABICARB    BUN No results found for: BUN   Creatinine No results found for: CREATININE   Calcium No results found for: CALCIUM   Mag No results found for: MG        PT/INR:  No results found for: PROTIME/No results found for: INR  Troponin I     Lab Results   Component Value Date    TROPONINI 0.280 (H) 08/07/2017         carvedilol 25 mg Oral Q12H   castor oil-balsam peru  Topical Q12H   folic acid (FOLVITE)  IVPB 1 mg Intravenous Daily   hydrALAZINE 75 mg Oral Q8H   miconazole  Topical Q12H   miconazole  Topical Q12H   polyethylene glycol 17 g Oral Daily   terazosin 5 mg Oral Q12H   thiamine (VITAMIN B1) IVPB 100 mg Intravenous Daily          Assessment/Plan     Patient Active Problem List   Diagnosis Code   • Complete heart block I44.2   • Accelerated hypertension I10   • KRISTA with CKD N17.9   • Bilateral LE cellulitis L03.116   • Chronic alcohol use F10.10   • Non-compliance Z91.19   • Altered mental status (Probable metabolic encephalopathy) R41.82   • ? Cerebral vascular disease (Prior carotid stent) I67.9   • Elevated troponin (R/O Type 2 NSTEMI) R79.89   • Hyperlipidemia E78.5   • CAD (Prior stent) I25.10   • Alcohol withdrawal F10.239   • Thrombosis of right cephalic vein I80.8   • Right arm cellulitis L03.113   • Anemia of chronic disease D63.8   • Hypernatremia E87.0   • Azotemia R79.89     CV no change  Await placement    MARAH Garza  09/08/17  8:07 AM

## 2017-09-08 NOTE — DISCHARGE SUMMARY
[facility] Medicine Services  DISCHARGE SUMMARY       Date of Admission: 8/5/2017  Date of Discharge:  9/8/2017  Primary Care Physician: No Known Provider  Consulting Physician(s)     Provider Relationship    Perfecto Grace MD Consulting Physician    Gallo Millard MD Consulting Physician    Lucas Spencer DO Consulting Physician    Jim Mcneil MD Consulting Physician          Discharge Diagnoses:  Active Hospital Problems (** Indicates Principal Problem)    Diagnosis Date Noted   • **Accelerated hypertension [I10] 08/05/2017   • Azotemia [R79.89] 08/23/2017   • Hypernatremia [E87.0] 08/22/2017   • Anemia of chronic disease [D63.8] 08/20/2017   • Thrombosis of right cephalic vein [I80.8] 08/17/2017   • Right arm cellulitis [L03.113] 08/17/2017   • Alcohol withdrawal [F10.239] 08/06/2017   • Complete heart block [I44.2] 08/05/2017   • KRISTA with CKD [N17.9] 08/05/2017   • Bilateral LE cellulitis [L03.116] 08/05/2017   • Chronic alcohol use [F10.10] 08/05/2017   • Non-compliance [Z91.19] 08/05/2017   • Altered mental status (Probable metabolic encephalopathy) [R41.82] 08/05/2017   • ? Cerebral vascular disease (Prior carotid stent) [I67.9] 08/05/2017   • Elevated troponin (R/O Type 2 NSTEMI) [R79.89] 08/05/2017   • Hyperlipidemia [E78.5] 08/05/2017   • CAD (Prior stent) [I25.10] 08/05/2017      Resolved Hospital Problems    Diagnosis Date Noted Date Resolved   No resolved problems to display.       Presenting Problem/History of Present Illness  Complete heart block [I44.2]     Chief Complaint on Day of Discharge: FU [facility]    History of Present Illness on Day of Discharge:   Patient resting quietly in bed with no family at bedside.  Contacted by hospice nurse.  Patient meets inpatient hospice criteria.      Hospital Course  Patient is a 81 y.o. male who was brought to the ED due to worsening confusion.  He was noted to have marked hypertension with bradycardia and complete  heart block.  He also had an acute on chronic kidney disease.  He was admitted to the ICU and was seen by cardiology.  He was taken for a PPM placement.  He continued to decline and palliative medicine was consulted.  He eventually met hospice criteria and will be switched to inpatient hospice here at [facility].      Procedures Performed  Procedure(s):  Pacemaker       Consults:   Consults     Date and Time Order Name Status Description    9/3/2017 0916 Inpatient Consult to Palliative Care MD Completed     8/9/2017 0912 Inpatient Consult to Nephrology Completed     8/5/2017 2212 Inpatient Consult to Cardiology      8/5/2017 2209 Inpatient Consult to Podiatry      8/5/2017 2000 Inpatient Consult to Cardiology            Pertinent Test Results:  Lab Results   Component Value Date    WBC 4.68 09/05/2017    HGB 7.4 (L) 09/05/2017    HCT 23.8 (L) 09/05/2017    MCV 90.2 09/05/2017     (L) 09/05/2017     Lab Results   Component Value Date    GLUCOSE 75 09/05/2017    CALCIUM 8.3 (L) 09/05/2017     (H) 09/05/2017    K 4.3 09/05/2017    CO2 22.0 09/05/2017     (H) 09/05/2017    BUN 83 (H) 09/05/2017    CREATININE 2.50 (H) 09/05/2017    EGFRIFNONA 25 (L) 09/05/2017    BCR 33.2 (H) 09/05/2017    ANIONGAP 12.0 (H) 09/05/2017     Imaging Results (last 7 days)     Procedure Component Value Units Date/Time    US Abdomen Limited [[id number]] Collected:  09/03/17 1118     Updated:  09/03/17 1155    Narrative:       EXAMINATION: US ABDOMEN LIMITED - 09/03/2017     INDICATION:  R13.13-Dysphagia, pharyngeal phase; I44.2-Atrioventricular  block, complete; I10-Essential (primary) hypertension; Z74.09-Other  reduced mobility.     COMPARISON: None.     FINDINGS:   1. Images of the pancreas are limited secondary to overlying bowel gas,  however, indirect signs of pancreatic mass or pseudocyst are not  identified.     2. Portions of the liver that are identified from gaseous interference  are normal. The echo architecture the  "liver is unremarkable. Focal liver  mass is not appreciated. There is no free fluid around the liver.     3. The gallbladder examination is negative. Mobile shadowing stones are  not currently identified. The gallbladder wall thickness is normal. The  common bile duct is 4.8 mm.     4. Right kidney measures 10.3 x 5.8 x 6.4 cm. There is 1.7 cm of  cortical thickness. There is a 1 cm cyst. There is no solid mass or  hydronephrosis.       Impression:          Gallstones are not identified. Common bile duct is normal.     Portions of the liver that are identified are within normal limits.  Liver mass or abnormal echo architecture is not identified.     Limited views of the pancreas are grossly negative.     No acute focal abnormalities are identified on this somewhat limited  exam.      DICTATED:     09/03/2017  EDITED:         09/03/2017     This report was finalized on 9/3/2017 11:52 AM by Dr. Houstno Soriano MD.               Condition on Discharge:  Poor    Physical Exam on Discharge:/81 (BP Location: Left arm, Patient Position: Lying)  Pulse 60  Temp 97.7 °F (36.5 °C) (Axillary)   Resp 18  Ht 72\" (182.9 cm)  Wt 224 lb 13.9 oz (102 kg)  SpO2 96%  BMI 30.5 kg/m2  Physical Exam    General: Resting in bed with eyes closed, no family at bedside.    HEENT: Normocephalic, mucous membranes moist, pupils equal  Neck: Supple, trachea midline  Cardiovascular: Regular rate and rhythm  Respiratory: even unlabored breathing  Abdomen: Soft, nontender, nondistended, bowel sounds +  Skin: Clean, dry, intact, no lesions or sores  Neuro: Asleep    Discharge Disposition      Discharge Medications   Franky Pacheco   Home Medication Instructions MARCELLUS:[id number]    Printed on:09/08/17 1204   Medication Information                      amLODIPine (NORVASC) 5 MG tablet  Take 5 mg by mouth Daily.             aspirin 81 MG EC tablet  Take 81 mg by mouth Daily.             atorvastatin (LIPITOR) 10 MG tablet  Take 10 mg by mouth " Daily.             cholecalciferol (VITAMIN D3) 1000 UNITS tablet  Take 2,000 Units by mouth Daily.             furosemide (LASIX) 40 MG tablet  Take 40 mg by mouth 2 (Two) Times a Day.             hydrALAZINE (APRESOLINE) 50 MG tablet  Take 50 mg by mouth 2 (Two) Times a Day.             Multiple Vitamins-Minerals (ONE-A-DAY 50 PLUS PO)  Take 1 tablet by mouth Daily.             Selenium 200 MCG capsule  Take 1 tablet by mouth Daily.             valsartan-hydrochlorothiazide (DIOVAN-HCT) 160-25 MG per tablet  Take 1 tablet by mouth Daily.                 Discharge Diet:     Discharge Care Plan / Instructions:    Activity at Discharge:     Follow-up Appointments  No future appointments.      Test Results Pending at Discharge       ZOILA Lozano 09/08/17 12:09 PM    Time: Discharge 30 min    Please note that portions of this note may have been completed with a voice recognition program. Efforts were made to edit the dictations, but occasionally words are mistranscribed.

## 2017-09-08 NOTE — PROGRESS NOTES
Continued Stay Note  [facility]     Patient Name: Franky Pacheco  MRN: [id number]  Today's Date: 9/8/2017    Admit Date: 8/5/2017          Discharge Plan       09/08/17 0839    Case Management/Social Work Plan    Plan Hospice    Additional Comments Case Management continuing to follow along with Palliative Care Team and Hospice.  Discussed with CHEL Garcia, LCSW yesterday.  Tanna Lind, Ext. [phone]              Discharge Codes     None        Expected Discharge Date and Time     Expected Discharge Date Expected Discharge Time    Sep 8, 2017             CHEL Fang

## 2017-10-01 NOTE — PLAN OF CARE
Problem: Patient Care Overview (Adult)  Goal: Plan of Care Review  Outcome: Ongoing (interventions implemented as appropriate)    10/01/17 0539   Coping/Psychosocial Response Interventions   Plan Of Care Reviewed With patient   Patient Care Overview   Progress no change   Outcome Evaluation   Outcome Summary/Follow up Plan low uop, bladder scan shows 337ml, removed chandler, large clot came out, tried to reinsert catheter, was unable to reinsert, patient finally voided large amount.       Goal: Adult Individualization and Mutuality  Outcome: Ongoing (interventions implemented as appropriate)  Goal: Discharge Needs Assessment  Outcome: Ongoing (interventions implemented as appropriate)    Problem: Confusion, Acute (Adult)  Goal: Cognitive/Functional Impairments Minimized  Outcome: Ongoing (interventions implemented as appropriate)  Goal: Safety  Outcome: Ongoing (interventions implemented as appropriate)    Problem: Fall Risk (Adult)  Goal: Absence of Falls  Outcome: Ongoing (interventions implemented as appropriate)    Problem: Skin Integrity Impairment, Risk/Actual (Adult)  Goal: Skin Integrity/Wound Healing  Outcome: Ongoing (interventions implemented as appropriate)    Problem: Dysphagia (Adult)  Goal: Functional/Safe Swallow  Outcome: Ongoing (interventions implemented as appropriate)  Goal: Compensatory Techniques to Improve Safety/Function with Swallowing  Outcome: Ongoing (interventions implemented as appropriate)    Problem: Sensory/Perceptual Alteration (Adult)  Goal: Functional Ability/Safety  Outcome: Ongoing (interventions implemented as appropriate)  Goal: Balanced Response to Sensory Input  Outcome: Ongoing (interventions implemented as appropriate)    Problem: Urine Elimination, Impaired (Adult)  Goal: Effective Urinary Elimination  Outcome: Ongoing (interventions implemented as appropriate)  Goal: Effective Containment of Urine  Outcome: Ongoing (interventions implemented as appropriate)  Goal: Reduced  Incontinence Episodes  Outcome: Ongoing (interventions implemented as appropriate)    Problem: Older Inpatient, Acutely Ill (Adult)  Goal: Signs and Symptoms of Listed Potential Problems Will be Absent or Manageable (Older Inpatient, Acutely Ill)  Outcome: Ongoing (interventions implemented as appropriate)

## 2017-10-01 NOTE — PLAN OF CARE
Problem: Skin Integrity Impairment, Risk/Actual (Adult)  Goal: Skin Integrity/Wound Healing  Outcome: Ongoing (interventions implemented as appropriate)    10/01/17 3882   Skin Integrity Impairment, Risk/Actual (Adult)   Skin Integrity/Wound Healing making progress toward outcome

## 2017-10-01 NOTE — PLAN OF CARE
Problem: Skin Integrity Impairment, Risk/Actual (Adult)  Goal: Skin Integrity/Wound Healing  Outcome: Ongoing (interventions implemented as appropriate)    10/01/17 9660   Skin Integrity Impairment, Risk/Actual (Adult)   Skin Integrity/Wound Healing making progress toward outcome

## 2017-10-01 NOTE — PLAN OF CARE
Problem: Fall Risk (Adult)  Goal: Absence of Falls  Outcome: Ongoing (interventions implemented as appropriate)    10/01/17 3845   Fall Risk (Adult)   Absence of Falls making progress toward outcome

## 2017-10-01 NOTE — PLAN OF CARE
Problem: Confusion, Acute (Adult)  Goal: Cognitive/Functional Impairments Minimized  Outcome: Ongoing (interventions implemented as appropriate)    10/01/17 1757   Confusion, Acute (Adult)   Cognitive/Functional Impairments Minimized making progress toward outcome       Goal: Safety  Outcome: Ongoing (interventions implemented as appropriate)    10/01/17 1757   Confusion, Acute (Adult)   Safety making progress toward outcome

## 2017-10-01 NOTE — PLAN OF CARE
Problem: Urine Elimination, Impaired (Adult)  Goal: Effective Urinary Elimination  Outcome: Ongoing (interventions implemented as appropriate)    10/01/17 1759   Urine Elimination, Impaired (Adult)   Effective Urinary Elimination making progress toward outcome       Goal: Effective Containment of Urine  Outcome: Ongoing (interventions implemented as appropriate)    10/01/17 1759   Urine Elimination, Impaired (Adult)   Effective Containment of Urine making progress toward outcome

## 2017-10-01 NOTE — PLAN OF CARE
Problem: Dysphagia (Adult)  Goal: Functional/Safe Swallow  Outcome: Ongoing (interventions implemented as appropriate)    10/01/17 1754   Dysphagia (Adult)   Functional/Safe Swallow making progress toward outcome       Goal: Compensatory Techniques to Improve Safety/Function with Swallowing  Outcome: Ongoing (interventions implemented as appropriate)

## 2017-10-01 NOTE — PLAN OF CARE
Problem: Patient Care Overview (Adult)  Goal: Plan of Care Review  Outcome: Ongoing (interventions implemented as appropriate)    10/01/17 1755   Coping/Psychosocial Response Interventions   Plan Of Care Reviewed With patient   Patient Care Overview   Progress no change       Goal: Adult Individualization and Mutuality  Outcome: Ongoing (interventions implemented as appropriate)    10/01/17 1755   Individualization   Patient Specific Preferences none   Patient Specific Goals free from injury, stable vital signs   Patient Specific Interventions maintain bed alarms, assess vitals/pain       Goal: Discharge Needs Assessment  Outcome: Ongoing (interventions implemented as appropriate)    10/01/17 1755   Discharge Needs Assessment   Concerns To Be Addressed denies needs/concerns at this time         Problem: Confusion, Acute (Adult)  Goal: Cognitive/Functional Impairments Minimized  Outcome: Ongoing (interventions implemented as appropriate)    10/01/17 1755   Confusion, Acute (Adult)   Cognitive/Functional Impairments Minimized making progress toward outcome

## 2017-10-01 NOTE — PROGRESS NOTES
Continued Stay Note  [facility]     Patient Name: Franky Pacheco  MRN: [id number]  Today's Date: 10/1/2017    Admit Date: 9/8/2017          Discharge Plan       10/01/17 1444    Case Management/Social Work Plan    Plan Inpatient Hospice Note    Additional Comments Chart Reviewed; patient is resting in bed at the time of visit. [facility] nursing staff were on the way to clean the patient up, he has had a BM. In the night patient was retaining urine despite having a chandler cath. Bladder scan revealed 300+ urine still in the bladder, [facility] LPN removed chandler cath and some blood clots proceeded the the removal of the chandler, patient did have large incontinent urine episode with more smaller blood clots. Chandler was attempted to be replaced but was unsuccessful. Patient continues to urinate on his own. Discussed this with Dr. Spencer and he advised to monitor the patient at this time. Friend Michael and Rose were in the [place] at the time of visit. No needs were expressed by them. [facility] staff nurse did not express needs either. The current disposition is for the patient to be transferred to [facility] on Tuesday with hospice services.    Should additional needs/concerns/questions rise please call the hospice team ext [phone]              Discharge Codes     None            Roxie Duffy RN

## 2017-10-01 NOTE — PLAN OF CARE
Problem: Older Inpatient, Acutely Ill (Adult)  Goal: Signs and Symptoms of Listed Potential Problems Will be Absent or Manageable (Older Inpatient, Acutely Ill)  Outcome: Ongoing (interventions implemented as appropriate)    10/01/17 1201   Older Adult Inpatient, Acutely Ill   Problems Assessed (Acutely Ill Older Adult) all   Problems Present (Acutely Ill Older Adult) acute cognitive dysfunction;cognitive impairment;functional decline/self-care deficit;situational response;voiding dysfunction

## 2017-10-01 NOTE — PROGRESS NOTES
"Hospice Progress Note    Subjective:    Nursing staff feels the patient has been fairly comfortable.  Patient with no complaints.    Objective: /75  Pulse 70  Temp 97.5 °F (36.4 °C) (Axillary)   Resp 16  Ht 72\" (182.9 cm)  Wt 224 lb (102 kg)  SpO2 97%  BMI 30.38 kg/m2     Intake/Output Summary (Last 24 hours) at 10/01/17 1330  Last data filed at 09/30/17 2205   Gross per 24 hour   Intake              120 ml   Output              550 ml   Net             -430 ml     Physical Exam:  Gen: NAD, elderly male sitting up in bed, conversant, in good spirits  Skin: pale, warm, dry   Eyes: EOMI, no scleral icterus  HENT: NC/AT, no nasal d/c, no ear deformity, moist oral mucosa  Resp/thorax: even effort, non labored, diminished bases  CV: RRR   ABD: soft, hypoactive bowel sounds, non distended, nontender  : chandler catheter in place with yellow urine  Ext: no edema or cyanosis  Neuro: Awake, alert, answers questions though speech can be a little garbled, no myoclonus  Psych: calm, smiles, pleasant today    Hospital Problem List       * (Principal)Accelerated hypertension      Complete heart block      KRISTA with CKD      Bilateral LE cellulitis      Chronic alcohol use      Non-compliance      Altered mental status (Probable metabolic encephalopathy)      ? Cerebral vascular disease (Prior carotid stent)      Elevated troponin (R/O Type 2 NSTEMI)      Hyperlipidemia      CAD (Prior stent)      Alcohol withdrawal      Thrombosis of right cephalic vein      Right arm cellulitis      Anemia of chronic disease      Hypernatremia      Azotemia                Impression:   81 y.o. male patient admitted for acute onset of confusion, in hypertensive emergency. He was treated for cellulitis, complete heart block and originally admitted to the ICU post pacemaker placement. He has continued to have reduced activity, altered mental status, dysphagia, and diminished po intake.     *Worsened hypernatremia and KRISTA on 9/21 labs so " lasix discontinued.      Symptoms:  Restlessness  Altered mental status; better when friend Everardo is present  Somnolence; worsened by Ativan but tolerates Haldol and Seroquel      Plan:   *Continue current meds, including Seroquel 12.5mg every am (got first dose today) and 25mg QHS.  *Has PRN Haldol.        Continue Hospice RHC.  To transfer to [facility] with hospice on Tuesday.    Lucas Spencer DO  10/01/17  1:30 PM

## 2017-10-02 NOTE — PLAN OF CARE
Problem: Patient Care Overview (Adult)  Goal: Plan of Care Review  Outcome: Ongoing (interventions implemented as appropriate)    10/02/17 1609   Outcome Evaluation   Outcome Summary/Follow up Plan Pt was combative this AM but is now resting with no s/s of distress. F/C draining. Will continue to monitor.        Goal: Adult Individualization and Mutuality  Outcome: Ongoing (interventions implemented as appropriate)  Goal: Discharge Needs Assessment  Outcome: Ongoing (interventions implemented as appropriate)    Problem: Confusion, Acute (Adult)  Goal: Cognitive/Functional Impairments Minimized  Outcome: Ongoing (interventions implemented as appropriate)  Goal: Safety  Outcome: Ongoing (interventions implemented as appropriate)    Problem: Fall Risk (Adult)  Goal: Absence of Falls  Outcome: Ongoing (interventions implemented as appropriate)    Problem: Skin Integrity Impairment, Risk/Actual (Adult)  Goal: Skin Integrity/Wound Healing  Outcome: Ongoing (interventions implemented as appropriate)    Problem: Dysphagia (Adult)  Goal: Functional/Safe Swallow  Outcome: Ongoing (interventions implemented as appropriate)  Goal: Compensatory Techniques to Improve Safety/Function with Swallowing  Outcome: Ongoing (interventions implemented as appropriate)    Problem: Sensory/Perceptual Alteration (Adult)  Goal: Functional Ability/Safety  Outcome: Ongoing (interventions implemented as appropriate)  Goal: Balanced Response to Sensory Input  Outcome: Ongoing (interventions implemented as appropriate)    Problem: Urine Elimination, Impaired (Adult)  Goal: Effective Urinary Elimination  Outcome: Ongoing (interventions implemented as appropriate)  Goal: Effective Containment of Urine  Outcome: Ongoing (interventions implemented as appropriate)  Goal: Reduced Incontinence Episodes  Outcome: Ongoing (interventions implemented as appropriate)    Problem: Older Inpatient, Acutely Ill (Adult)  Goal: Signs and Symptoms of Listed Potential  Problems Will be Absent or Manageable (Older Inpatient, Acutely Ill)  Outcome: Ongoing (interventions implemented as appropriate)

## 2017-10-02 NOTE — NURSING NOTE
Spoke with RN-patient now with Hospice. RN described bottom as macerated with areas of slough and eschar. Skin care orders entered. RN requested not to disturb patient as he has been agitated this morning. As patient is now Comfort Measures/AND,WOCN will sign off. Please notify for any supportive needs. Thank you.

## 2017-10-02 NOTE — PROGRESS NOTES
Continued Stay Note  [facility]     Patient Name: Franky Pacheco  MRN: [id number]  Today's Date: 10/2/2017    Admit Date: 9/8/2017          Discharge Plan       10/02/17 1329    Case Management/Social Work Plan    Plan [facility] with hospice    Additional Comments Chart reviewed.  Pt lying awake in bed.  Does not want to engage in conversation.  Calm.  Student nurse at bedside helping with lunch.  Spoke with Tanna Lind,  and she states pt has a 1400 ambulance scheduled for tomorrow.  Pt will transfer to [facility] and have hospice continue his care there.  If hospice team can be of further assist, call ext [phone].               Discharge Codes     None            Cuca Bautista RN

## 2017-10-02 NOTE — PROGRESS NOTES
"Hospice Progress Note    BP 95/52  Pulse 67  Temp 98.6 °F (37 °C)  Resp 20  Ht 72\" (182.9 cm)  Wt 224 lb (102 kg)  SpO2 98%  BMI 30.38 kg/m2     Intake/Output Summary (Last 24 hours) at 10/02/17 1332  Last data filed at 10/02/17 1300   Gross per 24 hour   Intake              320 ml   Output              700 ml   Net             -380 ml       Echeverria cath out yesterday.  Bladder scan for >500 cc today.  Reinserted.  No haldol last 3 days but got a dose this am at 0221.      Chart reviewed. Medication PRN use noted. Pt seen- restless, picking, yelling today.  Denies pain.  Discussed with Hospice Team.  Seroquel dose adjusted.  Continue Hospice Routine Home Care.        Sara Hong MD  10/02/17  1:32 PM    "

## 2017-10-02 NOTE — PROGRESS NOTES
Continued Stay Note  [facility]     Patient Name: Franky Pacheco  MRN: [id number]  Today's Date: 10/2/2017    Admit Date: 9/8/2017          Discharge Plan       10/02/17 1729    Case Management/Social Work Plan    Additional Comments Continuing to await state guardian to complete admission paperwork for [facility]-unable to reach her by phone today.  Rescheduled ambulance for 10/4/17 @ 2:00pm.  Tanna Lind, Ext. [phone]              Discharge Codes     None            CHEL Fang

## 2017-10-02 NOTE — PLAN OF CARE
Problem: Patient Care Overview (Adult)  Goal: Plan of Care Review  Outcome: Ongoing (interventions implemented as appropriate)    10/01/17 1755 10/01/17 2000 10/02/17 0304   Coping/Psychosocial Response Interventions   Plan Of Care Reviewed With --  patient --    Patient Care Overview   Progress no change --  --    Outcome Evaluation   Outcome Summary/Follow up Plan --  --  during the night bladder scan with over 500, was able to insert chandler cath, patient more comfortable and resting well.

## 2017-10-03 NOTE — PROGRESS NOTES
Continued Stay Note  [facility]     Patient Name: Franky Pacheco  MRN: [id number]  Today's Date: 10/3/2017    Admit Date: 9/8/2017          Discharge Plan       10/03/17 1611    Case Management/Social Work Plan    Plan [facility] with hospice    Additional Comments Chart reviewed.  Pt lying awake in bed.  Everardo and Rose at bedside feeding pt lunch.  Plan is for pt to transfer to [facility] tomorrow (10/4) at 1400 via ambulance with continued hospice care.  If hospice team can be of further assist, call ext [phone].       10/03/17 1335    Case Management/Social Work Plan    Additional Comments Updated Admissions Coordinator @ [facility] earlier today and provided her with state guardian contact information.  Awaiting guardianship/nursing facility approval for transfer.              Discharge Codes     None            Cuca Bautista RN

## 2017-10-03 NOTE — PROGRESS NOTES
"Continued Stay Note  [facility]     Patient Name: Franky Pacheco  MRN: [id number]  Today's Date: 10/3/2017    Admit Date: 9/8/2017          Discharge Plan       10/03/17 0942    Case Management/Social Work Plan    Additional Comments Received call from Sade Lemons/[facility] Guardianship this am; reports she was out of office yesterday.  Guardianship reports this am some confusion with Mr. Pacheco's paperwork-she was unable to access his information in her computer.  Guardianship to \"look into\" and call me back once she has paperwork.  Informed guardian we have obtained nursing facility bed and transfer scheduled for 10/4/17-will need guardianship to complete admission paperwork or give HC Surrogate permission to do so.   Tanna Lind, Ext. [phone]              Discharge Codes     None            CHEL Fang    "

## 2017-10-03 NOTE — PLAN OF CARE
Problem: Patient Care Overview (Adult)  Goal: Plan of Care Review  Outcome: Ongoing (interventions implemented as appropriate)    10/01/17 1755 10/02/17 1609 10/02/17 1800   Coping/Psychosocial Response Interventions   Plan Of Care Reviewed With --  --  patient   Patient Care Overview   Progress no change --  --    Outcome Evaluation   Outcome Summary/Follow up Plan --  Pt was combative this PM for small amount of time. He was reoriented and   is now resting with no s/s of distress. F/C draining. Will continue to monitor.  --        Goal: Adult Individualization and Mutuality  Outcome: Ongoing (interventions implemented as appropriate)  Goal: Discharge Needs Assessment  Outcome: Ongoing (interventions implemented as appropriate)    Problem: Confusion, Acute (Adult)  Goal: Cognitive/Functional Impairments Minimized  Outcome: Ongoing (interventions implemented as appropriate)  Goal: Safety  Outcome: Ongoing (interventions implemented as appropriate)    Problem: Fall Risk (Adult)  Goal: Absence of Falls  Outcome: Ongoing (interventions implemented as appropriate)    Problem: Skin Integrity Impairment, Risk/Actual (Adult)  Goal: Skin Integrity/Wound Healing  Outcome: Ongoing (interventions implemented as appropriate)    Problem: Dysphagia (Adult)  Goal: Functional/Safe Swallow  Outcome: Ongoing (interventions implemented as appropriate)  Goal: Compensatory Techniques to Improve Safety/Function with Swallowing  Outcome: Ongoing (interventions implemented as appropriate)    Problem: Sensory/Perceptual Alteration (Adult)  Goal: Functional Ability/Safety  Outcome: Ongoing (interventions implemented as appropriate)  Goal: Balanced Response to Sensory Input  Outcome: Ongoing (interventions implemented as appropriate)    Problem: Urine Elimination, Impaired (Adult)  Goal: Effective Urinary Elimination  Outcome: Ongoing (interventions implemented as appropriate)  Goal: Effective Containment of Urine  Outcome: Ongoing  (interventions implemented as appropriate)  Goal: Reduced Incontinence Episodes  Outcome: Ongoing (interventions implemented as appropriate)    Problem: Older Inpatient, Acutely Ill (Adult)  Goal: Signs and Symptoms of Listed Potential Problems Will be Absent or Manageable (Older Inpatient, Acutely Ill)  Outcome: Ongoing (interventions implemented as appropriate)

## 2017-10-03 NOTE — PROGRESS NOTES
"Hospice Progress Note    Patient Name: Franky Pacheco   : 1935  Sex: male    Code Status: comfort measures    Date of Admission: 2017    Subjective:    Pt awake this morning -- says Hi. Denies any pain, discomfort.     Per nursing: Pt lying awake in bed. Everardo and Rose at bedside feeding pt lunch.     BM on 10/1    ROS:  Review of Systems    Reviewed current scheduled and prn medications for route, type, dose and frequency.     •  albuterol  •  bisacodyl  •  glycopyrrolate  •  haloperidol lactate  •  haloperidol lactate  •  hydrALAZINE  •  ipratropium-albuterol  •  lactulose  •  ondansetron  •  QUEtiapine  •  senna    Objective:   /52  Pulse 67  Temp 98 °F (36.7 °C) (Oral)   Resp 20  Ht 72\" (182.9 cm)  Wt 224 lb (102 kg)  SpO2 98%  BMI 30.38 kg/m2   Intake & Output (last day)       10/02 07 - 10/03 0700 10/03 07 - 10/04 0700    P.O. 450 400    Irrigation  500    Total Intake(mL/kg) 450 (4.4) 900 (8.9)    Urine (mL/kg/hr) 1475 (0.6)     Total Output 1475      Net -1025 +900              Lab Results (last 24 hours)     ** No results found for the last 24 hours. **        Imaging Results (last 24 hours)     ** No results found for the last 24 hours. **          PPS: Palliative Performance Scale score as of 10/3/2017, 4:42 PM is 30% based on the following measures:   Ambulation: Totally bed bound  Activity and Evidence of Disease: Unable to do any work, extensive evidence of disease  Self-Care: Total care  Intake: Reduced   LOC: Full, drowsy or confusion      Physical Exam:  Physical Exam      Principal Problem:    Heart disease  Active Problems:    Complete heart block    Bilateral LE cellulitis    Chronic alcohol use    Altered mental status (Probable metabolic encephalopathy)    ? Cerebral vascular disease (Prior carotid stent)    Elevated troponin (R/O Type 2 NSTEMI)    CAD (Prior stent)    Alcohol withdrawal    Thrombosis of right cephalic vein    Assessment/Plan:     [facility]     Anticpate " transfer to [facility] tomorrow --- awaiting return call from guardian, which is why pt could not go today.     ZOILA Du  (C) [phone]  (O) [phone]  10/03/17  4:14 PM

## 2017-10-03 NOTE — PROGRESS NOTES
Continued Stay Note  [facility]     Patient Name: Franky Pacheco  MRN: [id number]  Today's Date: 10/3/2017    Admit Date: 9/8/2017          Discharge Plan       10/03/17 1335    Case Management/Social Work Plan    Additional Comments Updated Admissions Coordinator @ [facility] earlier today and provided her with state guardian contact information.  Awaiting guardianship/nursing facility approval for transfer.  Tanna Lind, ext. [phone]              Discharge Codes     None            CHEL Fang

## 2017-10-04 NOTE — PLAN OF CARE
Problem: Patient Care Overview (Adult)  Goal: Plan of Care Review  Outcome: Ongoing (interventions implemented as appropriate)    10/01/17 1755 10/03/17 2000 10/04/17 0434   Coping/Psychosocial Response Interventions   Plan Of Care Reviewed With --  patient --    Patient Care Overview   Progress no change --  --    Outcome Evaluation   Outcome Summary/Follow up Plan --  --  FC draining. Now resting. Will continue to monitor.        Goal: Adult Individualization and Mutuality  Outcome: Ongoing (interventions implemented as appropriate)  Goal: Discharge Needs Assessment  Outcome: Ongoing (interventions implemented as appropriate)    Problem: Confusion, Acute (Adult)  Goal: Cognitive/Functional Impairments Minimized  Outcome: Ongoing (interventions implemented as appropriate)  Goal: Safety  Outcome: Ongoing (interventions implemented as appropriate)    Problem: Fall Risk (Adult)  Goal: Absence of Falls  Outcome: Ongoing (interventions implemented as appropriate)    Problem: Skin Integrity Impairment, Risk/Actual (Adult)  Goal: Skin Integrity/Wound Healing  Outcome: Ongoing (interventions implemented as appropriate)    Problem: Dysphagia (Adult)  Goal: Functional/Safe Swallow  Outcome: Ongoing (interventions implemented as appropriate)  Goal: Compensatory Techniques to Improve Safety/Function with Swallowing  Outcome: Ongoing (interventions implemented as appropriate)    Problem: Sensory/Perceptual Alteration (Adult)  Goal: Functional Ability/Safety  Outcome: Ongoing (interventions implemented as appropriate)  Goal: Balanced Response to Sensory Input  Outcome: Ongoing (interventions implemented as appropriate)    Problem: Urine Elimination, Impaired (Adult)  Goal: Effective Urinary Elimination  Outcome: Ongoing (interventions implemented as appropriate)  Goal: Effective Containment of Urine  Outcome: Ongoing (interventions implemented as appropriate)  Goal: Reduced Incontinence Episodes  Outcome: Ongoing (interventions  implemented as appropriate)    Problem: Older Inpatient, Acutely Ill (Adult)  Goal: Signs and Symptoms of Listed Potential Problems Will be Absent or Manageable (Older Inpatient, Acutely Ill)  Outcome: Ongoing (interventions implemented as appropriate)

## 2017-10-04 NOTE — PROGRESS NOTES
Adult Nutrition  Assessment/PES    Patient Name:  Franky Pacheco  YOB: 1935  MRN: [id number]  Admit Date:  9/8/2017    Assessment Date:  10/4/2017              Reason for Assessment       10/04/17 1013    Reason for Assessment    Reason For Assessment/Visit follow up protocol    Time Spent (min) 20    Diagnosis --   per notes this admission                  Labs/Tests/Procedures/Meds       10/04/17 1013    Labs/Tests/Procedures/Meds    Labs/Tests Review Reviewed                Nutrition Prescription Ordered       10/04/17 1014    Nutrition Prescription PO    Current PO Diet Pureed   May have straws, comfort diet.            Evaluation of Received Nutrient/Fluid Intake       10/04/17 1014    PO Evaluation    Number of Meals 10    % PO Intake 40            Problem/Interventions:        Problem 1       10/04/17 1014    Nutrition Diagnoses Problem 1    Problem 1 Nutrition Appropriate for Condition at this Time    Inadequate Intake Type Oral    Etiology (related to) Goals of Care    Signs/Symptoms (evidenced by) --   comfort measures.                    Intervention Goal       10/04/17 1016    Intervention Goal    General Hospice Care            Nutrition Intervention       10/04/17 1016    Nutrition Intervention    RD/Tech Action Follow Tx progress;Care plan reviewd              Education/Evaluation       10/04/17 1016    Monitor/Evaluation    Monitor Per protocol        Electronically signed by:  Elda Cali RD  10/04/17 10:16 AM

## 2017-10-04 NOTE — PROGRESS NOTES
"Hospice Progress Note    Patient Name: Franky Pacheco   : 1935  Sex: male    Code Status: comfort measures    Date of Admission: 2017    Subjective:    Pt resting in bed    No visitors at bedside    One PRN needed overnight; two previous night    Taking oral medication    No medication held or declined    BM 10/1    ROS:  Review of Systems    Reviewed current scheduled and prn medications for route, type, dose and frequency.     •  albuterol  •  bisacodyl  •  glycopyrrolate  •  haloperidol lactate  •  haloperidol lactate  •  hydrALAZINE  •  ipratropium-albuterol  •  lactulose  •  ondansetron  •  QUEtiapine  •  senna    Objective:   /70  Pulse 80  Temp 98.4 °F (36.9 °C)  Resp 20  Ht 72\" (182.9 cm)  Wt 224 lb (102 kg)  SpO2 98%  BMI 30.38 kg/m2   Intake & Output (last day)       10/03 07 - 10/04 0700 10/04 07 - 10/05 0700    P.O. 500 260    Irrigation 1100     Total Intake(mL/kg) 1600 (15.7) 260 (2.6)    Urine (mL/kg/hr) 525 (0.2) 350 (0.5)    Total Output 525 350    Net +1075 -90          Unmeasured Stool Occurrence 0 x         Lab Results (last 24 hours)     ** No results found for the last 24 hours. **        Imaging Results (last 24 hours)     ** No results found for the last 24 hours. **          PPS: Palliative Performance Scale score as of 10/4/2017, 1:49 PM is 30% based on the following measures:   Ambulation: Totally bed bound  Activity and Evidence of Disease: Unable to do any work, extensive evidence of disease  Self-Care: Total care  Intake: Reduced   LOC: Full, drowsy or confusion      Physical Exam:  Physical Exam      Principal Problem:    Heart disease  Active Problems:    Complete heart block    Bilateral LE cellulitis    Chronic alcohol use    Altered mental status (Probable metabolic encephalopathy)    ? Cerebral vascular disease (Prior carotid stent)    Elevated troponin (R/O Type 2 NSTEMI)    CAD (Prior stent)    Alcohol withdrawal    Thrombosis of right cephalic " vein      Assessment/Plan:     [facility]    Increase bowel regimen -- ordered prns, spoke to nursing     Anticpate transfer to [facility] now tomorrow (was initially today)      ZOILA Du  (C) [phone]  (O) [phone]  10/04/17  1:48 PM

## 2017-10-04 NOTE — PROGRESS NOTES
Continued Stay Note  [facility]     Patient Name: Franky Pacheco  MRN: [id number]  Today's Date: 10/4/2017    Admit Date: 9/8/2017          Discharge Plan       10/04/17 1812    Case Management/Social Work Plan    Plan [facility] with hospice    Additional Comments Chart reviewed.  Pt lying awake in bed.  No visitors present.  Transfer date has been moved to Thursday, October 5th at 1400.  Home team is aware that pt is coming.  Tanna Lind has made guardian aware that there is paperwork to be signed for hospice.  If hospice team can be of further assist, call ext [phone].               Discharge Codes     None            Cuca Bautista RN

## 2017-10-04 NOTE — PROGRESS NOTES
Continued Stay Note  [facility]     Patient Name: Franky Pacheco  MRN: [id number]  Today's Date: 10/4/2017    Admit Date: 9/8/2017          Discharge Plan       10/04/17 0927    Case Management/Social Work Plan    Additional Comments Spoke with Guardianship this am--Mr. Pacheco now has assigned guardian, Tho Jon @ [phone].  Mr. Jon will complete all paperwork necessary for nursing facility and Hospice.  Have left message for him to contact me as soon as possible in order to facilitate process.  Due to discovering new guardian today, rescheduled transfer to nursing facility for tomorrow, 10/5/17, @ 2:00pm.  Discussed with Hospice and APS this morning.  Tanna Lind, Ext. [phone]              Discharge Codes     None            CHEL Fang

## 2017-10-05 NOTE — PROGRESS NOTES
Continued Stay Note  [facility]     Patient Name: Franky Pacheco  MRN: [id number]  Today's Date: 10/5/2017    Admit Date: 9/8/2017          Discharge Plan       10/05/17 1241    Case Management/Social Work Plan    Additional Comments Received call from Guardianship this morning--patient's guardian has changed and new guardian is Martin Ferrara (per Guardianship, Martin will be permanent guardian moving forward).  Contact numbers for guardian:  office# [phone], cell# [phone].  Cancelled transfer to [facility] today, will await guardian completing paperwork/facility approval prior to transfer.  Updated Hospice RN.  Tanna Lind, Ext. [phone]              Discharge Codes     None            CHEL Fang

## 2017-10-05 NOTE — PROGRESS NOTES
"Hospice Progress Note    Patient Name: Franky Pacheco   : 1935  Sex: male    Code Status: comfort measures    Date of Admission: 2017    Subjective:    Pt congested overnight -- requiring prns and call to oncall    Pt resting in bed -- up earlier with Everardo at bedside -- ate some    Discharge to LTC facility has been delayed for yet another day    Coreg held 2/2 parameters    Required 2 prns of dilaudid, one haldol, and two robinul    ROS:  Review of Systems   Unable to perform ROS: Other   Constitutional:        Pt is comfortably resting with eyes closed - undisturbed by exam.        Reviewed current scheduled and prn medications for route, type, dose and frequency.     •  albuterol  •  bisacodyl  •  glycopyrrolate  •  haloperidol lactate  •  haloperidol lactate  •  hydrALAZINE  •  HYDROmorphone  •  ipratropium-albuterol  •  lactulose  •  lactulose  •  ondansetron  •  QUEtiapine  •  senna    Objective:   BP (!) 89/47 (BP Location: Right leg, Patient Position: Lying) Comment: nurse notified  Pulse 70  Temp 97.4 °F (36.3 °C) (Axillary)   Resp 16  Ht 72\" (182.9 cm)  Wt 224 lb (102 kg)  SpO2 90%  BMI 30.38 kg/m2   Intake & Output (last day)       10/04 07 - 10/05 0700 10/05 07 - 10/06 0700    P.O. 320 180    Irrigation      Total Intake(mL/kg) 320 (3.1) 180 (1.8)    Urine (mL/kg/hr) 700 (0.3)     Total Output 700      Net -380 +180              Lab Results (last 24 hours)     ** No results found for the last 24 hours. **        Imaging Results (last 24 hours)     ** No results found for the last 24 hours. **          PPS: Palliative Performance Scale score as of 10/6/2017, 8:01 AM is 30% based on the following measures:   Ambulation: Totally bed bound  Activity and Evidence of Disease: Unable to do any work, extensive evidence of disease  Self-Care: Total care  Intake: Reduced   LOC: Full, drowsy or confusion      Physical Exam:  Physical Exam   Constitutional: No distress.   HENT:   Head: " Normocephalic and atraumatic.   Eyes:   Eyes closed -- resting   Neck: No tracheal deviation present.   Cardiovascular: Normal rate and regular rhythm.  Exam reveals no friction rub.    No murmur heard.  Pulmonary/Chest: Effort normal and breath sounds normal.   Pt is CTA this morning. Previously has been + secretions.    Abdominal: Soft. He exhibits no distension. There is no tenderness.   Musculoskeletal: He exhibits no edema.   Neurological:   Resting -- did not disturb; pt was undisturbed by exam   Skin: Skin is dry. He is not diaphoretic. There is pallor.   Psychiatric:   Unable to assess       Principal Problem:    Heart disease  Active Problems:    Complete heart block    Bilateral LE cellulitis    Chronic alcohol use    Altered mental status (Probable metabolic encephalopathy)    ? Cerebral vascular disease (Prior carotid stent)    Elevated troponin (R/O Type 2 NSTEMI)    CAD (Prior stent)    Alcohol withdrawal    Thrombosis of right cephalic vein      Assessment/Plan:     Continue current plan of care -- monitor for increased need    RHC    Plan is to discharge to [facility] tomorrow -- discharge with scripts (levsin; oral morphine solution vs dilaudid)    Total Visit Time: 20 minutes  Face to Face Time: 10 MINUTES    ZOILA Du  (PJ) [phone]  (O) [phone]  10/05/17  4:17 PM

## 2017-10-05 NOTE — PLAN OF CARE
Problem: Patient Care Overview (Adult)  Goal: Plan of Care Review  Outcome: Ongoing (interventions implemented as appropriate)    10/01/17 1755 10/04/17 2152 10/05/17 0431   Coping/Psychosocial Response Interventions   Plan Of Care Reviewed With --  patient --    Patient Care Overview   Progress no change --  --    Outcome Evaluation   Outcome Summary/Follow up Plan --  --  FC putting out much less tonight for total of 100ml on shift. Resting better after having some Dilaudid. Venelex ordered for ulcer on coccyx. Robinul for gurgle and drying secretions. Anticipate DC today @ 1400.       Goal: Adult Individualization and Mutuality  Outcome: Ongoing (interventions implemented as appropriate)  Goal: Discharge Needs Assessment  Outcome: Ongoing (interventions implemented as appropriate)    Problem: Confusion, Acute (Adult)  Goal: Cognitive/Functional Impairments Minimized  Outcome: Ongoing (interventions implemented as appropriate)  Goal: Safety  Outcome: Ongoing (interventions implemented as appropriate)    Problem: Fall Risk (Adult)  Goal: Absence of Falls  Outcome: Ongoing (interventions implemented as appropriate)    Problem: Skin Integrity Impairment, Risk/Actual (Adult)  Goal: Skin Integrity/Wound Healing  Outcome: Ongoing (interventions implemented as appropriate)    Problem: Dysphagia (Adult)  Goal: Functional/Safe Swallow  Outcome: Ongoing (interventions implemented as appropriate)  Goal: Compensatory Techniques to Improve Safety/Function with Swallowing  Outcome: Ongoing (interventions implemented as appropriate)    Problem: Sensory/Perceptual Alteration (Adult)  Goal: Functional Ability/Safety  Outcome: Ongoing (interventions implemented as appropriate)  Goal: Balanced Response to Sensory Input  Outcome: Ongoing (interventions implemented as appropriate)    Problem: Urine Elimination, Impaired (Adult)  Goal: Effective Urinary Elimination  Outcome: Ongoing (interventions implemented as appropriate)  Goal:  Effective Containment of Urine  Outcome: Ongoing (interventions implemented as appropriate)  Goal: Reduced Incontinence Episodes  Outcome: Ongoing (interventions implemented as appropriate)    Problem: Older Inpatient, Acutely Ill (Adult)  Goal: Signs and Symptoms of Listed Potential Problems Will be Absent or Manageable (Older Inpatient, Acutely Ill)  Outcome: Ongoing (interventions implemented as appropriate)

## 2017-10-05 NOTE — PLAN OF CARE
Problem: Patient Care Overview (Adult)  Goal: Plan of Care Review  Outcome: Ongoing (interventions implemented as appropriate)    10/05/17 1723   Coping/Psychosocial Response Interventions   Plan Of Care Reviewed With patient   Patient Care Overview   Progress declining   Outcome Evaluation   Outcome Summary/Follow up Plan Patient is less alert, difficult to arouse, no longer taking PO. Bed at [facility] cancled today due to incomplete [employer] paperwork, is tenatively schedules to go tomorrow         Problem: Fall Risk (Adult)  Intervention: Monitor/Assist with Self Care    10/01/17 2000 10/04/17 2000 10/05/17 0800   Monitor/Assist with Self Care   Ambulation --  --  4-->completely dependent   Transferring --  --  4-->completely dependent   Toileting --  --  4-->completely dependent   Bathing --  --  4-->completely dependent   Dressing --  --  4-->completely dependent   Eating --  --  2-->assistive person   Communication --  --  2-->difficulty speaking (not related to language barrier)   Swallowing (if score 2 or more for any item, consult Rehab Services) --  2-->difficulty swallowing liquids/foods --    Activity   Activity Type --  --  --    Activity Level of Assistance --  --  --    Musculoskeletal Interventions   Self-Care Promotion independence encouraged;BADL personal objects within reach --  --      10/05/17 1600   Monitor/Assist with Self Care   Ambulation --    Transferring --    Toileting --    Bathing --    Dressing --    Eating --    Communication --    Swallowing (if score 2 or more for any item, consult Rehab Services) --    Activity   Activity Type bedrest   Activity Level of Assistance assistance, 2 people   Musculoskeletal Interventions   Self-Care Promotion --        Intervention: Reduce Risk/Promote Restraint Free Environment    10/05/17 1600   Safety Interventions   Safety/Security Measures bed alarm set   Environmental Safety Modification assistive device/personal items within  reach;clutter free environment maintained;lighting adjusted;room near unit station;room organization consistent   Restraint Interventions   Safety Promotion/Fall Prevention safety round/check completed;fall prevention program maintained;muscle strengthening facilitated;nonskid shoes/slippers when out of bed       Intervention: Review Medications/Identify Contributors to Fall Risk    10/04/17 2000   Safety Interventions   Medication Review/Management medications reviewed         Goal: Absence of Falls  Outcome: Ongoing (interventions implemented as appropriate)    10/05/17 1723   Fall Risk (Adult)   Absence of Falls making progress toward outcome         Problem: Skin Integrity Impairment, Risk/Actual (Adult)  Intervention: Prevent/Manage Excess Moisture    10/05/17 1400 10/05/17 1600   Hygiene Care Assistance   Perineal Care cleansed;absorbent pad changed;skin sealant/barrier applied --    Hygiene Care   Bathing/Skin Care barrier wipes --    Skin Interventions   Skin Protection --  incontinence pads utilized       Intervention: Prevent/Minimize Sheer/Friction Injuries    10/05/17 1600   Positioning   Positioning/Transfer Devices pillows;in use   Skin Interventions   Pressure Reduction Techniques frequent weight shift encouraged;heels elevated off bed;positioned off wounds;weight shift assistance provided   Pressure Reduction Devices heel offloading device utilized;positioning supports utilized;pressure-redistributing mattress utilized;specialty bed utilized         Goal: Skin Integrity/Wound Healing  Outcome: Ongoing (interventions implemented as appropriate)    10/05/17 1723   Skin Integrity Impairment, Risk/Actual (Adult)   Skin Integrity/Wound Healing making progress toward outcome         Problem: Older Inpatient, Acutely Ill (Adult)  Intervention: Monitor/Assist with Self Care    10/01/17 2000 10/04/17 2000 10/05/17 0800   Monitor/Assist with Self Care   Ambulation --  --  4-->completely dependent    Transferring --  --  4-->completely dependent   Toileting --  --  4-->completely dependent   Bathing --  --  4-->completely dependent   Dressing --  --  4-->completely dependent   Eating --  --  2-->assistive person   Communication --  --  2-->difficulty speaking (not related to language barrier)   Swallowing (if score 2 or more for any item, consult Rehab Services) --  2-->difficulty swallowing liquids/foods --    Activity   Activity Type --  --  --    Activity Level of Assistance --  --  --    Musculoskeletal Interventions   Self-Care Promotion independence encouraged;BADL personal objects within reach --  --      10/05/17 1600   Monitor/Assist with Self Care   Ambulation --    Transferring --    Toileting --    Bathing --    Dressing --    Eating --    Communication --    Swallowing (if score 2 or more for any item, consult Rehab Services) --    Activity   Activity Type bedrest   Activity Level of Assistance assistance, 2 people   Musculoskeletal Interventions   Self-Care Promotion --        Intervention: Promote Functional Ability/Mobility    09/13/17 0023 10/01/17 1600 10/05/17 1600   Activity   Activity Type --  --  bedrest   Safety Interventions   Environmental Safety Modification --  --  assistive device/personal items within reach;clutter free environment maintained;lighting adjusted;room near unit station;room organization consistent   Restraint Interventions   Range of Motion Bilateral Upper and Lower Extremities --  --    Bilateral Upper and Lower Extremities Range of Motion --  AROM and PROM performed --        Intervention: Promote Psychosocial Well-being    10/05/17 1600   Coping Strategies   Supportive Measures active listening utilized;decision-making supported;positive reinforcement provided;problem solving facilitated   Family/Support System Care involvement promoted;presence promoted       Intervention: Promote/Optimize Nutrition    10/04/17 0800 10/05/17 1400   Hygiene Care Assistance   Oral  Care --  lip lubricant applied;oral swabbing   Nutrition Interventions   Oral Nutrition Promotion rest periods promoted --        Intervention: Prevent/Minimize Sheer/Friction Injuries    10/05/17 1600   Positioning   Positioning/Transfer Devices pillows;in use   Skin Interventions   Pressure Reduction Techniques frequent weight shift encouraged;heels elevated off bed;positioned off wounds;weight shift assistance provided   Pressure Reduction Devices heel offloading device utilized;positioning supports utilized;pressure-redistributing mattress utilized;specialty bed utilized       Intervention: Optimize Communication    10/04/17 0800   Cognitive Interventions   Communication Enhancement Strategies extra time allowed for response       Intervention: Promote Orientation/Familiarity/Consistency    10/04/17 0800 10/05/17 0800   Coping/Psychosocial Interventions   Environment Familiarity/Consistency --  daily routine followed   Cognitive Interventions   Sensory Stimulation Regulation care clustered --    Orientation Measures clock in view;glasses encouraged;hearing device encouraged;reorientation provided --          Goal: Signs and Symptoms of Listed Potential Problems Will be Absent or Manageable (Older Inpatient, Acutely Ill)  Outcome: Ongoing (interventions implemented as appropriate)    10/01/17 2750   Older Adult Inpatient, Acutely Ill   Problems Assessed (Acutely Ill Older Adult) all   Problems Present (Acutely Ill Older Adult) acute cognitive dysfunction;cognitive impairment;functional decline/self-care deficit;situational response;voiding dysfunction

## 2017-10-06 NOTE — PLAN OF CARE
Problem: Confusion, Acute (Adult)  Goal: Cognitive/Functional Impairments Minimized  Outcome: Ongoing (interventions implemented as appropriate)    10/01/17 9426   Confusion, Acute (Adult)   Cognitive/Functional Impairments Minimized making progress toward outcome   Hospice.

## 2017-10-06 NOTE — PROGRESS NOTES
Continued Stay Note   [facility]     Patient Name: Franky Pacheco  MRN: [id number]  Today's Date: 10/6/2017    Admit Date: 9/8/2017          Discharge Plan       10/06/17 0925    Case Management/Social Work Plan    Additional Comments Continuing to await approval by Guardian/[facility] for transfer--Guardianship continues to work on paperwork, accessing patient financial information, etc.  Will f/u on Monday for status and schedule transfer when appropriate.  Updated HC Surrogate and Hospice RN.  Tanna Lind, ext. [phone]              Discharge Codes     None            CHEL Fang

## 2017-10-06 NOTE — PROGRESS NOTES
Continued Stay Note  [facility]     Patient Name: Franky Pacheco  MRN: [id number]  Today's Date: 10/6/2017    Admit Date: 9/8/2017          Discharge Plan       10/06/17 0930    Case Management/Social Work Plan    Plan inpatient hospice note    Additional Comments Chart reviewed. Patient is unresponsive.  Breathing is shallow.  Skin pale and knees cool to touch.  Nailbeds dusky.  Pupils a 4 and fixed.  Patient responds to oral care only by closing mouth.  This is a significant change than previous days.  Transfer to [facility] cancelled for today due to guardianship issues and will re-evaluate again on Monday regarding transfer.            Case Management/Social Work Plan    Plan     Additional Comments           Case Management/Social Work Plan    Additional Comments               Discharge Codes     None            Ronna Winslow RN

## 2017-10-06 NOTE — PROGRESS NOTES
Continued Stay Note  [facility]     Patient Name: Franky Pacheco  MRN: [id number]  Today's Date: 10/5/2017    Admit Date: 9/8/2017          Discharge Plan       10/05/17 1535    Case Management/Social Work Plan    Plan inpatient hospice note    Additional Comments Chart reviewed. Patient to transfer to [facility] but transfer deferred until tomorrow at 1400.  Patient lethargic, skin pale and cool to touch. Does not arouse to stimuli.  Patient appears comfortable. Face and body are relaxed.  Breathing is shallow but unlabored.  Audible upper airway secretions noted.  Family not at bedside at this time.  Please call hospice RN at [phone] for any assistance.              Discharge Codes     None            Ronna Winslow RN

## 2017-10-06 NOTE — PROGRESS NOTES
"Hospice Progress Note    Patient Name: Franky Pacheco   : 1935  Sex: male    Code Status: comfort measures    Date of Admission: 2017    Subjective:    Patient is a 81 y.o. male presents with worsening confusion, HTN emergency, bradycardia, A/C kidney disease, BLE edema, anemia, and complete heart block. Despite aggressive medical treatment, pt continued to decline and comfort measures were sought. Pt was transitioned to inpt Hospice on 17 for mgmt of acute symptoms.      Plans to d/c to LTC this week; delayed 2/2 coordination with pt's state guardian    Pt with noticeable change yesterday afternoon and overnight    Held: coreg x 3 administrations 2/2 parameters not met; lactulose this morning    Not able to take oral medications this morning (seroquel crushed - pt still did not swallowing)    Prns: robinul once overnight today and yesterday    Not responding to care (pt can be resistant and will verbally remark during care) Tachypneic; pt is more unresponsive than responsive; pupils fixed, 4mm in size, equal bilaterally    BM: one today and one yesterday    Minimal urine output yesterday and none today -- bladder scan showed bladder empty    ROS:  Review of Systems   Unable to perform ROS: Patient unresponsive     Reviewed current scheduled and prn medications for route, type, dose and frequency.     •  albuterol  •  bisacodyl  •  glycopyrrolate  •  haloperidol lactate  •  haloperidol lactate  •  hydrALAZINE  •  HYDROmorphone  •  ipratropium-albuterol  •  lactulose  •  lactulose  •  ondansetron  •  QUEtiapine  •  senna    Objective:   BP 90/55  Pulse 70  Temp 97.1 °F (36.2 °C) (Oral)   Resp 22  Ht 72\" (182.9 cm)  Wt 224 lb (102 kg)  SpO2 92%  BMI 30.38 kg/m2   Intake & Output (last day)       10/05 0701 - 10/06 0700 10/06 0701 - 10/07 07    P.O. 180 0    Total Intake(mL/kg) 180 (1.8) 0 (0)    Urine (mL/kg/hr) 105 (0)     Stool 0 (0)     Total Output 105      Net +75 0          Unmeasured " Stool Occurrence 1 x 1 x        Lab Results (last 24 hours)     ** No results found for the last 24 hours. **        Imaging Results (last 24 hours)     ** No results found for the last 24 hours. **          PPS: Palliative Performance Scale score as of 10/6/2017, 1:19 PM is 10% based on the following measures:   Ambulation: Totally bed bound  Activity and Evidence of Disease: Unable to do any work, extensive evidence of disease  Self-Care: Total care  Intake:  Mouth care only  LOC: Drowsy or coma    Physical Exam:  Physical Exam   Constitutional: No distress.   HENT:   Head: Normocephalic and atraumatic.   Eyes:   Filxed at 4mm; nonreactive   Neck: No tracheal deviation present.   Cardiovascular: Normal rate and regular rhythm.    Radial pulses not palpable   Pulmonary/Chest: Effort normal.   A: more expir congestion noted today; NAD   Abdominal: Soft. He exhibits no distension. There is no tenderness.   Musculoskeletal: He exhibits no edema.   Neurological:   Does not follow commands   Skin: Skin is dry. He is not diaphoretic. There is pallor.   Color change noted -- seems more pale   Psychiatric:   Unable to assess       Principal Problem:    Heart disease  Active Problems:    Complete heart block    Bilateral LE cellulitis    Chronic alcohol use    Altered mental status (Probable metabolic encephalopathy)    ? Cerebral vascular disease (Prior carotid stent)    Elevated troponin (R/O Type 2 NSTEMI)    CAD (Prior stent)    Alcohol withdrawal    Thrombosis of right cephalic vein      Assessment/Plan:      81 y.o. male patient admitted to Hospice for acute onset of confusion, in hypertensive emergency. He was treated for cellulitis, renal insufficiency, complete heart block; originally admitted to the ICU post pacemaker placement (non ICD). He has continued to have reduced activity, altered mental status, dysphagia, and diminished po intake.      *Worsened hypernatremia and KRISTA on 9/21 labs so lasix  discontinued.    *Significant change noted yesterday -- pt has continued to decline     - change status to GIP  - prn Dilaudid changed to q1H prn  - inform POA (exwife)/daughter/guardian that pt appears to be actively dying  - d/c scheduled oral medications -- generous IV prn medications available  - schedule dilaudid 0.25mg TID  - schedule zofran q8H    Total Visit Time: 50 minutes  Face to Face Time: 20 minutes    Justification for care:  Patient meets criteria for acute in-patient care with required nursing assessment and interventions for symptoms with IV medications.    Marycruz Bowers, ZOILA  (C) [phone]  (O) [phone]  10/06/17  12:55 PM

## 2017-10-06 NOTE — PLAN OF CARE
Problem: Patient Care Overview (Adult)  Goal: Plan of Care Review  Outcome: Ongoing (interventions implemented as appropriate)    10/05/17 1723 10/05/17 2000 10/06/17 0518   Coping/Psychosocial Response Interventions   Plan Of Care Reviewed With --  patient --    Patient Care Overview   Progress declining --  --    Outcome Evaluation   Outcome Summary/Follow up Plan --  --  Patient less alert and more confused. Possible dc to [facility] today. Not taking PO medication.        Goal: Adult Individualization and Mutuality  Outcome: Ongoing (interventions implemented as appropriate)  Goal: Discharge Needs Assessment  Outcome: Ongoing (interventions implemented as appropriate)    Problem: Confusion, Acute (Adult)  Goal: Cognitive/Functional Impairments Minimized  Outcome: Ongoing (interventions implemented as appropriate)  Goal: Safety  Outcome: Ongoing (interventions implemented as appropriate)    Problem: Fall Risk (Adult)  Goal: Absence of Falls  Outcome: Ongoing (interventions implemented as appropriate)    Problem: Skin Integrity Impairment, Risk/Actual (Adult)  Goal: Skin Integrity/Wound Healing  Outcome: Ongoing (interventions implemented as appropriate)    Problem: Dysphagia (Adult)  Goal: Functional/Safe Swallow  Outcome: Ongoing (interventions implemented as appropriate)  Goal: Compensatory Techniques to Improve Safety/Function with Swallowing  Outcome: Ongoing (interventions implemented as appropriate)    Problem: Sensory/Perceptual Alteration (Adult)  Goal: Functional Ability/Safety  Outcome: Ongoing (interventions implemented as appropriate)  Goal: Balanced Response to Sensory Input  Outcome: Ongoing (interventions implemented as appropriate)    Problem: Urine Elimination, Impaired (Adult)  Goal: Effective Urinary Elimination  Outcome: Ongoing (interventions implemented as appropriate)  Goal: Effective Containment of Urine  Outcome: Ongoing (interventions implemented as appropriate)  Goal:  Reduced Incontinence Episodes  Outcome: Ongoing (interventions implemented as appropriate)    Problem: Older Inpatient, Acutely Ill (Adult)  Goal: Signs and Symptoms of Listed Potential Problems Will be Absent or Manageable (Older Inpatient, Acutely Ill)  Outcome: Ongoing (interventions implemented as appropriate)

## 2017-10-06 NOTE — PROGRESS NOTES
Continued Stay Note  [facility]     Patient Name: Franky Pacheco  MRN: [id number]  Today's Date: 10/6/2017    Admit Date: 9/8/2017          Discharge Plan       10/06/17 1111    Case Management/Social Work Plan    Plan inpatient hospice note      10/06/17 0925    Case Management/Social Work Plan    Additional Comments Continuing to await approval by Guardian/[facility] for transfer--Guardianship continues to work on paperwork, accessing patient financial information, etc.  Will f/u on Monday for status and rescedule transfer when appropriate.  Updated HC Surrogate and Hospice RN.              Discharge Codes     None            Ronna Winslow RN

## 2017-10-07 NOTE — SIGNIFICANT NOTE
Chrissy Kim with [employer] Oncall Guardianship notified of pt death. Ms. Kim took report and stated it was okay to call nearest relative (Ju Mckeon pt's daugther) to get permission to release the body to the  home.

## 2017-10-07 NOTE — SIGNIFICANT NOTE
Exam Confirms by auscultation no audible heart tones, no breath sounds. Death pronounced on 10/6/2017 at 19:12.     Silviano Ahmadi RN/[facility]     Witness : Jasmin Calloway RN

## 2021-07-31 NOTE — PLAN OF CARE
Problem: Sensory/Perceptual Alteration (Adult)  Goal: Functional Ability/Safety  Outcome: Ongoing (interventions implemented as appropriate)    10/01/17 0818   Sensory/Perceptual Alteration (Adult)   Functional Ability/Safety making progress toward outcome            Opt out

## 2023-01-29 NOTE — PROGRESS NOTES
"   LOS: 28 days    Patient Care Team:  No Known Provider as PCP - General  No Known Provider as PCP - Family Medicine    Chief Complaint: Acute kidney injury on chronic kidney disease    Subjective   Still very confused.  Pulled out IV this AM.    Review of Systems:   Pleasantly confused and Denies any nausea vomiting diarrhea constipation chest pain or shortness of breath.    Vital Sign Min/Max for last 24 hours  Temp  Min: 95.1 °F (35.1 °C)  Max: 95.1 °F (35.1 °C)   BP  Min: 93/59  Max: 150/82   Pulse  Min: 60  Max: 60   Resp  Min: 18  Max: 20   SpO2  Min: 92 %  Max: 100 %   Flow (L/min)  Min: 2  Max: 2   Weight  Min: 216 lb (98 kg)  Max: 216 lb (98 kg)     Flowsheet Rows         First Filed Value    Admission Height  72\" (182.9 cm) Documented at 08/05/2017 1559    Admission Weight  215 lb (97.5 kg) Documented at 08/05/2017 1559             I/O last 3 completed shifts:  In: 1551 [Other:577; NG/GT:974]  Out: 1450 [Urine:1450]    Physical Exam:  General appearance:Patient is awake alert in place and person.  Following commands still mild confusion  HEENT: Eyes pupils are reactive and equal, neck supple, no JVD.  Lungs: Clear to auscultation, no rhonchi's, Rales, equal chest movement, nonlabored.  Heart: No gallop murmur or rub.  Abdomen: Soft nontender megaly positive bowel sounds.  Extremities: Mild dependent lower extremity edema.   CNS: Patient is alert awake following commands and moving all extremities no focal deficit  Psych mild anxiety is noted.  Skin warm dry Positive redness noted right forearm.    WBC WBC   Date Value Ref Range Status   09/02/2017 4.31 3.50 - 10.80 10*3/mm3 Final   09/01/2017 4.64 3.50 - 10.80 10*3/mm3 Final      HGB Hemoglobin   Date Value Ref Range Status   09/02/2017 7.0 (L) 13.1 - 17.5 g/dL Final   09/02/2017 7.0 (L) 13.1 - 17.5 g/dL Final   09/01/2017 7.9 (L) 13.1 - 17.5 g/dL Final   09/01/2017 7.1 (L) 13.1 - 17.5 g/dL Final      HCT Hematocrit   Date Value Ref Range Status "   09/02/2017 22.7 (L) 38.9 - 50.9 % Final   09/02/2017 22.7 (L) 38.9 - 50.9 % Final   09/01/2017 25.7 (L) 38.9 - 50.9 % Final   09/01/2017 22.8 (L) 38.9 - 50.9 % Final      Platlets No results found for: LABPLAT   MCV MCV   Date Value Ref Range Status   09/02/2017 89.7 80.0 - 99.0 fL Final   09/01/2017 88.4 80.0 - 99.0 fL Final          Sodium Sodium   Date Value Ref Range Status   09/02/2017 142 132 - 146 mmol/L Final   09/01/2017 138 132 - 146 mmol/L Final   08/30/2017 136 132 - 146 mmol/L Final      Potassium Potassium   Date Value Ref Range Status   09/02/2017 5.4 3.5 - 5.5 mmol/L Final   09/01/2017 4.5 3.5 - 5.5 mmol/L Final   08/30/2017 4.5 3.5 - 5.5 mmol/L Final      Chloride Chloride   Date Value Ref Range Status   09/02/2017 111 (H) 99 - 109 mmol/L Final   09/01/2017 103 99 - 109 mmol/L Final   08/30/2017 105 99 - 109 mmol/L Final      CO2 CO2   Date Value Ref Range Status   09/02/2017 25.0 20.0 - 31.0 mmol/L Final   09/01/2017 25.0 20.0 - 31.0 mmol/L Final   08/30/2017 26.0 20.0 - 31.0 mmol/L Final      BUN BUN   Date Value Ref Range Status   09/02/2017 88 (H) 9 - 23 mg/dL Final   09/01/2017 94 (H) 9 - 23 mg/dL Final   08/30/2017 97 (H) 9 - 23 mg/dL Final      Creatinine Creatinine   Date Value Ref Range Status   09/02/2017 2.00 (H) 0.60 - 1.30 mg/dL Final   09/01/2017 2.10 (H) 0.60 - 1.30 mg/dL Final   08/30/2017 2.00 (H) 0.60 - 1.30 mg/dL Final      Calcium Calcium   Date Value Ref Range Status   09/02/2017 8.4 (L) 8.7 - 10.4 mg/dL Final   09/01/2017 8.5 (L) 8.7 - 10.4 mg/dL Final   08/30/2017 8.6 (L) 8.7 - 10.4 mg/dL Final      PO4 No results found for: CAPO4   Albumin Albumin   Date Value Ref Range Status   09/02/2017 2.90 (L) 3.20 - 4.80 g/dL Final   09/01/2017 3.10 (L) 3.20 - 4.80 g/dL Final   08/30/2017 3.30 3.20 - 4.80 g/dL Final      Magnesium Magnesium   Date Value Ref Range Status   08/30/2017 2.4 1.3 - 2.7 mg/dL Final      Uric Acid No results found for: URICACID        Results Review:     I  reviewed the patient's new clinical results.      atorvastatin 10 mg Oral Daily   carvedilol 25 mg Oral Q12H   castor oil-balsam peru  Topical Q12H   docusate sodium 150 mg Oral Daily   heparin (porcine) 5,000 Units Subcutaneous Q12H   hydrALAZINE 75 mg Oral Q8H   miconazole  Topical Q12H   miconazole  Topical Q12H   polyethylene glycol 17 g Oral Daily   terazosin 5 mg Oral Q12H       dextrose 5 % and sodium chloride 0.45 % 75 mL/hr Last Rate: 75 mL/hr (09/01/17 1918)       Medication Review: Noted above    Assessment/Plan     1- ACUTE KIDNEY INJURY IN CKD STAGE 4--Baseline Cr reportedly low-2's 2015.  Working Dx is [facility].  Good UOP.  Cr stable, albeit BUN still quite high.  Adjust meds for a CrCl 20-25ml/min.  Follow BMP and UOP daily.    2- ANEMIA NOS--Hgb far below target.  Check stool guaiac since BUN staying elevated, which could indicate GI bleed.  Consider transfusing 2units PRBC's today.    Jovanny Arellano MD  09/02/17  8:34 AM      1 person assist

## (undated) DEVICE — Device

## (undated) DEVICE — INTRO PEELAWAY SAFESHEATH II HEMO10F23CM

## (undated) DEVICE — LEX CATH LAB MINOR: Brand: MEDLINE INDUSTRIES, INC.

## (undated) DEVICE — ARM SLING II: Brand: DEROYAL

## (undated) DEVICE — AIRLIFE™ ADULT OXYGEN MASK VINYL, UNDER THE CHIN STYLE, 3 IN 1 MASK WITHOUT SAFETY VENT, WITH 7 FEET (2.1 M) CRUSH RESISTANT OXYGEN TUBING: Brand: AIRLIFE™

## (undated) DEVICE — DRSNG SURESITE123 4X4.8IN